# Patient Record
Sex: MALE | Race: WHITE | NOT HISPANIC OR LATINO | Employment: FULL TIME | URBAN - METROPOLITAN AREA
[De-identification: names, ages, dates, MRNs, and addresses within clinical notes are randomized per-mention and may not be internally consistent; named-entity substitution may affect disease eponyms.]

---

## 2017-01-09 ENCOUNTER — OFFICE VISIT (OUTPATIENT)
Dept: CARDIOLOGY | Facility: CLINIC | Age: 57
End: 2017-01-09
Payer: COMMERCIAL

## 2017-01-09 VITALS
OXYGEN SATURATION: 97 % | DIASTOLIC BLOOD PRESSURE: 83 MMHG | SYSTOLIC BLOOD PRESSURE: 150 MMHG | WEIGHT: 218.9 LBS | BODY MASS INDEX: 29.68 KG/M2 | HEART RATE: 60 BPM

## 2017-01-09 DIAGNOSIS — I25.10 CORONARY ARTERY DISEASE INVOLVING NATIVE CORONARY ARTERY OF NATIVE HEART WITHOUT ANGINA PECTORIS: Primary | ICD-10-CM

## 2017-01-09 PROCEDURE — 99213 OFFICE O/P EST LOW 20 MIN: CPT | Performed by: INTERNAL MEDICINE

## 2017-01-09 RX ORDER — ASPIRIN 325 MG
325 TABLET ORAL DAILY
Qty: 90 TABLET | Refills: 3
Start: 2017-01-09 | End: 2020-04-06

## 2017-01-09 ASSESSMENT — PAIN SCALES - GENERAL: PAINLEVEL: NO PAIN (0)

## 2017-01-09 NOTE — PROGRESS NOTES
"CARDIOLOGY CLINIC FOLLOW UP    HPI: Tremayne Galarza is a 56 year old male referred by [place], for ongoing evaluation of CAD.  The patient's risk factor profile is: (+) HTN, (-) diabetes, (+) hyperlipidemia, (-) tobacco use, (+) family Hx CAD [maternal Hx CAD at age 38].  He has no documented history of arrhythmia, or valvular heart disease.  The patient has a history of CAD dating back to Aug 2014 when he presented to [facility] with a \"tingling sensation in his lungs\" in the absence of chest discomfort.  He was actively working out at [employer] and noticed the abnormal breathing sensation during that strenuous activity.  He had a bicycle ECHO and his LVEF failed to augment (50% --> 45%) and he had anterior-apical hypokinesis at peak exercise.  On the ECG there was a maximum of 1.0-2.0 mm of upsloping ST depression in leads II, V4, V5, V6.   A maximum of 1.0-2.0 mm. of ST elevation was present in leads avR.  He had coronary angiography on 8/27/14 and was found to have two vessel CAD.  The report documents mild nonobstructive CAD in the LAD and LCx.  The RCA had subtotal stenosis in the proximal segment (stented with 3.5x24 mm JOSE) and diffuse narrowing in the mid-distal RCA up to 90% (stented with 2 overlapping JOSE, 3.5x12 and 2.75x20).  The patient had cardiac rehabilitation post coronary angiography and PCI.   I first evaluated him in March 2015 and reviewed his coronary angiogram from [facility].  There was a 60-70% stenosis in the LAD and I was concerned enough to bring him back to the cath lab and check an FFR.  The FFR was 0.78 and the stents in the RCA were widely patent.  He continued on medical therapy.  He has not had subsequent chest discomfort, SOB (including the tingling sensation he originally described), PND, orthopnea, pedal edema, palpitations, lightheadedness, and syncope.  He is active, working at [employer], 3 times a week.  He is not experiencing any " cardiopulmonary symptoms during exercise.  The patient has no history of cerebrovascular disease or PAD.    BPs in ambulatory setting well controlled.    The patient has not used SL NTG and has Rx for Viagra.  He knows not to use them within 24 hours of one another.    He remains on ASA and ticagrelor.  However, he has not had any bleeding symptoms.  He no longer needs to be on Brilinta.      PAST MEDICAL HISTORY:  Past Medical History   Diagnosis Date     Chronic ischemic heart disease 9/4/2014     Overview:  3 stents to RCA 8/2014      Hyperlipidemia 11/14/2005     Overview:  LW Onset:  13Tsd63      Essential hypertension 2/11/2005     Overview:  LW Onset:  83Uvs23        CURRENT MEDICATIONS:  Current Outpatient Prescriptions   Medication Sig Dispense Refill     ticagrelor (BRILINTA) 90 MG tablet Take 1 tablet (90 mg) by mouth 2 times daily 60 tablet 0     atorvastatin (LIPITOR) 80 MG tablet Take 1 tablet (80 mg) by mouth daily 30 tablet 6     atenolol (TENORMIN) 25 MG tablet Take 1 tablet (25 mg) by mouth daily 90 tablet 3     ASPIRIN PO Take 81 mg by mouth daily       Fexofenadine HCl (ALLEGRA PO) Take 30 mg by mouth daily       sildenafil (VIAGRA) 50 MG tablet as needed for ED       order for DME Equipment being ordered: Spacious - blue - size E 1 Device 0       PAST SURGICAL HISTORY:  Past Surgical History   Procedure Laterality Date     Cardiac surgery       Colonoscopy N/A 12/30/2015     Procedure: COLONOSCOPY;  Surgeon: Duane, William Charles, MD;  Location: [facility] OR     Colonoscopy with co2 insufflation N/A 12/30/2015     Procedure: COLONOSCOPY WITH CO2 INSUFFLATION;  Surgeon: Duane, William Charles, MD;  Location: [facility] OR       ALLERGIES  Review of patient's allergies indicates no known allergies.    FAMILY HX:  No family history on file.    SOCIAL HX:  Social History     Social History     Marital Status:      Spouse Name: N/A     Number of Children: N/A     Years of Education: N/A     Social History  Main Topics     Smoking status: Never Smoker      Smokeless tobacco: Never Used     Alcohol Use: Yes     Drug Use: No     Sexual Activity:     Partners: Female     Other Topics Concern     None     Social History Narrative       ROS:  Constitutional: No fever, chills, or sweats. No weight gain/loss.   HEENT: No visual disturbance, ear ache, epistaxis, sore throat.   Allergies/Immunologic: Negative.   Respiratory: No cough, hemoptysis.   Cardiovascular: As per HPI.   GI: No nausea, vomiting, hematemesis, melena, or hematochezia.   : No urinary frequency, dysuria, or hematuria.   Integument: No rash.   Psychiatric: No anxiety / depression.   Neuro: No speech disturbance, focal sensory or motor deficit.   Endocrinology: No polyuria / polyphagia.   Musculoskeletal: No myalgia.    VITAL SIGNS:  /83 mmHg  Pulse 60  Wt 99.292 kg (218 lb 14.4 oz)  SpO2 97%  Body mass index is 29.68 kg/(m^2).  Wt Readings from Last 2 Encounters:   01/09/17 99.292 kg (218 lb 14.4 oz)   12/22/15 97.523 kg (215 lb)       PHYSICAL EXAM  Tremayne Galarza is a 56 year old male.in no acute distress.  HEENT: Eyes Nonicteric.  Neck: JVP normal.  Carotids +3/3 bilaterally without bruits.  Lungs: CTA.  Cor: RRR. Normal S1 and S2.  No murmur, rub, or gallop.  PMI in Lf 5th ICS.  Abd: Soft, nontender, nondistended.  NABS.  No pulsatile mass.  Extremities: No C/C/E.  Pulses +3/3 symmetric in upper and lower extremities.  Neuro: Grossly intact.  Psych: A&O x 3.  Skin: No rash.    LABS  Recent Labs   Lab Test  03/26/15   0830  02/06/15   0735   WBC  4.9  5.2   HGB  14.4  15.4   HCT  41.8  43.0   PLT  140*  171     Recent Labs   Lab Test  03/26/15   0830  02/06/15   0735   NA  138  139   POTASSIUM  3.9  4.3   CHLORIDE  105  105   CO2  24  30   GLC  97  99   BUN  16  16   CR  0.73  0.81   ARMAND  9.1  9.0     Recent Labs   Lab Test  02/06/15   0735 09/23/14   CHOL  146  155  155  155   HDL  44  42*  42  42   LDL  80  85  85  85   TRIG  110  139   139  139   CHOLHDLRATIO  3.3  3.7  3.7  3.7      EK/5/15  SB at 49.  Normal intervals and axes.  No ST shift, TWI, or Q waves.  Isolated PVC.      STRESS ECHO: 14  CONCLUSIONS  REST:  LV chamber size, wall thickness, and segmental and global wall motion are normal. No significant valvular abnormalities are seen.  The visually estimated resting LVEF is 60 %.    STRESS:  There is lack of augmentation of LVEF during peak exercise.  Anterior-apical hypokinesis is suggested.    CONCLUSIONS:  Abnormal exercise echocardiogram with adequate heart rate and workload.  There is lack of augmentation of LVEF during peak exercise.  Anterior-apical hypokinesis is suggested.  Risk stratification by stress echocardiography is identified as intermediate to high risk.    REST ECG: Mild nonspecific ST-T segments.  Resting HR:62 bpmResting BP:122/76 mmHg  Pre-Stress Physical Exam  Current patient medications that may affect electrocardiographic  interpretation.: Atenolol, held for greater than 48 hours.    STRESS  Stress Type: Bike Ergometer  Peak HR: 146 bpm HR Response: Normal  Peak BP: 210/99 mmHg BP Response: Abnormal  Max Predicted HR: 168 bpm HR BP Product: 99187  % of Max Predicted HR: 87 Max Exercise: 9 METS  Test Duration: 14.3 min  Reason for Termination: Fatigue Exercise Effort: Good  Risk Stratification: Intermediate risk    RESULTS  Global LVEF (rest): Normal (LVEF >50%)  Global LVEF (stress): Mildly depressed (LVEF 40-50%)    ECG: A maximum of 1.0-2.0 mm of upsloping ST depression was present in leads II, V4, V5, V6.   A maximum of 1.0-2.0 mm. of ST elevation was present in leads avR.  Q waves were NOT present in leads with ST elevation.    SYMPTOMS  Chest pain was reproduced and did NOT require termination of the test. Pt complained of 3/10 chest burning with peak exercise.  Protocol completed. Predicted heart rate achieved. Hypertensive response to exercise. Shortness of breath.  Musculoskeletal.   Fatigue.    CARDIAC CATH: 8/27/14  The coronary circulation is right dominant.  Left main coronary artery   Left main: Normal.  Left anterior descending artery and branches  LAD: Normal. Moderate calcification, nonobstructive disease.  Left circumflex artery and branches  Circumflex: Moderate calcification, nonobstructive disease.  Right coronary artery and branches  RCA: This vessel is subtotally occluded in the proximal portion. There is a long segment of disease in the mid/distal portion with JOE 2 flow. Both of these lesions were successfully stented with an everolimus eluting  stents. Proximal RCA: Mid RCA:    Interventions  LESION #1 INTERVENTION: A successful drug-eluting stent was performed on the 99 % lesion in the proximal RCA. Following intervention there was an excellent angiographic appearance with a 0 % residual stenosis. There was JOE 3 flow after the  Procedure.   Balloon angioplasty was performed, using a 3.0 x 15 mm Emerge MR balloon, with 3 inflations and a maximum inflation pressure of 14 juan pablo.   A 3.5 x 24 Premier Promus Stent everolimus-eluting stent was placed across the lesion and deployed at a maximum inflation pressure of 11 juan pablo.    LESION #2 INTERVENTION A successful drug-eluting stent was performed on the 90 % lesion in the mid RCA. Following intervention there was an excellent angiographic appearance with a 0 % residual stenosis.  Balloon angioplasty was performed, using a 3.0 x 15 mm Emerge MR balloon, with 1 inflations and a maximum inflation pressure of 6 juan pablo. Balloon angioplasty was performed, using a 2.75 x 20 mm Emerge MR balloon, with 4 inflations and a maximum inflation pressure of 14 juan pablo. A 2.75 x 38 premier Promus Stent everolimus-eluting stent was placed across the lesion and deployed at a maximum inflation pressure of 18 juan pablo.  A 3.5 x 12 Premier Promus Stent everolimus-eluting stent was placed across the lesion and deployed at a maximum inflation pressure of 11  juan pablo.    Coronary Angiogram (3/26/15):  Coronary angiography was performed via the Rt RADIAL artery using a 5 Fr Shiraz catheter and a 6 Fr Ikari 3.5 GC.  The LAD and LCx had separate ostia.  Both coronary vessels had moderate calcification in the proximal segment.  The LAD had 30-40% narrowing proximally and a long 60% stenosis in the mid segment.  There was 25% narrowing at the takeoff of D3.  There were three diagonal branches, all moderate in caliber.  D1 had minimal disease.  D2 had 50% ostial narrowing.  D3 had minimal disease.  The LCx gave rise to 2 OM branches.  There was mild atherosclerosis (25%) disease in the LCx that was diffuse in nature.  The OM branches had mild disease. The RCA had a stent in the proximal segment and two overlapping stents spanning the mid-distal segment.  There was mild ISR (10%) in the stented segment.  There was 30-40% disease between the two stents.  There was minimal disease in the Rt PDA and Rt PL segments.    Physiologic assessment was performed on the LAD with the Radiwire positioned at the apex of the LAD.  The FFR of LAD was 0.78 at peak hyperemia.    Impression:    1. Single vessel CAD, angiographically borderline 60-70% long stenosis in mid LAD  2. Physiologically borderline stenosis in mid LAD    ASSESSMENT AND PLAN:    1. CAD.  Mr. Galarza has two vessel CAD and is now 29 months post PCI of the proximal, mid/distal RCA segments with JOSE.  I was concerned about the diffuse nature of the proximal / mid LAD His stress ECHO showed hypokinesis of anterior/apical segments.  He had FFR of 0.78 in the LAD.  I would like him to stay on the current Rx including ASA, Atenolol, and statin.  He may stop the Brilinta.  With normal LVEF and no prior acute coronary syndrome and HTN well controlled, I do not think the addition of an ACE-I is necessary at this time.     2. Hypercholesterolemia.    Recent Labs   Lab Test  01/10/17   0746  02/06/15   0735 09/23/14   CHOL  177  146  155   155  155   HDL  42  44  42*  42  42   LDL  94  80  85  85  85   TRIG  206*  110  139  139  139   CHOLHDLRATIO   --   3.3  3.7  3.7  3.7     LDL rising on max dose Lipitor.  Switch to Crestor 40 mg qd.    FOLLOW UP:  1 year      Tomás West MD    Divisions of Cardiology  [place], [place]    CC  Patient Care Team:  Mee Park APRN CNP as PCP - General (Family Practice)

## 2017-01-09 NOTE — PATIENT INSTRUCTIONS
The following is a summary of your office visit:    Medications started today:none    Medications stopped today: Brilinta    Medication dose change: Aspirin has been increased to 325 mg daily    Nurse contact information: Morena Perez RN  Cardiology Care Coordinator  [phone] Phone  [phone] Fax    Appointments made today: none. We will call you to schedule one year follow up    Patient instructions:none      If you have had any blood work, imaging or other testing completed we will be in touch within 1-2 weeks regarding the results. If you have any questions, concerns or need to schedule a follow up, please contact us at [phone]. If you are needing refills please contact your pharmacy. For urgent after hour care please call the [facility] at [phone] or the [facility] at [phone] and ask to speak to the cardiologist on call.    It was a pleasure meeting with you today. Please let us know if there is anything else we can do for you so that we can be sure you are leaving completely satisfied with your care experience.     Your Cardiology Team at [facility]  RN Care Coordinator: Morena  CMA: [place]

## 2017-01-09 NOTE — MR AVS SNAPSHOT
After Visit Summary   1/9/2017    Tremayne Galarza    MRN: [id number]           Patient Information     Date Of Birth          1960        Visit Information        Provider Department      1/9/2017 3:30 PM Tomás West MD [facility]        Today's Diagnoses     Coronary artery disease involving native coronary artery of native heart without angina pectoris    -  1       Care Instructions      The following is a summary of your office visit:    Medications started today:none    Medications stopped today: Brilinta    Medication dose change: Aspirin has been increased to 325 mg daily    Nurse contact information: Morena Perez RN  Cardiology Care Coordinator  [phone] Phone  [phone] Fax    Appointments made today: none. We will call you to schedule one year follow up    Patient instructions:none      If you have had any blood work, imaging or other testing completed we will be in touch within 1-2 weeks regarding the results. If you have any questions, concerns or need to schedule a follow up, please contact us at [phone]. If you are needing refills please contact your pharmacy. For urgent after hour care please call the [facility] at [phone] or the [facility] at [phone] and ask to speak to the cardiologist on call.    It was a pleasure meeting with you today. Please let us know if there is anything else we can do for you so that we can be sure you are leaving completely satisfied with your care experience.     Your Cardiology Team at [facility]  RN Care Coordinator: Morena Templeton                  Follow-ups after your visit        Your next 10 appointments already scheduled     Espinoza 10, 2017  7:30 AM   Return Visit with Radha Orona MD   [facility]    [address] [address] [place] [id number] [phone]            Espinoza 10, 2017  7:50 AM    LAB with LAB FIRST FLOOR [facility] ([facility])    [address] [address] [place] [id number] [phone]           Patient must bring picture ID.  Patient should be prepared to give a urine specimen  Please do not eat 10-12 hours before your appointment if you are coming in fasting for labs on lipids, cholesterol, or glucose (sugar).  Pregnant women should follow their Care Team instructions. Water with medications is okay. Do not drink coffee or other fluids.   If you have concerns about taking  your medications, please ask at office or if scheduling via [employer], send a message by clicking on Secure Messaging, Message Your Care Team.              Who to contact     If you have questions or need follow up information about today's clinic visit or your schedule please contact [facility] directly at [phone].  Normal or non-critical lab and imaging results will be communicated to you by [employer], letter or phone within 4 business days after the clinic has received the results. If you do not hear from us within 7 days, please contact the clinic through [facility] or phone. If you have a critical or abnormal lab result, we will notify you by phone as soon as possible.  Submit refill requests through [employer] or call your pharmacy and they will forward the refill request to us. Please allow 3 business days for your refill to be completed.          Additional Information About Your Visit        [employer] Information     [employer] gives you secure access to your electronic health record. If you see a primary care provider, you can also send messages to your care team and make appointments. If you have questions, please call your primary care clinic.  If you do not have a primary care provider, please call [phone] and they will assist you.      [employer] is an electronic gateway that provides easy, online access to your medical records. With [employer], you  can request a clinic appointment, read your test results, renew a prescription or communicate with your care team.     To access your existing account, please contact your [facility] or call [phone] for assistance.        Care EveryWhere ID     This is your Care EveryWhere ID. This could be used by other organizations to access your [facility] [place] medical records  [id number]        Your Vitals Were     Pulse Pulse Oximetry                60 97%           Blood Pressure from Last 3 Encounters:   01/09/17 150/83   08/04/16 127/81   01/15/16 130/78    Weight from Last 3 Encounters:   01/09/17 99.292 kg (218 lb 14.4 oz)   12/22/15 97.523 kg (215 lb)   10/29/15 97.705 kg (215 lb 6.4 oz)              Today, you had the following     No orders found for display         Today's Medication Changes          These changes are accurate as of: 1/9/17  4:22 PM.  If you have any questions, ask your nurse or doctor.               These medicines have changed or have updated prescriptions.        Dose/Directions    aspirin 325 MG tablet   This may have changed:    - medication strength  - how much to take   Used for:  Coronary artery disease involving native coronary artery of native heart without angina pectoris   Changed by:  Tomás West MD        Dose:  325 mg   Take 1 tablet (325 mg) by mouth daily   Quantity:  90 tablet   Refills:  3         Stop taking these medicines if you haven't already. Please contact your care team if you have questions.     ticagrelor 90 MG tablet   Commonly known as:  BRILINTA   Stopped by:  Tomás West MD                Where to get your medicines      Some of these will need a paper prescription and others can be bought over the counter.  Ask your nurse if you have questions.     You don't need a prescription for these medications    - aspirin 325 MG tablet             Primary Care Provider Office Phone # Fax #    SYLVIA Nair CNP  [phone]       [facility] [address]  [place] [address]        Thank you!     Thank you for choosing [facility]  for your care. Our goal is always to provide you with excellent care. Hearing back from our patients is one way we can continue to improve our services. Please take a few minutes to complete the written survey that you may receive in the mail after your visit with us. Thank you!             Your Updated Medication List - Protect others around you: Learn how to safely use, store and throw away your medicines at www.disposemymeds.org.          This list is accurate as of: 1/9/17  4:22 PM.  Always use your most recent med list.                   Brand Name Dispense Instructions for use    ALLEGRA PO      Take 30 mg by mouth daily       aspirin 325 MG tablet     90 tablet    Take 1 tablet (325 mg) by mouth daily       atenolol 25 MG tablet    TENORMIN    90 tablet    Take 1 tablet (25 mg) by mouth daily       atorvastatin 80 MG tablet    LIPITOR    30 tablet    Take 1 tablet (80 mg) by mouth daily       order for DME     1 Device    Equipment being ordered: superfeet - blue - size E       sildenafil 50 MG cap/tab    REVATIO/VIAGRA     as needed for ED

## 2017-01-10 ENCOUNTER — OFFICE VISIT (OUTPATIENT)
Dept: DERMATOLOGY | Facility: CLINIC | Age: 57
End: 2017-01-10
Payer: COMMERCIAL

## 2017-01-10 DIAGNOSIS — Z85.828 HISTORY OF NONMELANOMA SKIN CANCER: ICD-10-CM

## 2017-01-10 DIAGNOSIS — I25.10 CAD (CORONARY ARTERY DISEASE): ICD-10-CM

## 2017-01-10 DIAGNOSIS — L57.0 ACTINIC KERATOSIS: ICD-10-CM

## 2017-01-10 DIAGNOSIS — Z86.018 HISTORY OF DYSPLASTIC NEVUS: ICD-10-CM

## 2017-01-10 DIAGNOSIS — D22.9 MULTIPLE BENIGN NEVI: Primary | ICD-10-CM

## 2017-01-10 LAB
CHOLEST SERPL-MCNC: 177 MG/DL
HDLC SERPL-MCNC: 42 MG/DL
LDLC SERPL CALC-MCNC: 94 MG/DL
NONHDLC SERPL-MCNC: 135 MG/DL
TRIGL SERPL-MCNC: 206 MG/DL

## 2017-01-10 PROCEDURE — 99213 OFFICE O/P EST LOW 20 MIN: CPT | Mod: 25 | Performed by: DERMATOLOGY

## 2017-01-10 PROCEDURE — 80061 LIPID PANEL: CPT | Performed by: INTERNAL MEDICINE

## 2017-01-10 PROCEDURE — 36415 COLL VENOUS BLD VENIPUNCTURE: CPT | Performed by: INTERNAL MEDICINE

## 2017-01-10 PROCEDURE — 17000 DESTRUCT PREMALG LESION: CPT | Performed by: DERMATOLOGY

## 2017-01-10 NOTE — PROGRESS NOTES
[facility] Dermatology Note      Dermatology Problem List:  1. Compound nevus with severe dysplasia, left chest  -s/p excision 1/15/2016  2. Eczema, left shin  -s/p triamcinolone initiated 1/4/2016  3. Hx of NMSC  -BCC, upper chest s/p ED and C 8/2016  -BCC, base of neck s/p excision 8/4/2016    Encounter Date: Espinoza 10, 2017    CC:  Chief Complaint   Patient presents with     Derm Problem     6mo skin check         History of Present Illness:  Mr. Tremayne Galarza is a 56 year old male who presents as a follow up for dysplastic nevus and basal cell carcinoma.     Past Medical History:   Patient Active Problem List   Diagnosis     Chronic ischemic heart disease     Benign neoplasm of colon     Hyperlipidemia with target LDL less than 100     HTN, goal below 140/90     Lesion of plantar nerve     Obesity     Allergic rhinitis     Postsurgical percutaneous transluminal coronary angioplasty status     Nondependent alcohol abuse     Status post coronary angiogram     CAD (coronary artery disease)     Past Medical History   Diagnosis Date     Chronic ischemic heart disease 9/4/2014     Overview:  3 stents to RCA 8/2014      Hyperlipidemia 11/14/2005     Overview:  LW Onset:  [id number]      Essential hypertension 2/11/2005     Overview:  LW Onset:  54Qdz65      Past Surgical History   Procedure Laterality Date     Cardiac surgery       Colonoscopy N/A 12/30/2015     Procedure: COLONOSCOPY;  Surgeon: Duane, William Charles, MD;  Location: [facility] OR     Colonoscopy with co2 insufflation N/A 12/30/2015     Procedure: COLONOSCOPY WITH CO2 INSUFFLATION;  Surgeon: Duane, William Charles, MD;  Location:  OR       Social History:  The patient works as a IT Executive. The patient denies use of tanning beds. The patient has 3-6 alcoholic drinks per week, does not smoke or use tobacco.     Family History:  There is no family history of skin cancer.  There is a family history of cardiovascular disease.      Medications:  Current Outpatient Prescriptions   Medication Sig Dispense Refill     aspirin 325 MG tablet Take 1 tablet (325 mg) by mouth daily 90 tablet 3     atorvastatin (LIPITOR) 80 MG tablet Take 1 tablet (80 mg) by mouth daily 30 tablet 6     atenolol (TENORMIN) 25 MG tablet Take 1 tablet (25 mg) by mouth daily 90 tablet 3     Fexofenadine HCl (ALLEGRA PO) Take 30 mg by mouth daily       order for DME Equipment being ordered: superfeet - blue - size E 1 Device 0     sildenafil (VIAGRA) 50 MG tablet as needed for ED         No Known Allergies    Review of Systems:  -Const: Denies fevers or chills.  -Skin: As above in HPI. No additional skin concerns.    Physical exam:  Vitals: There were no vitals taken for this visit.  GEN: This is a well developed, well-nourished male in no acute distress, in a pleasant mood.    SKIN: Total skin excluding the undergarment areas was performed. The exam included the head/face, neck, both arms, chest, back, abdomen, both legs, digits and/or nails. Exam of buttocks and genitals also performed with Georgia Johnson CMA as chaperone.    -There is a well healed surgical scar without erythema, nodularity or telangiectasias on the chest and base of the neck. No re pigmentation on the left chest  - There are well circumscribed, symmetric tan to brown pigmented macules and papules on the trunk and extremities, few.   -There is 1 erythematous macules with overlying adherent scale on the left cheek  -No other lesions of concern on areas examined.       Impression/Plan:  1. History of dysplastic nevus, no clinical evidence of recurrence.   Sun precaution was advised   ABCDES  2. History of NMSC- as above, no evidence of recurrence  3. Few clinically benign nevi  4. Acinic keratosis  Cryotherapy procedure note: After verbal consent and discussion of risks and benefits including but no limited to dyspigmentation/scar and pain, 1was(were) treated with 1-2mm freeze border for 2 cycles with  liquid nitrogen. Post cryotherapy instructions were provided verbally.     Follow up in 6 months, earlier pending biopsy, earlier for new or changing lesions.     Staff Involved:  Staff    Radha Orona MD    Department of Dermatology  [facility]: Phone: [phone], Fax:[phone]  [facility]: Phone: [phone], Fax: [phone]

## 2017-01-10 NOTE — NURSING NOTE
Dermatology Rooming Note    Tremayne Galarza's goals for this visit include:   Chief Complaint   Patient presents with     Derm Problem     6mo skin check       Is a scribe okay for this visit:YES,     Are records needed for this visit(If yes, obtain release of information): No,      Vitals: There were no vitals taken for this visit.    Referring Provider:  No referring provider defined for this encounter.    Dariana Santos LPN

## 2017-01-17 ENCOUNTER — TELEPHONE (OUTPATIENT)
Dept: CARDIOLOGY | Facility: CLINIC | Age: 57
End: 2017-01-17

## 2017-01-17 DIAGNOSIS — E78.5 HYPERLIPIDEMIA WITH TARGET LDL LESS THAN 100: ICD-10-CM

## 2017-01-17 DIAGNOSIS — I25.10 CAD (CORONARY ARTERY DISEASE): Primary | ICD-10-CM

## 2017-01-17 NOTE — TELEPHONE ENCOUNTER
Date: 1/17/2017    Time of Call: 2:00 PM     Diagnosis:  hypercholesterolimia     Ordering provider: Dr West    Order:         LDL rising on max dose Lipitor.  Switch to Crestor 40 mg qd.     Stop Lipitor.     If insurance does not cover Crestor, keep on Lipitor and add Zetia 10 mg qd.     Order received by: BRIANNA Mcnally     Follow-up/additional notes: Spoke to patient. He states he has had diet indiscretions and has not been working out for several months. He would like to try harder at these things for 6 months before making any medication changes. He would like Dr West's opinion. Will route.

## 2017-01-20 NOTE — TELEPHONE ENCOUNTER
Date: 1/20/2017    Time of Call: 8:49 AM     [  ] Ordering provider: Dr West    Order: Not ideal.  Compromise 4 months before changing meds (rechecking at that time.)     Order received by: BRIANNA Mcnally     Follow-up/additional notes: Spoke with patient and informed of Dr West's response. He will plan to do lab redraw in four months.  Order in. He will schedule lab appt.

## 2017-02-20 ENCOUNTER — TELEPHONE (OUTPATIENT)
Dept: CARDIOLOGY | Facility: CLINIC | Age: 57
End: 2017-02-20

## 2017-02-20 DIAGNOSIS — M72.2 PLANTAR FASCIITIS: ICD-10-CM

## 2017-02-20 DIAGNOSIS — J30.2 SEASONAL ALLERGIC RHINITIS: Primary | ICD-10-CM

## 2017-02-21 NOTE — TELEPHONE ENCOUNTER
Hello,    Can we please get a prescription for allegra sent to our pharmacy so patient can use his HSA card to pay for it? Please send if appropriate.  Thank you,    Shyanne Mireles-Ya  [facility]   [phone]

## 2017-02-27 RX ORDER — FEXOFENADINE HCL 60 MG/1
30 TABLET, FILM COATED ORAL DAILY
Qty: 60 TABLET | Refills: 3 | Status: SHIPPED | OUTPATIENT
Start: 2017-02-27

## 2017-07-11 ENCOUNTER — OFFICE VISIT (OUTPATIENT)
Dept: DERMATOLOGY | Facility: CLINIC | Age: 57
End: 2017-07-11
Payer: COMMERCIAL

## 2017-07-11 DIAGNOSIS — Z86.018 HISTORY OF DYSPLASTIC NEVUS: Primary | ICD-10-CM

## 2017-07-11 DIAGNOSIS — Z85.828 HISTORY OF NONMELANOMA SKIN CANCER: ICD-10-CM

## 2017-07-11 PROCEDURE — 99213 OFFICE O/P EST LOW 20 MIN: CPT | Performed by: DERMATOLOGY

## 2017-07-11 NOTE — PATIENT INSTRUCTIONS
It was a pleasure to see you at your recent visit in Dermatology.    We will schedule your next follow up appointment however, your appointment may be rescheduled due to any unforseen circumstances.    If you have any questions or concerns, please feel free to contact us at [facility] at [phone].        If you have an urgent skin problem while Dr. Orona is on maternity leave you may be seen by Dr. Holder at [facility] or at the [facility].     They are able to see your current dermatology medical record.      Hours and appointment phone numbers may vary by service.      Dr. Holder  [facility]  [address] [place] [address]  [phone]    Directions   [facility] is located at [address] between [address] in [facility], four miles north of [place]. Access to the medical center is from [address] via [address].    Location and directions  From [place] [address]  Take [address] at [place] to [facility] [place] [address]. Go east on [address] [place] [address]. ([address]) one-quarter mile to the stoplight at the intersection with [address] . Turn right. Follow [address] to the stoplight at [facility]. Turn right onto [facility] [address]. The medical center is straight ahead.  From the east  Take [address] west to the stoplight at its intersection with [address] one mile west of [place]. Turn right onto [address]/ and then immediately turn left onto [address]. Follow 22 to [place]. When 22 intersects with [address], turn left onto [address]. Go south one mile to the stoplight at [facility]. Turn right onto [facility] [facility]. to go straight to the medical center.  From the west  Take [address] ([address] east until it crosses over [address] in [place]. At the stoplight, turn right onto [address]. Following [address] one mile to the  "stoplight at [facility] Turn right to get to the medical center.  From the north or south via [address]  Take [address] to [place]. The medical center is located on the south side of [place].  At the stoplight at the intersection of [address] and [facility] turn west onto [facility]. and follow it to the medical center entrance.          [facility] [address] [address]  [phone]    Directions and Parking   Directions  1. [address] to exit [address], [address], and merge into the left turn [address].  2. Turn left on [facility]. The Clinics and Surgery Center will be on your right. Turn into the driveway for  parking service.  Parking   parking:   We encourage patients and visitors to use convenient  parking service at the [facility]. Enter the main arrival plaza from [facility].  cost is a $6 flat rate fee, regardless of your length of visit. Tips are not expected.   Self-parking:   Enter the main arrival plaza at the [facility] from [facility]. From there, parking attendants will direct you to the best self-parking option. Bring your parking tickets inside with you and pay for parking before leaving the Center to get the reduced parking rate.   Parking at [place] [employer] [address]:   Self-parkers who choose to park in the [facility] should enter the ramp on [address] (one block down from the [facility] main entrance). Use the center marcos designated \"[facility]\" and park on the ground level of [facility]. Please bring your ticket inside with you to pay for parking to receive the [facility] reduced parking rate.  Notice: Monday-Friday, between 7-9 a.m., parking in metered spots on [facility] near the Center is not permitted. Your car will be at risk of being towed during this time.      "

## 2017-07-11 NOTE — NURSING NOTE
Dermatology Rooming Note    Tremayne Galarza's goals for this visit include:   Chief Complaint   Patient presents with     RECHECK     6 month skin cehck - Hx of Sever DN - no lesions of concern       Is a scribe okay for this visit: YES  Are records needed for this visit(If yes, obtain release of information): NO     Vitals: There were no vitals taken for this visit.    Referring Provider:  No referring provider defined for this encounter.    Georgia Johnson, CMA

## 2017-07-11 NOTE — PROGRESS NOTES
[facility] Dermatology Note      Dermatology Problem List:  1. NMSC  -BCC, upper chest, s/p ED&C 8/4/2016  -BCC, base of neck, s/p excision 8/4/2016  2. Compound nevus with severe dysplasia, left chest  -s/p excision 1/15/2016  3. Eczema, left shin  -Previous Tx:  triamcinolone (initiated 1/4/2016)    Encounter Date: Jul 11, 2017    CC:  Chief Complaint   Patient presents with     RECHECK     6 month skin cehck - Hx of Sever DN - no lesions of concern         History of Present Illness:  Mr. Tremayne Galarza is a 57 year old male who presents as a follow up for NMSC and dysplastic nevus. The patient was last seen 1/10/2017 when 1 AK was treated with cryotherapy. Today, the patient denies any lesions bleeding, crusting or changing. The patient reports no other lesions of concern.    Past Medical History:   Patient Active Problem List   Diagnosis     Chronic ischemic heart disease     Benign neoplasm of colon     Hyperlipidemia with target LDL less than 100     HTN, goal below 140/90     Lesion of plantar nerve     Obesity     Allergic rhinitis     Postsurgical percutaneous transluminal coronary angioplasty status     Nondependent alcohol abuse     Status post coronary angiogram     CAD (coronary artery disease)     Past Medical History:   Diagnosis Date     Chronic ischemic heart disease 9/4/2014    Overview:  3 stents to RCA 8/2014      Essential hypertension 2/11/2005    Overview:  LW Onset:  11Biq60      Hyperlipidemia 11/14/2005    Overview:  LW Onset:  14Nov05      Past Surgical History:   Procedure Laterality Date     CARDIAC SURGERY       COLONOSCOPY N/A 12/30/2015    Procedure: COLONOSCOPY;  Surgeon: Duane, William Charles, MD;  Location: [facility] OR     COLONOSCOPY WITH CO2 INSUFFLATION N/A 12/30/2015    Procedure: COLONOSCOPY WITH CO2 INSUFFLATION;  Surgeon: Duane, William Charles, MD;  Location:  OR     Social History:  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 The patient denies use of tanning  beds. The patient has 3-6 alcoholic drinks per week, does not smoke or use tobacco. The patient has 3 grandchildren.     Family History:  There is no family history of skin cancer.  There is a family history of cardiovascular disease.     Medications:  Current Outpatient Prescriptions   Medication Sig Dispense Refill     fexofenadine (ALLEGRA) 60 MG tablet Take 0.5 tablets (30 mg) by mouth daily 60 tablet 3     aspirin 325 MG tablet Take 1 tablet (325 mg) by mouth daily 90 tablet 3     atorvastatin (LIPITOR) 80 MG tablet Take 1 tablet (80 mg) by mouth daily 30 tablet 6     atenolol (TENORMIN) 25 MG tablet Take 1 tablet (25 mg) by mouth daily 90 tablet 3     sildenafil (VIAGRA) 50 MG tablet as needed for ED       No Known Allergies    Review of Systems:  -Const: Denies recent illness. Is otherwise feeling well.   -Skin: As above in HPI. No additional skin concerns.    Physical exam:  Vitals: There were no vitals taken for this visit.  GEN: This is a well developed, well-nourished male in no acute distress, in a pleasant mood.    SKIN: Total skin excluding the undergarment areas was performed. The exam included the head/face, neck, both arms, chest, back, abdomen, both legs, digits and/or nails. Declines genital exam.   -There are well healed surgical scars without erythema, nodularity or telangiectasias on the left chest, upper chest and base of neck.   -There are scattered light brown macules on sun exposed areas.   -No other lesions of concern on areas examined.     Impression/Plan:  1. History of nonmelanoma skin cancer, no clincial evidence of recurrence:    Sun precaution was advised including the use of sun screens of SPF 50 or higher, sun protective clothing, and avoidance of tanning beds.  2. History of dysplastic nevus, no clinical evidence of recurrence    ABCD's of melanoma were reviewed with patient and handout provided.     3. Lentigenes    Follow up in 1 year, earlier for new or changing lesions.       Staff Involved  Staff/Scribe    Scribe Disclosure:   I, Justa Bird, am serving as a scribe to document services personally performed by Dr. Radha Orona, based on data collection and the provider's statements to me.         Provider Disclosure:   The documentation recorded by the scribe accurately reflects the services I personally performed and the decisions made by me.    Radha Orona MD    Department of Dermatology  [facility]: Phone: [phone], Fax:[phone]  [facility]: Phone: [phone], Fax: [phone]

## 2017-07-11 NOTE — MR AVS SNAPSHOT
After Visit Summary   7/11/2017    Tremayne Galarza    MRN: [id number]           Patient Information     Date Of Birth          1960        Visit Information        Provider Department      7/11/2017 7:45 AM Radha Orona MD [facility]        Today's Diagnoses     History of dysplastic nevus    -  1    History of nonmelanoma skin cancer          Care Instructions    It was a pleasure to see you at your recent visit in Dermatology.    We will schedule your next follow up appointment however, your appointment may be rescheduled due to any unforseen circumstances.    If you have any questions or concerns, please feel free to contact us at [facility] at [phone].        If you have an urgent skin problem while Dr. Orona is on maternity leave you may be seen by Dr. Holder at [facility] or at the [facility].     They are able to see your current dermatology medical record.      Hours and appointment phone numbers may vary by service.      Dr. Holder  [id number] [place] [address]  [phone]    Directions   [facility] is located at [address] between [place] [address] and [place] [address] in [facility], four miles north of [place]. Access to the medical center is from [place] [address] via [address].    Location and directions  From [place] [address]  Take [address] at [place] to [facility]. Go east on [address]. ([address]) one-quarter mile to the stoplight at the intersection with [place] [address]. Turn right. Follow [place] [address] to the stoplight at [address]. Turn right onto [address]. The medical center is straight ahead.  From the east  Take [address] west to the stoplight at its intersection with [place] [address] one mile west of [place]. Turn right onto [address] and then immediately turn left onto [place] [address].  "Follow 22 to [place]. When 22 intersects with [address], turn left onto [address]. Go south one mile to the stoplight at [facility] [address] Turn right onto [address] to go straight to the medical center.  From the west  Take [address] ([address] east until it crosses over [address] in [place]. At the stoplight, turn right onto [address]. Following [address] one mile to the stoplight at [address] Turn right to get to the medical center.  From the north or south via [address]  Take [address] to [place]. The medical center is located on the south side of [place].  At the stoplight at the intersection of [address] and [address] turn west onto [address] and follow it to the medical center entrance.          [facility] [address] [address]  [phone]    Directions and Parking   Directions  1. [address] to exit [facility] [place] [place], and merge into the left turn marcos.  2. Turn left on [facility]. The Clinics and Surgery Center will be on your right. Turn into the driveway for  parking service.  Parking   parking:   We encourage patients and visitors to use convenient  parking service at the [facility]. Enter the main arrival plaza from [facility].  cost is a $6 flat rate fee, regardless of your length of visit. Tips are not expected.   Self-parking:   Enter the main arrival plaza at the [facility] from [facility]. From there, parking attendants will direct you to the best self-parking option. Bring your parking tickets inside with you and pay for parking before leaving the Center to get the reduced parking rate.   Parking at [facility]:   Self-parkers who choose to park in the [address] should enter the ramp on [facility] (one block down from the [facility] main entrance). Use the center marcos designated \"[facility]\" " and park on the ground level of [facility]. Please bring your ticket inside with you to pay for parking to receive the [facility] reduced parking rate.  Notice: Monday-Friday, between 7-9 a.m., parking in metered spots on [address] near the Center is not permitted. Your car will be at risk of being towed during this time.              Follow-ups after your visit        Who to contact     If you have questions or need follow up information about today's clinic visit or your schedule please contact [facility] directly at [phone].  Normal or non-critical lab and imaging results will be communicated to you by Watchful Softwarehart, letter or phone within 4 business days after the clinic has received the results. If you do not hear from us within 7 days, please contact the clinic through [employer] or phone. If you have a critical or abnormal lab result, we will notify you by phone as soon as possible.  Submit refill requests through Trust Metrics or call your pharmacy and they will forward the refill request to us. Please allow 3 business days for your refill to be completed.          Additional Information About Your Visit        MyChart Information     Trust Metrics gives you secure access to your electronic health record. If you see a primary care provider, you can also send messages to your care team and make appointments. If you have questions, please call your primary care clinic.  If you do not have a primary care provider, please call [phone] and they will assist you.      Trust Metrics is an electronic gateway that provides easy, online access to your medical records. With [employer], you can request a clinic appointment, read your test results, renew a prescription or communicate with your care team.     To access your existing account, please contact your [facility] or call [phone] for assistance.        Care EveryWhere ID     This is your Care EveryWhere ID. This could be  used by other organizations to access your [place] medical records  [id number]         Blood Pressure from Last 3 Encounters:   01/09/17 150/83   08/04/16 127/81   01/15/16 130/78    Weight from Last 3 Encounters:   01/09/17 218 lb 14.4 oz (99.3 kg)   12/22/15 215 lb (97.5 kg)   10/29/15 215 lb 6.4 oz (97.7 kg)              Today, you had the following     No orders found for display       Primary Care Provider Office Phone # Fax #    Mee Park, APRN [phone]       [facility] [address]  [place] [address]        Equal Access to Services     CAROL DAVIS : Hadii aad ku hadasho Soomaali, waaxda luqadaha, qaybta kaalmada adeegyada, waxay idiin hayaan stanislaw munoz . So [facility] [phone].    ATENCIÓN: Si habla español, tiene a nj disposición servicios gratuitos de asistencia lingüística. Llame al [phone].    We comply with applicable federal civil rights laws and [place] laws. We do not discriminate on the basis of race, color, national origin, age, disability sex, sexual orientation or gender identity.            Thank you!     Thank you for choosing [facility]  for your care. Our goal is always to provide you with excellent care. Hearing back from our patients is one way we can continue to improve our services. Please take a few minutes to complete the written survey that you may receive in the mail after your visit with us. Thank you!             Your Updated Medication List - Protect others around you: Learn how to safely use, store and throw away your medicines at www.disposemymeds.org.          This list is accurate as of: 7/11/17  8:25 AM.  Always use your most recent med list.                   Brand Name Dispense Instructions for use Diagnosis    aspirin 325 MG tablet     90 tablet    Take 1 tablet (325 mg) by mouth daily    Coronary artery disease involving native coronary artery of native heart without angina  pectoris       atenolol 25 MG tablet    TENORMIN    90 tablet    Take 1 tablet (25 mg) by mouth daily    Coronary artery disease involving native coronary artery of native heart without angina pectoris       atorvastatin 80 MG tablet    LIPITOR    30 tablet    Take 1 tablet (80 mg) by mouth daily    Chronic ischemic heart disease, Postsurgical percutaneous transluminal coronary angioplasty status, Hyperlipidemia       fexofenadine 60 MG tablet    ALLEGRA    60 tablet    Take 0.5 tablets (30 mg) by mouth daily    Seasonal allergic rhinitis       sildenafil 50 MG cap/tab    REVATIO/VIAGRA     as needed for ED

## 2017-07-17 DIAGNOSIS — E78.5 HYPERLIPIDEMIA: ICD-10-CM

## 2017-07-17 DIAGNOSIS — I25.9 CHRONIC ISCHEMIC HEART DISEASE: ICD-10-CM

## 2017-07-17 DIAGNOSIS — E78.00 HYPERCHOLESTEREMIA: Primary | ICD-10-CM

## 2017-07-17 DIAGNOSIS — Z98.61 POSTSURGICAL PERCUTANEOUS TRANSLUMINAL CORONARY ANGIOPLASTY STATUS: ICD-10-CM

## 2017-07-17 RX ORDER — ATORVASTATIN CALCIUM 80 MG/1
80 TABLET, FILM COATED ORAL DAILY
Qty: 30 TABLET | Refills: 6 | Status: SHIPPED | OUTPATIENT
Start: 2017-07-17 | End: 2018-03-13

## 2017-07-17 NOTE — TELEPHONE ENCOUNTER
Hello,      Patient last filled atorvastatin 80mg tablet on 6/23/17 for #30    Thank you,    [facility]   [phone]

## 2017-08-17 DIAGNOSIS — I25.10 CORONARY ARTERY DISEASE INVOLVING NATIVE CORONARY ARTERY OF NATIVE HEART WITHOUT ANGINA PECTORIS: ICD-10-CM

## 2017-08-17 NOTE — TELEPHONE ENCOUNTER
atenolol (TENORMIN) 25 MG tablet    [facility] [place] - [place] - [address], [address]    Last visit 1/9/117  Next visit

## 2017-08-18 RX ORDER — ATENOLOL 25 MG/1
25 TABLET ORAL DAILY
Qty: 90 TABLET | Refills: 2 | Status: SHIPPED | OUTPATIENT
Start: 2017-08-18 | End: 2018-05-07

## 2017-08-24 ENCOUNTER — ALLIED HEALTH/NURSE VISIT (OUTPATIENT)
Dept: PEDIATRICS | Facility: CLINIC | Age: 57
End: 2017-08-24
Payer: COMMERCIAL

## 2017-08-24 VITALS — DIASTOLIC BLOOD PRESSURE: 70 MMHG | SYSTOLIC BLOOD PRESSURE: 123 MMHG | HEART RATE: 53 BPM

## 2017-08-24 DIAGNOSIS — I10 HTN, GOAL BELOW 140/90: Primary | ICD-10-CM

## 2017-08-24 PROCEDURE — 99207 ZZC NO CHARGE NURSE ONLY: CPT | Performed by: NURSE PRACTITIONER

## 2017-08-24 NOTE — MR AVS SNAPSHOT
After Visit Summary   8/24/2017    Tremayne Galarza    MRN: [id number]           Patient Information     Date Of Birth          1960        Visit Information        Provider Department      8/24/2017 6:10 PM Mee Park APRN CNP [facility]        Today's Diagnoses     HTN, goal below 140/90    -  1       Follow-ups after your visit        Follow-up notes from your care team     Return in about 6 months (around 2/24/2018).      Your next 10 appointments already scheduled     Jul 10, 2018  7:45 AM CDT   Return Visit with Radha Orona MD   [facility] ([facility])    [address] [place] [address] [phone]              Who to contact     If you have questions or need follow up information about today's clinic visit or your schedule please contact [facility] directly at [phone].  Normal or non-critical lab and imaging results will be communicated to you by Brainjuicerhart, letter or phone within 4 business days after the clinic has received the results. If you do not hear from us within 7 days, please contact the clinic through [employer] or phone. If you have a critical or abnormal lab result, we will notify you by phone as soon as possible.  Submit refill requests through 360T or call your pharmacy and they will forward the refill request to us. Please allow 3 business days for your refill to be completed.          Additional Information About Your Visit        Brainjuicerhart Information     360T gives you secure access to your electronic health record. If you see a primary care provider, you can also send messages to your care team and make appointments. If you have questions, please call your primary care clinic.  If you do not have a primary care provider, please call [phone] and they will assist you.      360T is an electronic gateway that provides easy, online access to your medical records.  With [employer], you can request a clinic appointment, read your test results, renew a prescription or communicate with your care team.     To access your existing account, please contact your [facility] or call [phone] for assistance.        Care EveryWhere ID     This is your Care EveryWhere ID. This could be used by other organizations to access your [facility] medical records  [id number]        Your Vitals Were     Pulse                   53            Blood Pressure from Last 3 Encounters:   08/24/17 123/70   01/09/17 150/83   08/04/16 127/81    Weight from Last 3 Encounters:   01/09/17 218 lb 14.4 oz (99.3 kg)   12/22/15 215 lb (97.5 kg)   10/29/15 215 lb 6.4 oz (97.7 kg)              Today, you had the following     No orders found for display       Primary Care Provider Office Phone # Fax #    Mee Adan Xavier, APRN [facility] [phone]       [address]  [place] [address]        Equal Access to Services     PILAR DAVIS : Hadii aad ku hadasho Soomaali, waaxda luqadaha, qaybta kaalmada adeegyada, waxay idiin hayaan stanislaw munoz . So [facility] [phone].    ATENCIÓN: Si habla español, tiene a nj disposición servicios gratuitos de asistencia lingüística. Llame al [phone].    We comply with applicable federal civil rights laws and [place] laws. We do not discriminate on the basis of race, color, national origin, age, disability sex, sexual orientation or gender identity.            Thank you!     Thank you for choosing [facility]  for your care. Our goal is always to provide you with excellent care. Hearing back from our patients is one way we can continue to improve our services. Please take a few minutes to complete the written survey that you may receive in the mail after your visit with us. Thank you!             Your Updated Medication List - Protect others around you: Learn how to safely use, store and throw away  your medicines at www.disposemymeds.org.          This list is accurate as of: 8/24/17  6:15 PM.  Always use your most recent med list.                   Brand Name Dispense Instructions for use Diagnosis    aspirin 325 MG tablet     90 tablet    Take 1 tablet (325 mg) by mouth daily    Coronary artery disease involving native coronary artery of native heart without angina pectoris       atenolol 25 MG tablet    TENORMIN    90 tablet    Take 1 tablet (25 mg) by mouth daily    Coronary artery disease involving native coronary artery of native heart without angina pectoris       atorvastatin 80 MG tablet    LIPITOR    30 tablet    Take 1 tablet (80 mg) by mouth daily    Hypercholesteremia       fexofenadine 60 MG tablet    ALLEGRA    60 tablet    Take 0.5 tablets (30 mg) by mouth daily    Seasonal allergic rhinitis       sildenafil 50 MG cap/tab    REVATIO/VIAGRA     as needed for ED

## 2017-08-24 NOTE — PROGRESS NOTES
Tremayne Galarza is enrolled/participating in the retail pharmacy Blood Pressure Goals Achievement Program (BPGAP).  Tremayne Galarza was evaluated at [facility] on August 24, 2017 at which time his blood pressure was:    BP Readings from Last 3 Encounters:   08/24/17 123/70   01/09/17 150/83   08/04/16 127/81     Reviewed lifestyle modifications for blood pressure control and reduction: including making healthy food choices, managing weight, getting regular exercise, smoking cessation, reducing alcohol consumption, monitoring blood pressure regularly.     Tremayne Galarza is not experiencing symptoms    Follow-Up: BP is at goal of < 140/90mmHg (patient 18+ years of age with or without diabetes).  Recommended follow-up at pharmacy in 6 months.     Recommendation to Provider: none    Tremayne Galarza was evaluated for enrollment into the PGEN study today.    Patient eligible for enrollment:  Unknown  Patient interested in enrollment:  Unknown    Completed by: [facility]. D.  [facility] Pharmacy Manager  [phone]  8/24/2017

## 2018-03-13 DIAGNOSIS — E78.00 HYPERCHOLESTEREMIA: ICD-10-CM

## 2018-03-15 RX ORDER — ATORVASTATIN CALCIUM 80 MG/1
TABLET, FILM COATED ORAL
Qty: 30 TABLET | Refills: 6 | Status: SHIPPED | OUTPATIENT
Start: 2018-03-15 | End: 2018-04-30 | Stop reason: ALTCHOICE

## 2018-03-15 NOTE — TELEPHONE ENCOUNTER
Routing refill request to provider for review/approval because:  Labs not current:  Lipids  Patient needs to be seen because it has been more than 1 year since last office visit.    atorvastatin (LIPITOR) 80 MG tablet 30 tablet 6 7/17/2017  No   Sig: Take 1 tablet (80 mg) by mouth daily     Last OV with Dr. West: 1/9/2017    Future OV: none    LDL Cholesterol Calculated   Date Value Ref Range Status   01/10/2017 94 <100 mg/dL Final     Comment:     Desirable:       <100 mg/dl   ]  Creatinine   Date Value Ref Range Status   03/26/2015 0.73 0.66 - 1.25 mg/dL Final   ]  Statins Protocol Failed3/13 9:38 AM   LDL on file in past 12 months    Recent (12 mo) or future (30 days) visit within the authorizing provider's specialty    No abnormal creatine kinase in past 12 months    Patient is age 18 or older     Deanne Banda RN

## 2018-03-21 ENCOUNTER — OFFICE VISIT (OUTPATIENT)
Dept: PODIATRY | Facility: CLINIC | Age: 58
End: 2018-03-21
Payer: COMMERCIAL

## 2018-03-21 VITALS
DIASTOLIC BLOOD PRESSURE: 68 MMHG | HEIGHT: 71 IN | HEART RATE: 59 BPM | WEIGHT: 200 LBS | OXYGEN SATURATION: 94 % | BODY MASS INDEX: 28 KG/M2 | SYSTOLIC BLOOD PRESSURE: 124 MMHG

## 2018-03-21 DIAGNOSIS — B07.0 VERRUCA PEDIS: Primary | ICD-10-CM

## 2018-03-21 PROCEDURE — 17110 DESTRUCTION B9 LES UP TO 14: CPT | Performed by: PODIATRIST

## 2018-03-21 RX ORDER — UREA 10 %
1 LOTION (ML) TOPICAL 2 TIMES DAILY
Qty: 60 TABLET | Refills: 0 | Status: SHIPPED | OUTPATIENT
Start: 2018-03-21 | End: 2019-12-26

## 2018-03-21 NOTE — NURSING NOTE
Tremayne Galarza's goals for this visit include: warts on feet  He requests these members of his care team be copied on today's visit information: Yes    PCP: Mee Park    Referring Provider:  No referring provider defined for this encounter.    Chief Complaint   Patient presents with     Consult     warts on feet       Initial /68 (BP Location: Left arm)  Pulse 59  SpO2 94% Estimated body mass index is 29.69 kg/(m^2) as calculated from the following:    Height as of 12/22/15: 1.829 m (6').    Weight as of 1/9/17: 99.3 kg (218 lb 14.4 oz).  Medication Reconciliation: complete   Beatriz Vasquez, CMA

## 2018-03-21 NOTE — MR AVS SNAPSHOT
After Visit Summary   3/21/2018    Tremayne Galarza    MRN: [id number]           Patient Information     Date Of Birth          1960        Visit Information        Provider Department      3/21/2018 7:20 AM Zain Samuels DPM [facility]        Today's Diagnoses     Verruca pedis    -  1      Care Instructions    Thanks for coming today.  Ortho/Sports Medicine Clinic  [address] [place] [address]    To schedule future appointments in Ortho Clinic, you may call [phone].    To schedule ordered imaging by your provider:   Call Central Imaging [phone]    To schedule an injection ordered by your provider:  Call Central Imaging Injection scheduling line: [phone]  Huniehart available online at:  [email or web address]    Please call if any further questions or concerns ([phone]).  Clinic hours 8 am to 5 pm.    Return to clinic (call) if symptoms worsen or fail to improve.            Follow-ups after your visit        Your next 10 appointments already scheduled     2018  8:40 AM CDT   Return Visit with Zain Samuels DPM   [facility] ([facility])    [address] [place] [address] [phone]            2018  4:10 PM CDT   Return Visit with Tomás West MD   [facility])    [address] [place] [address] [phone]            Jul 10, 2018  8:00 AM CDT   Return Visit with Radha Orona MD   [facility])    [id number] [place] [address] [phone]              Who to contact     If you have questions or need follow up information about today's clinic visit or your schedule please contact [facility] directly at [phone].  Normal or non-critical lab and imaging results will be communicated to you by  "MyChart, letter or phone within 4 business days after the clinic has received the results. If you do not hear from us within 7 days, please contact the clinic through Infin[employer]hart or phone. If you have a critical or abnormal lab result, we will notify you by phone as soon as possible.  Submit refill requests through giftee or call your pharmacy and they will forward the refill request to us. Please allow 3 business days for your refill to be completed.          Additional Information About Your Visit        In[employer]ineVision Information     giftee gives you secure access to your electronic health record. If you see a primary care provider, you can also send messages to your care team and make appointments. If you have questions, please call your primary care clinic.  If you do not have a primary care provider, please call [phone] and they will assist you.      giftee is an electronic gateway that provides easy, online access to your medical records. With giftee, you can request a clinic appointment, read your test results, renew a prescription or communicate with your care team.     To access your existing account, please contact your [facility] or call [phone] for assistance.        Care EveryWhere ID     This is your Care EveryWhere ID. This could be used by other organizations to access your [facility] medical records  [id number]        Your Vitals Were     Pulse Height Pulse Oximetry BMI (Body Mass Index)          59 1.797 m (5' 10.75\") 94% 28.09 kg/m2         Blood Pressure from Last 3 Encounters:   03/21/18 124/68   08/24/17 123/70   01/09/17 150/83    Weight from Last 3 Encounters:   03/21/18 90.7 kg (200 lb)   01/09/17 99.3 kg (218 lb 14.4 oz)   12/22/15 97.5 kg (215 lb)              We Performed the Following     DESTRUCT BENIGN LESION, UP TO 14          Today's Medication Changes          These changes are accurate as of 3/21/18  8:02 AM.  If you have any questions, ask your " nurse or doctor.               Start taking these medicines.        Dose/Directions    Zinc Sulfate 220 (50 ZN) MG Tabs   Used for:  Verruca pedis        Dose:  1 tablet   Take 1 tablet by mouth 2 times daily   Quantity:  60 tablet   Refills:  0            Where to get your medicines      These medications were sent to [facility] [place] - [place], [place] - [address]  [address], [facility] [address]     Phone:  [phone]     Zinc Sulfate 220 (50 ZN) MG Tabs                Primary Care Provider Office Phone # Fax #    Mee Park, APRN [phone]       [address]  [place] [address]        Equal Access to Services     CAROL DAVIS : Corey hirscho Sorellali, waaxda luqadaha, qaybta kaalmada adeegyada, isha munoz . So [facility] [phone].    ATENCIÓN: Si habla español, tiene a nj disposición servicios gratuitos de asistencia lingüística. [facility] [phone].    We comply with applicable federal civil rights laws and [place] laws. We do not discriminate on the basis of race, color, national origin, age, disability, sex, sexual orientation, or gender identity.            Thank you!     Thank you for choosing [facility]  for your care. Our goal is always to provide you with excellent care. Hearing back from our patients is one way we can continue to improve our services. Please take a few minutes to complete the written survey that you may receive in the mail after your visit with us. Thank you!             Your Updated Medication List - Protect others around you: Learn how to safely use, store and throw away your medicines at www.disposemymeds.org.          This list is accurate as of 3/21/18  8:02 AM.  Always use your most recent med list.                   Brand Name Dispense Instructions for use Diagnosis    aspirin 325 MG tablet     90 tablet    Take 1 tablet (325  mg) by mouth daily    Coronary artery disease involving native coronary artery of native heart without angina pectoris       atenolol 25 MG tablet    TENORMIN    90 tablet    Take 1 tablet (25 mg) by mouth daily    Coronary artery disease involving native coronary artery of native heart without angina pectoris       atorvastatin 80 MG tablet    LIPITOR    30 tablet    TAKE ONE TABLET BY MOUTH EVERY DAY    Hypercholesteremia       fexofenadine 60 MG tablet    ALLEGRA    60 tablet    Take 0.5 tablets (30 mg) by mouth daily    Seasonal allergic rhinitis       sildenafil 50 MG tablet    VIAGRA     as needed for ED        Zinc Sulfate 220 (50 ZN) MG Tabs     60 tablet    Take 1 tablet by mouth 2 times daily    Verruca pedis

## 2018-03-21 NOTE — LETTER
3/21/2018         RE: Tremayne Galarza  [address] [facility] [address]        Dear Colleague,    Thank you for referring your patient, Tremayne Galarza, to the [facility]. Please see a copy of my visit note below.    Date of Service: 3/21/2018    Chief Complaint:   Chief Complaint   Patient presents with     Consult     warts on feet        HPI: Tremayne is a 58 year old male who presents today for further evaluation of warts on both of the bottom of his feet.  He relates that these have been present for the past few months.  He relates that they are more numerous on the right foot, and he thinks he only has one on the left.  He has tried over-the-counter wart remover's for these without success.  He relates that they are not exquisitely painful for him, but they are somewhat irritating when he is working out.  He relates that he thinks he got these from the gym.  He has not had any of these before  this episode.    Review of Systems: No n/v/d/f/c/ns/sob/cp    PMH:   Past Medical History:   Diagnosis Date     Chronic ischemic heart disease 9/4/2014    Overview:  3 stents to RCA 8/2014      Essential hypertension 2/11/2005    Overview:  LW Onset:  [id number]      Hyperlipidemia 11/14/2005    Overview:  LW Onset:  14Nov05        PSxH:   Past Surgical History:   Procedure Laterality Date     CARDIAC SURGERY       COLONOSCOPY N/A 12/30/2015    Procedure: COLONOSCOPY;  Surgeon: Duane, William Charles, MD;  Location: [facility] OR     COLONOSCOPY WITH CO2 INSUFFLATION N/A 12/30/2015    Procedure: COLONOSCOPY WITH CO2 INSUFFLATION;  Surgeon: Duane, William Charles, MD;  Location: [facility] OR       Allergies: Review of patient's allergies indicates no known allergies.    SH:   Social History     Social History     Marital status:      Spouse name: N/A     Number of children: N/A     Years of education: N/A     Occupational History     Not on file.     Social History Main Topics     Smoking status: Never Smoker  "    Smokeless tobacco: Never Used     Alcohol use Yes     Drug use: No     Sexual activity: Yes     Partners: Female     Other Topics Concern     Not on file     Social History Narrative       FH: No family history on file.    Objective:  Data Unavailable 59 Data Unavailable 124/68 5' 10.75\" 200 lbs 0 oz    PT and DP pulses are 2/4 bilaterally. CRT is 3 seconds. Positive pedal hair.   Gross sensation is intact bilaterally.   Equinus is not noted bilaterally. No pain with active or passive ROM of the ankle, MTJ, 1st ray, or halluces bilaterally,.   Nails normal bilaterally. No open lesions are noted.  There are hyperkeratotic lesions with 5 on the right foot and one on the left.  The lesions on the right foot are mostly concentrated around the balls of the foot.  These were sharply debrided with a 15 blade, and exhibited loss of skin tension lines, pain with lateral compression, and pinpoint bleeding, consistent with verruca plantaris.  There is also a lesion with the same characteristics on the plantar left foot in the midfoot.      Assessment: Bilateral verruca plantaris - failed over-the-counter treatments.     Plan:  - Pt seen and evaluated.  -I discussed the treatment options with him today.  I related that verruca can sometimes be difficult to care.  He would like to try to have these destroyed with liquid nitrogen today.  I discussed the risks, complications, benefits, alternatives, and answered all his questions.  After debridement to pinpoint bleeding,  I applied liquid nitrogen in 3 applications of 5 seconds each to the right lesions.  Because of the blistering effect, we will leave the left foot alone today.  I related to him to leave all of the crusty blistered skin alone.  He should keep my dressing intact until tomorrow.  Then, he can shower normally but he is to leave the blister intact.  On Saturday he can start to use over-the-counter salicylic acid on all of the areas.  I will also start him on a " course of zinc sulfate 50 elemental units twice daily.  This was sent to the pharmacy.    -See again in 3 weeks.  If no response after 3 or 4 visits with me, will send to dermatology.             Again, thank you for allowing me to participate in the care of your patient.        Sincerely,        Zain Samuels DPM

## 2018-03-21 NOTE — PROGRESS NOTES
Date of Service: 3/21/2018    Chief Complaint:   Chief Complaint   Patient presents with     Consult     warts on feet        HPI: Tremayne is a 58 year old male who presents today for further evaluation of warts on both of the bottom of his feet.  He relates that these have been present for the past few months.  He relates that they are more numerous on the right foot, and he thinks he only has one on the left.  He has tried over-the-counter wart remover's for these without success.  He relates that they are not exquisitely painful for him, but they are somewhat irritating when he is working out.  He relates that he thinks he got these from the gym.  He has not had any of these before  this episode.    Review of Systems: No n/v/d/f/c/ns/sob/cp    PMH:   Past Medical History:   Diagnosis Date     Chronic ischemic heart disease 9/4/2014    Overview:  3 stents to RCA 8/2014      Essential hypertension 2/11/2005    Overview:  LW Onset:  [id number]      Hyperlipidemia 11/14/2005    Overview:  LW Onset:  14Nov05        PSxH:   Past Surgical History:   Procedure Laterality Date     CARDIAC SURGERY       COLONOSCOPY N/A 12/30/2015    Procedure: COLONOSCOPY;  Surgeon: Duane, William Charles, MD;  Location: [facility]     COLONOSCOPY WITH CO2 INSUFFLATION N/A 12/30/2015    Procedure: COLONOSCOPY WITH CO2 INSUFFLATION;  Surgeon: Duane, William Charles, MD;  Location: [facility]       Allergies: Review of patient's allergies indicates no known allergies.    SH:   Social History     Social History     Marital status:      Spouse name: N/A     Number of children: N/A     Years of education: N/A     Occupational History     Not on file.     Social History Main Topics     Smoking status: Never Smoker     Smokeless tobacco: Never Used     Alcohol use Yes     Drug use: No     Sexual activity: Yes     Partners: Female     Other Topics Concern     Not on file     Social History Narrative       FH: No family history on file.    Objective:  Data  "Unavailable 59 Data Unavailable 124/68 5' 10.75\" 200 lbs 0 oz    PT and DP pulses are 2/4 bilaterally. CRT is 3 seconds. Positive pedal hair.   Gross sensation is intact bilaterally.   Equinus is not noted bilaterally. No pain with active or passive ROM of the ankle, MTJ, 1st ray, or halluces bilaterally,.   Nails normal bilaterally. No open lesions are noted.  There are hyperkeratotic lesions with 5 on the right foot and one on the left.  The lesions on the right foot are mostly concentrated around the balls of the foot.  These were sharply debrided with a 15 blade, and exhibited loss of skin tension lines, pain with lateral compression, and pinpoint bleeding, consistent with verruca plantaris.  There is also a lesion with the same characteristics on the plantar left foot in the midfoot.      Assessment: Bilateral verruca plantaris - failed over-the-counter treatments.     Plan:  - Pt seen and evaluated.  -I discussed the treatment options with him today.  I related that verruca can sometimes be difficult to care.  He would like to try to have these destroyed with liquid nitrogen today.  I discussed the risks, complications, benefits, alternatives, and answered all his questions.  After debridement to pinpoint bleeding,  I applied liquid nitrogen in 3 applications of 5 seconds each to the right lesions.  Because of the blistering effect, we will leave the left foot alone today.  I related to him to leave all of the crusty blistered skin alone.  He should keep my dressing intact until tomorrow.  Then, he can shower normally but he is to leave the blister intact.  On Saturday he can start to use over-the-counter salicylic acid on all of the areas.  I will also start him on a course of zinc sulfate 50 elemental units twice daily.  This was sent to the pharmacy.    -See again in 3 weeks.  If no response after 3 or 4 visits with me, will send to dermatology.           "

## 2018-03-21 NOTE — PATIENT INSTRUCTIONS
Thanks for coming today.  [facility]  [address] [place] [address]    To schedule future appointments in Ortho Clinic, you may call [phone].    To schedule ordered imaging by your provider:   Call [facility] [phone]    To schedule an injection ordered by your provider:  Call [facility] Injection scheduling line: [phone]  [email or web address] available online at:  [email or web address]    Please call if any further questions or concerns ([phone]).  Clinic hours 8 am to 5 pm.    Return to clinic (call) if symptoms worsen or fail to improve.

## 2018-03-27 ENCOUNTER — OFFICE VISIT (OUTPATIENT)
Dept: FAMILY MEDICINE | Facility: CLINIC | Age: 58
End: 2018-03-27
Payer: COMMERCIAL

## 2018-03-27 VITALS
TEMPERATURE: 98.4 F | OXYGEN SATURATION: 97 % | HEART RATE: 40 BPM | BODY MASS INDEX: 27.81 KG/M2 | SYSTOLIC BLOOD PRESSURE: 140 MMHG | WEIGHT: 198 LBS | DIASTOLIC BLOOD PRESSURE: 77 MMHG

## 2018-03-27 DIAGNOSIS — Z11.59 NEED FOR HEPATITIS C SCREENING TEST: ICD-10-CM

## 2018-03-27 DIAGNOSIS — R10.12 LUQ ABDOMINAL PAIN: Primary | ICD-10-CM

## 2018-03-27 LAB
ALBUMIN SERPL-MCNC: 4.2 G/DL (ref 3.4–5)
ALBUMIN UR-MCNC: NEGATIVE MG/DL
ALP SERPL-CCNC: 52 U/L (ref 40–150)
ALT SERPL W P-5'-P-CCNC: 29 U/L (ref 0–70)
ANION GAP SERPL CALCULATED.3IONS-SCNC: 7 MMOL/L (ref 3–14)
APPEARANCE UR: CLEAR
AST SERPL W P-5'-P-CCNC: 19 U/L (ref 0–45)
BACTERIA #/AREA URNS HPF: ABNORMAL /HPF
BASOPHILS # BLD AUTO: 0 10E9/L (ref 0–0.2)
BASOPHILS NFR BLD AUTO: 0.3 %
BILIRUB SERPL-MCNC: 0.8 MG/DL (ref 0.2–1.3)
BILIRUB UR QL STRIP: NEGATIVE
BUN SERPL-MCNC: 16 MG/DL (ref 7–30)
CALCIUM SERPL-MCNC: 9.4 MG/DL (ref 8.5–10.1)
CHLORIDE SERPL-SCNC: 104 MMOL/L (ref 94–109)
CO2 SERPL-SCNC: 27 MMOL/L (ref 20–32)
COLOR UR AUTO: YELLOW
CREAT SERPL-MCNC: 0.83 MG/DL (ref 0.66–1.25)
DIFFERENTIAL METHOD BLD: ABNORMAL
EOSINOPHIL # BLD AUTO: 0.2 10E9/L (ref 0–0.7)
EOSINOPHIL NFR BLD AUTO: 2.8 %
ERYTHROCYTE [DISTWIDTH] IN BLOOD BY AUTOMATED COUNT: 12.8 % (ref 10–15)
GFR SERPL CREATININE-BSD FRML MDRD: >90 ML/MIN/1.7M2
GLUCOSE SERPL-MCNC: 91 MG/DL (ref 70–99)
GLUCOSE UR STRIP-MCNC: NEGATIVE MG/DL
HCT VFR BLD AUTO: 41.5 % (ref 40–53)
HGB BLD-MCNC: 14 G/DL (ref 13.3–17.7)
HGB UR QL STRIP: ABNORMAL
KETONES UR STRIP-MCNC: NEGATIVE MG/DL
LEUKOCYTE ESTERASE UR QL STRIP: NEGATIVE
LIPASE SERPL-CCNC: 236 U/L (ref 73–393)
LYMPHOCYTES # BLD AUTO: 2.3 10E9/L (ref 0.8–5.3)
LYMPHOCYTES NFR BLD AUTO: 35.1 %
MCH RBC QN AUTO: 32.4 PG (ref 26.5–33)
MCHC RBC AUTO-ENTMCNC: 33.7 G/DL (ref 31.5–36.5)
MCV RBC AUTO: 96 FL (ref 78–100)
MONOCYTES # BLD AUTO: 0.5 10E9/L (ref 0–1.3)
MONOCYTES NFR BLD AUTO: 8.3 %
NEUTROPHILS # BLD AUTO: 3.4 10E9/L (ref 1.6–8.3)
NEUTROPHILS NFR BLD AUTO: 53.5 %
NITRATE UR QL: NEGATIVE
NON-SQ EPI CELLS #/AREA URNS LPF: ABNORMAL /LPF
PH UR STRIP: 5.5 PH (ref 5–7)
PLATELET # BLD AUTO: 138 10E9/L (ref 150–450)
POTASSIUM SERPL-SCNC: 4.1 MMOL/L (ref 3.4–5.3)
PROT SERPL-MCNC: 7.4 G/DL (ref 6.8–8.8)
RBC # BLD AUTO: 4.32 10E12/L (ref 4.4–5.9)
RBC #/AREA URNS AUTO: ABNORMAL /HPF
SODIUM SERPL-SCNC: 138 MMOL/L (ref 133–144)
SOURCE: ABNORMAL
SP GR UR STRIP: 1.02 (ref 1–1.03)
UROBILINOGEN UR STRIP-ACNC: 0.2 EU/DL (ref 0.2–1)
WBC # BLD AUTO: 6.4 10E9/L (ref 4–11)
WBC #/AREA URNS AUTO: ABNORMAL /HPF

## 2018-03-27 PROCEDURE — 36415 COLL VENOUS BLD VENIPUNCTURE: CPT | Performed by: NURSE PRACTITIONER

## 2018-03-27 PROCEDURE — 81001 URINALYSIS AUTO W/SCOPE: CPT | Performed by: NURSE PRACTITIONER

## 2018-03-27 PROCEDURE — 86803 HEPATITIS C AB TEST: CPT | Performed by: NURSE PRACTITIONER

## 2018-03-27 PROCEDURE — 99214 OFFICE O/P EST MOD 30 MIN: CPT | Performed by: NURSE PRACTITIONER

## 2018-03-27 PROCEDURE — 83690 ASSAY OF LIPASE: CPT | Performed by: NURSE PRACTITIONER

## 2018-03-27 PROCEDURE — 80053 COMPREHEN METABOLIC PANEL: CPT | Performed by: NURSE PRACTITIONER

## 2018-03-27 PROCEDURE — 85025 COMPLETE CBC W/AUTO DIFF WBC: CPT | Performed by: NURSE PRACTITIONER

## 2018-03-27 ASSESSMENT — PAIN SCALES - GENERAL: PAINLEVEL: MODERATE PAIN (4)

## 2018-03-27 NOTE — PROGRESS NOTES
SUBJECTIVE:   Tremayne Galarza is a 58 year old male who presents to clinic today for the following health issues:      ABDOMINAL PAIN     Onset: 4 days ago    Description:   Character: Dull ache  Location: left upper quadrant  Radiation: None    Intensity: mild, moderate    Progression of Symptoms:  worsening    Accompanying Signs & Symptoms:  Fever/Chills?: no   Gas/Bloating: YES- bloating  Nausea: no   Vomitting: no   Diarrhea?: no   Constipation:no   Dysuria or Hematuria: no    History:   Trauma: no   Previous similar pain: no    Previous tests done: none    Precipitating factors:   Does the pain change with:     Food: no      BM: no     Urination: no     Alleviating factors:  none    Therapies Tried and outcome: none    LMP:  not applicable       -------------------------------------    38-year-old male presents with left upper quadrant abdominal pain ×4 days as above.  No history of similar issue.  No nausea vomiting.  Bowel movements normal.  Last bowel movement this morning.  Eating does not phase it.  Feels like there is a pressure in the area.  No gas.  No fever.  Drinks alcohol a few beers per week.  No smoking in his lifetime.  Pain is becoming more constant. Leaving for vacation tomorrow to [place].    Problem list and histories reviewed & adjusted, as indicated.  Additional history: as documented    Patient Active Problem List   Diagnosis     Chronic ischemic heart disease     Benign neoplasm of colon     Hyperlipidemia with target LDL less than 100     HTN, goal below 140/90     Lesion of plantar nerve     Obesity     Allergic rhinitis     Postsurgical percutaneous transluminal coronary angioplasty status     Nondependent alcohol abuse     Status post coronary angiogram     CAD (coronary artery disease)     LUQ abdominal pain     Past Surgical History:   Procedure Laterality Date     CARDIAC SURGERY       COLONOSCOPY N/A 12/30/2015    Procedure: COLONOSCOPY;  Surgeon: Duane, William Charles, MD;  Location:  [facility]     COLONOSCOPY WITH CO2 INSUFFLATION N/A 12/30/2015    Procedure: COLONOSCOPY WITH CO2 INSUFFLATION;  Surgeon: Duane, William Charles, MD;  Location: [facility] OR       Social History   Substance Use Topics     Smoking status: Never Smoker     Smokeless tobacco: Never Used     Alcohol use Yes     History reviewed. No pertinent family history.      Current Outpatient Prescriptions   Medication Sig Dispense Refill     Zinc Sulfate 220 (50 ZN) MG TABS Take 1 tablet by mouth 2 times daily 60 tablet 0     atorvastatin (LIPITOR) 80 MG tablet TAKE ONE TABLET BY MOUTH EVERY DAY 30 tablet 6     atenolol (TENORMIN) 25 MG tablet Take 1 tablet (25 mg) by mouth daily 90 tablet 2     fexofenadine (ALLEGRA) 60 MG tablet Take 0.5 tablets (30 mg) by mouth daily 60 tablet 3     aspirin 325 MG tablet Take 1 tablet (325 mg) by mouth daily 90 tablet 3     sildenafil (VIAGRA) 50 MG tablet as needed for ED       No Known Allergies  BP Readings from Last 3 Encounters:   03/27/18 140/77   03/21/18 124/68   08/24/17 123/70    Wt Readings from Last 3 Encounters:   03/27/18 198 lb (89.8 kg)   03/21/18 200 lb (90.7 kg)   01/09/17 218 lb 14.4 oz (99.3 kg)                    Reviewed and updated as needed this visit by clinical staff  Tobacco  Allergies  Meds  Problems  Med Hx  Surg Hx  Fam Hx  Soc Hx        Reviewed and updated as needed this visit by Provider  Allergies  Meds  Problems         ROS:  Constitutional, HEENT, cardiovascular, pulmonary, gi and gu systems are negative, except as otherwise noted.    OBJECTIVE:     /77 (BP Location: Left arm, Patient Position: Chair, Cuff Size: Adult Large)  Pulse (!) 40  Temp 98.4  F (36.9  C) (Oral)  Wt 198 lb (89.8 kg)  SpO2 97%  BMI 27.81 kg/m2  Body mass index is 27.81 kg/(m^2).  GENERAL: healthy, alert and no distress  NECK: no adenopathy, no asymmetry, masses, or scars and thyroid normal to palpation  RESP: lungs clear to auscultation - no rales, rhonchi or wheezes  CV:  regular rate and rhythm, normal S1 S2, no S3 or S4, no murmur, click or rub, no peripheral edema and peripheral pulses strong  ABDOMEN: soft, nontender, no hepatosplenomegaly, no masses and bowel sounds normal  MS: no gross musculoskeletal defects noted, no edema  SKIN: no suspicious lesions or rashes  PSYCH: mentation appears normal, affect normal/bright    Diagnostic Test Results:  Results for orders placed or performed in visit on 03/27/18 (from the past 24 hour(s))   CBC with platelets differential   Result Value Ref Range    WBC 6.4 4.0 - 11.0 10e9/L    RBC Count 4.32 (L) 4.4 - 5.9 10e12/L    Hemoglobin 14.0 13.3 - 17.7 g/dL    Hematocrit 41.5 40.0 - 53.0 %    MCV 96 78 - 100 fl    MCH 32.4 26.5 - 33.0 pg    MCHC 33.7 31.5 - 36.5 g/dL    RDW 12.8 10.0 - 15.0 %    Platelet Count 138 (L) 150 - 450 10e9/L    Diff Method Automated Method     % Neutrophils 53.5 %    % Lymphocytes 35.1 %    % Monocytes 8.3 %    % Eosinophils 2.8 %    % Basophils 0.3 %    Absolute Neutrophil 3.4 1.6 - 8.3 10e9/L    Absolute Lymphocytes 2.3 0.8 - 5.3 10e9/L    Absolute Monocytes 0.5 0.0 - 1.3 10e9/L    Absolute Eosinophils 0.2 0.0 - 0.7 10e9/L    Absolute Basophils 0.0 0.0 - 0.2 10e9/L   UA reflex to Microscopic and Culture   Result Value Ref Range    Color Urine Yellow     Appearance Urine Clear     Glucose Urine Negative NEG^Negative mg/dL    Bilirubin Urine Negative NEG^Negative    Ketones Urine Negative NEG^Negative mg/dL    Specific Gravity Urine 1.020 1.003 - 1.035    Blood Urine Small (A) NEG^Negative    pH Urine 5.5 5.0 - 7.0 pH    Protein Albumin Urine Negative NEG^Negative mg/dL    Urobilinogen Urine 0.2 0.2 - 1.0 EU/dL    Nitrite Urine Negative NEG^Negative    Leukocyte Esterase Urine Negative NEG^Negative    Source Midstream Urine    Urine Microscopic   Result Value Ref Range    WBC Urine 0 - 5 OTO5^0 - 5 /HPF    RBC Urine 2-5 (A) OTO2^O - 2 /HPF    Squamous Epithelial /LPF Urine Few FEW^Few /LPF    Bacteria Urine Few (A)  NEG^Negative /HPF       ASSESSMENT/PLAN:     1. LUQ abdominal pain  Unclear etiology. Labs and US pending.  - CBC with platelets differential  - Comprehensive metabolic panel  - Lipase  - UA reflex to Microscopic and Culture  - US Abdomen Complete; Future  - Urine Microscopic  Labs normal so far. Other labs pending  US at 7am Wed, 3/28 at [facility] Entrance Letter B to check in  Nothing to eat or drink after 11 pm. Ok to take morning medications - only drink enough water to take the pills  Further plan pending results of testing  If worsening over night, please go to emergency department    2. Need for hepatitis C screening test  Done today as we are already drawing bloow  - Hepatitis C Screen Reflex to HCV RNA Quant and Genotype    See Patient Instructions    The benefits, risks and potential side effects were discussed in detail. Black box warnings discussed as relevant. All patient questions were answered. The patient was instructed to follow up immediately if any adverse reactions develop.    Patient verbalizes understanding and agrees with plan of care. Patient stable for discharge.      SYLVIA Rizzo [facility]

## 2018-03-27 NOTE — MR AVS SNAPSHOT
After Visit Summary   3/27/2018    Tremayne Galarza    MRN: [id number]           Patient Information     Date Of Birth          1960        Visit Information        Provider Department      3/27/2018 1:20 PM Abby Tran APRN CNP [facility]        Today's Diagnoses     LUQ abdominal pain    -  1    Need for hepatitis C screening test          Care Instructions    Labs normal so far. Other labs pending  US at 7am Wed, 3/28 at [facility] Entrance Letter B to check in  Nothing to eat or drink after 11 pm. Ok to take morning medications - only drink enough water to take the pills  Further plan pending results of testing  If worsening over night, please go to emergency department    At [facility], we strive to deliver an exceptional experience to you, every time we see you.  If you receive a survey in the mail, please send us back your thoughts. We really do value your feedback.    Based on your medical history, these are the current health maintenance/preventive care services that you are due for (some may have been done at this visit.)  Health Maintenance Due   Topic Date Due     HEPATITIS C SCREENING  02/21/1980     ADVANCE DIRECTIVE PLANNING Q5 YRS  02/21/2017     TETANUS IMMUNIZATION (SYSTEM ASSIGNED)  11/07/2017         Suggested websites for health information:  Www.Starfish Retention Solutions.Instabug : Up to date and easily searchable information on multiple topics.  Www.medlineplus.gov : medication info, interactive tutorials, watch real surgeries online  Www.familydoctor.org : good info from the Academy of Family Physicians  Www.cdc.gov : public health info, travel advisories, epidemics (H1N1)  Www.aap.org : children's health info, normal development, vaccinations  Www.health.[facility].[place].us : [place] dept of health, public health issues in [place], N1N1    Your care team:                            Family Medicine Internal Medicine   MD Deep Christensen MD Shantel  MD Susanna Tapia PA-C, MD Pediatrics   MARY Villarreal, RAJESH Ocampo APRN CNP   MD Shabnam Damico MD Deborah Mielke, MD Kim Thein, APRN [facility]      Clinic hours: Monday - Thursday 7 am-7 pm; Fridays 7 am-5 pm.   Urgent care: Monday - Friday 11 am-9 pm; Saturday and Sunday 9 am-5 pm.  Pharmacy : Monday -Thursday 8 am-8 pm; Friday 8 am-6 pm; Saturday and Sunday 9 am-5 pm.     Clinic: [phone]   Pharmacy: [phone]            Follow-ups after your visit        Your next 10 appointments already scheduled     Mar 28, 2018  7:15 AM CDT   (Arrive by 7:00 AM)   US ABDOMEN COMPLETE with MGUS1, MG US TECH   [facility])    [address] [place] [address] [phone]           Please bring a list of your medicines (including vitamins, minerals and over-the-counter drugs). Also, tell your doctor about any allergies you may have. Wear comfortable clothes and leave your valuables at home.  Adults: No eating or drinking for 8 hours before the exam. You may take medicine with a small sip of water.  Children: - Children 6+ years: No food or drink for 6 hours before exam. - Children 1-5 years: No food or drink for 4 hours before exam. - Infants, breast-fed: may have breast milk up to 2 hours before exam. - Infants, formula: may have bottle until 4 hours before exam.  Please call the Imaging Department at your exam site with any questions.            Apr 11, 2018  8:40 AM CDT   Return Visit with Zain Samuels DPM   [facility])    [address] [place] [address] [phone]            Apr 30, 2018  4:10 PM CDT   Return Visit with Tomás West MD   [facility])    [address] [place] [address]   [phone]            Jul 10, 2018  8:00 AM CDT    [facility] Visit with Radha [facility]a MD   [facility]    [address] [place] [address] [phone]              Future tests that were ordered for you today     Open Future Orders        Priority Expected Expires Ordered    US Abdomen Complete STAT  3/27/2019 3/27/2018            Who to contact     If you have questions or need follow up information about today's clinic visit or your schedule please contact [facility] directly at [phone].  Normal or non-critical lab and imaging results will be communicated to you by Game Crafthart, letter or phone within 4 business days after the clinic has received the results. If you do not hear from us within 7 days, please contact the clinic through [email or web address].met or phone. If you have a critical or abnormal lab result, we will notify you by phone as soon as possible.  Submit refill requests through [employer] or call your pharmacy and they will forward the refill request to us. Please allow 3 business days for your refill to be completed.          Additional Information About Your Visit        Game Crafthart Information     [employer] gives you secure access to your electronic health record. If you see a primary care provider, you can also send messages to your care team and make appointments. If you have questions, please call your primary care clinic.  If you do not have a primary care provider, please call [phone] and they will assist you.        Care EveryWhere ID     This is your Care EveryWhere ID. This could be used by other organizations to access your [place] medical records  [id number]        Your Vitals Were     Pulse Temperature Pulse Oximetry BMI (Body Mass Index)          40 98.4  F (36.9  C) (Oral) 97% 27.81 kg/m2         Blood Pressure from Last 3 Encounters:   03/27/18 140/77   03/21/18 124/68   08/24/17 123/70    Weight from Last 3 Encounters:   03/27/18 198 lb (89.8 kg)   03/21/18 200 lb  (90.7 kg)   01/09/17 218 lb 14.4 oz (99.3 kg)              We Performed the Following     CBC with platelets differential     Comprehensive metabolic panel     Hepatitis C Screen Reflex to HCV RNA Quant and Genotype     Lipase     UA reflex to Microscopic and Culture     Urine Microscopic        Primary Care Provider Office Phone # Fax #    [place], SYLVIA MENA [phone]       [address]  [place] [address]        Equal Access to Services     CAROL DAVIS : Hadii aad ku hadasho Soomaali, waaxda luqadaha, qaybta kaalmada adeegyada, waxay idiin hayaan adeeg kharash la'aan . So [facility] [phone].    ATENCIÓN: Si habla español, tiene a nj disposición servicios gratuitos de asistencia lingüística. Ll[facility] [phone].    We comply with applicable federal civil rights laws and [place] laws. We do not discriminate on the basis of race, color, national origin, age, disability, sex, sexual orientation, or gender identity.            Thank you!     Thank you for choosing [facility]  for your care. Our goal is always to provide you with excellent care. Hearing back from our patients is one way we can continue to improve our services. Please take a few minutes to complete the written survey that you may receive in the mail after your visit with us. Thank you!             Your Updated Medication List - Protect others around you: Learn how to safely use, store and throw away your medicines at www.disposemymeds.org.          This list is accurate as of 3/27/18  2:36 PM.  Always use your most recent med list.                   Brand Name Dispense Instructions for use Diagnosis    aspirin 325 MG tablet     90 tablet    Take 1 tablet (325 mg) by mouth daily    Coronary artery disease involving native coronary artery of native heart without angina pectoris       atenolol 25 MG tablet    TENORMIN    90 tablet    Take 1 tablet (25 mg) by mouth daily    Coronary artery  disease involving native coronary artery of native heart without angina pectoris       atorvastatin 80 MG tablet    LIPITOR    30 tablet    TAKE ONE TABLET BY MOUTH EVERY DAY    Hypercholesteremia       fexofenadine 60 MG tablet    ALLEGRA    60 tablet    Take 0.5 tablets (30 mg) by mouth daily    Seasonal allergic rhinitis       sildenafil 50 MG tablet    VIAGRA     as needed for ED        Zinc Sulfate 220 (50 ZN) MG Tabs     60 tablet    Take 1 tablet by mouth 2 times daily    Verruca pedis

## 2018-03-27 NOTE — PATIENT INSTRUCTIONS
Labs normal so far. Other labs pending  US at 7am Wed, 3/28 at [facility] Entrance Letter B to check in  Nothing to eat or drink after 11 pm. Ok to take morning medications - only drink enough water to take the pills  Further plan pending results of testing  If worsening over night, please go to emergency department    At [facility], we strive to deliver an exceptional experience to you, every time we see you.  If you receive a survey in the mail, please send us back your thoughts. We really do value your feedback.    Based on your medical history, these are the current health maintenance/preventive care services that you are due for (some may have been done at this visit.)  Health Maintenance Due   Topic Date Due     HEPATITIS C SCREENING  02/21/1980     ADVANCE DIRECTIVE PLANNING Q5 YRS  02/21/2017     TETANUS IMMUNIZATION (SYSTEM ASSIGNED)  11/07/2017         Suggested websites for health information:  Www.MashWorx.KienVe : Up to date and easily searchable information on multiple topics.  Www.medlineplus.gov : medication info, interactive tutorials, watch real surgeries online  Www.familydoctor.org : good info from the Academy of Family Physicians  Www.cdc.gov : public health info, travel advisories, epidemics (H1N1)  Www.aap.org : children's health info, normal development, vaccinations  Www.health.state.[place].us : [place] dept of health, public health issues in [place], N1N1    Your care team:                            Family Medicine Internal Medicine   MD Deep Christensen MD Shantel Branch-Fleming, MD Katya Georgiev PA-C Nam Ho, MD Pediatrics   MARY Villarreal, MD Shabnam Ma CNP, MD Deborah Mielke, MD Kim Thein, APRN CNP      Clinic hours: Monday - Thursday 7 am-7 pm; Fridays 7 am-5 pm.   Urgent care: Monday - Friday 11 am-9 pm; Saturday and Sunday 9 am-5 pm.  Pharmacy : Monday -Thursday 8 am-8 pm; Friday  8 am-6 pm; Saturday and Sunday 9 am-5 pm.     Clinic: [phone]   Pharmacy: [phone]

## 2018-03-28 ENCOUNTER — RADIANT APPOINTMENT (OUTPATIENT)
Dept: ULTRASOUND IMAGING | Facility: CLINIC | Age: 58
End: 2018-03-28
Attending: NURSE PRACTITIONER
Payer: COMMERCIAL

## 2018-03-28 DIAGNOSIS — R10.12 LUQ ABDOMINAL PAIN: ICD-10-CM

## 2018-03-28 LAB — HCV AB SERPL QL IA: NONREACTIVE

## 2018-03-28 PROCEDURE — 76700 US EXAM ABDOM COMPLETE: CPT

## 2018-04-11 ENCOUNTER — OFFICE VISIT (OUTPATIENT)
Dept: PODIATRY | Facility: CLINIC | Age: 58
End: 2018-04-11
Payer: COMMERCIAL

## 2018-04-11 VITALS — SYSTOLIC BLOOD PRESSURE: 130 MMHG | DIASTOLIC BLOOD PRESSURE: 76 MMHG | OXYGEN SATURATION: 97 % | HEART RATE: 54 BPM

## 2018-04-11 DIAGNOSIS — B07.0 VERRUCA PLANTARIS: Primary | ICD-10-CM

## 2018-04-11 PROCEDURE — 17110 DESTRUCTION B9 LES UP TO 14: CPT | Performed by: PODIATRIST

## 2018-04-11 NOTE — LETTER
4/11/2018         RE: Tremayne Galarza  [address] [address]        Dear Colleague,    Thank you for referring your patient, Tremayne Galarza, to the [facility]. Please see a copy of my visit note below.    Chief Complaint:   Chief Complaint   Patient presents with     RECHECK     Follow up for warts on feet         No Known Allergies      Subjective: Tremayne is a 58 year old male who presents to the clinic today for a follow up of BL warts. He relates that he did start the PO zinc.  He relates that for the first couple of weeks he did use the acid pads.  He relates that he has not done this over the last week, as it is getting expensive.  He relates that the areas did blister.  He thinks that they are less painful although now when he is walking.    Objective  Data Unavailable 54 Data Unavailable 130/76 Data Unavailable 0 lbs 0 oz  4 hyperkeratotic lesions are noted on the plantar foot. There is one noted under the third metatarsal head.  This was debrided, and there was a return of skin tension lines with no pain with lateral compression.  There are 3 remaining verruca noted.  One is plantar first metatarsal head, midfoot of the plantar foot on the right, and at the around fifth metatarsal head.  These are all sharply debrided to pinpoint bleeding.  The lesions exhibited loss of skin tension lines and pain with lateral compression.  There is also a verruca noted on the left foot at the plantar fifth metatarsal head.    Assessment: Bilateral verruca.  Currently treating the right side.  The lesions have decreased from 5-3 today.  There are 3 lesions remaining on less thick and less painful than the last visit.    Plan:   - Pt seen and evaluated  -I discussed with him the treatment options for this.  He would like to again try some liquid nitrogen.  Liquid nitrogen was applied to all 3 lesions for about 5 seconds and 3 applications.  I instructed him as last time to not pick at the  blisters, and to leave these intact.  He can continue with OTC acid pads and with the oral zinc.  - Pt to return to clinic in 3 weeks.      Again, thank you for allowing me to participate in the care of your patient.        Sincerely,        Zain Sameuls DPM

## 2018-04-11 NOTE — MR AVS SNAPSHOT
After Visit Summary   2018    Tremayne Galarza    MRN: [id number]           Patient Information     Date Of Birth          1960        Visit Information        Provider Department      2018 8:40 AM Zain Samuels DPM [facility]        Today's Diagnoses     Verruca plantaris    -  1      Care Instructions    Thanks for coming today.  Ortho/Sports Medicine Clinic  [address] [place] [address]    To schedule future appointments in Ortho Clinic, you may call [phone].    To schedule ordered imaging by your provider:   Call Central Imaging [phone]    To schedule an injection ordered by your provider:  Call Central Imaging Injection scheduling line: [phone]  Crunchfishhart available online at:  [email or web address]    Please call if any further questions or concerns ([phone]).  Clinic hours 8 am to 5 pm.    Return to clinic (call) if symptoms worsen or fail to improve.            Follow-ups after your visit        Follow-up notes from your care team     Return in about 3 weeks (around 2018).      Your next 10 appointments already scheduled     2018  4:10 PM CDT   [facility]   [facility]    [address] [place] [id number] [phone]            May 02, 2018  7:00 AM CDT   Return Visit with Zain Samuels DPM   [facility]    [address] [place] [address] [phone]            Jul 10, 2018  8:00 AM CDT   Return Visit with Radha Orona MD   [facility]    [address] [facility] [id number] [phone]              Who to contact     If you have questions or need follow up information about today's clinic visit or your schedule please contact [facility] directly at  [phone].  Normal or non-critical lab and imaging results will be communicated to you by GenZum Life Scienceshart, letter or phone within 4 business days after the clinic has received the results. If you do not hear from us within 7 days, please contact the clinic through GenZum Life Scienceshart or phone. If you have a critical or abnormal lab result, we will notify you by phone as soon as possible.  Submit refill requests through Stereomood or call your pharmacy and they will forward the refill request to us. Please allow 3 business days for your refill to be completed.          Additional Information About Your Visit        GenZum Life SciencesharSalesforce Information     Ste[employer]ood gives you secure access to your electronic health record. If you see a primary care provider, you can also send messages to your care team and make appointments. If you have questions, please call your primary care clinic.  If you do not have a primary care provider, please call [phone] and they will assist you.      Stereomood is an electronic gateway that provides easy, online access to your medical records. With Stereomood, you can request a clinic appointment, read your test results, renew a prescription or communicate with your care team.     To access your existing account, please contact your [facility] or call [phone] for assistance.        Care EveryWhere ID     This is your Care EveryWhere ID. This could be used by other organizations to access your [place] medical records  [id number]        Your Vitals Were     Pulse Pulse Oximetry                54 97%           Blood Pressure from Last 3 Encounters:   04/11/18 130/76   03/27/18 140/77   03/21/18 124/68    Weight from Last 3 Encounters:   03/27/18 89.8 kg (198 lb)   03/21/18 90.7 kg (200 lb)   01/09/17 99.3 kg (218 lb 14.4 oz)              We Performed the Following     DESTRUCT BENIGN LESION, UP TO 14        Primary Care Provider Office Phone # Fax #    SYLVIA Nair CNP  [phone]       [address]  [place] [address]        Equal Access to Services     CAROL DAVIS : Hadii aad ku hadjennifer Sojenniffer, wanehada luqgage, qasilta kaalmada tonya, isha izquierdoeugenia luther. So [facility] [phone].    ATENCIÓN: Si habla español, tiene a nj disposición servicios gratuitos de asistencia lingüística. Llame al [phone].    We comply with applicable federal civil rights laws and [place] laws. We do not discriminate on the basis of race, color, national origin, age, disability, sex, sexual orientation, or gender identity.            Thank you!     Thank you for choosing [facility]  for your care. Our goal is always to provide you with excellent care. Hearing back from our patients is one way we can continue to improve our services. Please take a few minutes to complete the written survey that you may receive in the mail after your visit with us. Thank you!             Your Updated Medication List - Protect others around you: Learn how to safely use, store and throw away your medicines at www.disposemymeds.org.          This list is accurate as of 4/11/18  9:21 AM.  Always use your most recent med list.                   Brand Name Dispense Instructions for use Diagnosis    aspirin 325 MG tablet     90 tablet    Take 1 tablet (325 mg) by mouth daily    Coronary artery disease involving native coronary artery of native heart without angina pectoris       atenolol 25 MG tablet    TENORMIN    90 tablet    Take 1 tablet (25 mg) by mouth daily    Coronary artery disease involving native coronary artery of native heart without angina pectoris       atorvastatin 80 MG tablet    LIPITOR    30 tablet    TAKE ONE TABLET BY MOUTH EVERY DAY    Hypercholesteremia       fexofenadine 60 MG tablet    ALLEGRA    60 tablet    Take 0.5 tablets (30 mg) by mouth daily    Seasonal allergic rhinitis       sildenafil 50 MG tablet    VIAGRA     as  needed for ED        Zinc Sulfate 220 (50 Zn) MG Tabs     60 tablet    Take 1 tablet by mouth 2 times daily    Manuelruca pedis

## 2018-04-11 NOTE — PATIENT INSTRUCTIONS
Thanks for coming today.  Ortho/Sports Medicine Clinic  [address] [place] [address]    To schedule future appointments in Ortho Clinic, you may call [phone].    To schedule ordered imaging by your provider:   Call Central Imaging [phone]    To schedule an injection ordered by your provider:  Call Central Imaging Injection scheduling line: [phone]  Mister Mariohart available online at:  [email or web address]    Please call if any further questions or concerns ([phone]).  Clinic hours 8 am to 5 pm.    Return to clinic (call) if symptoms worsen or fail to improve.

## 2018-04-11 NOTE — NURSING NOTE
"Tremayne Galarza's goals for this visit include: Follow up for warts on feet     He requests these members of his care team be copied on today's visit information: Yes    PCP: [place]    Referring Provider:  No referring provider defined for this encounter.    Chief Complaint   Patient presents with     RECHECK     Follow up for warts on feet        Initial There were no vitals taken for this visit. Estimated body mass index is 27.81 kg/(m^2) as calculated from the following:    Height as of 3/21/18: 1.797 m (5' 10.75\").    Weight as of 3/27/18: 89.8 kg (198 lb).  Medication Reconciliation: complete   Beatriz Vasquez CMA      "

## 2018-04-11 NOTE — PROGRESS NOTES
Chief Complaint:   Chief Complaint   Patient presents with     RECHECK     Follow up for warts on feet         No Known Allergies      Subjective: Tremayne is a 58 year old male who presents to the clinic today for a follow up of BL warts. He relates that he did start the PO zinc.  He relates that for the first couple of weeks he did use the acid pads.  He relates that he has not done this over the last week, as it is getting expensive.  He relates that the areas did blister.  He thinks that they are less painful although now when he is walking.    Objective  Data Unavailable 54 Data Unavailable 130/76 Data Unavailable 0 lbs 0 oz  4 hyperkeratotic lesions are noted on the plantar foot. There is one noted under the third metatarsal head.  This was debrided, and there was a return of skin tension lines with no pain with lateral compression.  There are 3 remaining verruca noted.  One is plantar first metatarsal head, midfoot of the plantar foot on the right, and at the around fifth metatarsal head.  These are all sharply debrided to pinpoint bleeding.  The lesions exhibited loss of skin tension lines and pain with lateral compression.  There is also a verruca noted on the left foot at the plantar fifth metatarsal head.    Assessment: Bilateral verruca.  Currently treating the right side.  The lesions have decreased from 5-3 today.  There are 3 lesions remaining on less thick and less painful than the last visit.    Plan:   - Pt seen and evaluated  -I discussed with him the treatment options for this.  He would like to again try some liquid nitrogen.  Liquid nitrogen was applied to all 3 lesions for about 5 seconds and 3 applications.  I instructed him as last time to not pick at the blisters, and to leave these intact.  He can continue with OTC acid pads and with the oral zinc.  - Pt to return to clinic in 3 weeks.

## 2018-04-27 ENCOUNTER — TELEPHONE (OUTPATIENT)
Dept: CARDIOLOGY | Facility: CLINIC | Age: 58
End: 2018-04-27

## 2018-04-27 DIAGNOSIS — E78.5 HYPERLIPIDEMIA WITH TARGET LDL LESS THAN 100: Primary | ICD-10-CM

## 2018-04-27 NOTE — TELEPHONE ENCOUNTER
Patient is unsure if follow up labs are needed for his appt scheduled with Dr. West on 4/30. Please advise.

## 2018-04-27 NOTE — TELEPHONE ENCOUNTER
Called pt and schedule him for fasting labs per Dr. West on Monday. Pt had no questions or concerns at this time.  Yokasta Cochran CMA

## 2018-04-30 ENCOUNTER — OFFICE VISIT (OUTPATIENT)
Dept: CARDIOLOGY | Facility: CLINIC | Age: 58
End: 2018-04-30
Payer: COMMERCIAL

## 2018-04-30 VITALS
WEIGHT: 201.4 LBS | BODY MASS INDEX: 28.29 KG/M2 | OXYGEN SATURATION: 96 % | HEART RATE: 52 BPM | SYSTOLIC BLOOD PRESSURE: 133 MMHG | DIASTOLIC BLOOD PRESSURE: 74 MMHG

## 2018-04-30 DIAGNOSIS — E78.00 HYPERCHOLESTEREMIA: Primary | ICD-10-CM

## 2018-04-30 DIAGNOSIS — E78.5 HYPERLIPIDEMIA WITH TARGET LDL LESS THAN 100: ICD-10-CM

## 2018-04-30 LAB
CHOLEST SERPL-MCNC: 169 MG/DL
HDLC SERPL-MCNC: 54 MG/DL
LDLC SERPL CALC-MCNC: 80 MG/DL
NONHDLC SERPL-MCNC: 115 MG/DL
TRIGL SERPL-MCNC: 175 MG/DL

## 2018-04-30 PROCEDURE — 99214 OFFICE O/P EST MOD 30 MIN: CPT | Performed by: INTERNAL MEDICINE

## 2018-04-30 PROCEDURE — 80061 LIPID PANEL: CPT | Performed by: INTERNAL MEDICINE

## 2018-04-30 PROCEDURE — 36415 COLL VENOUS BLD VENIPUNCTURE: CPT | Performed by: INTERNAL MEDICINE

## 2018-04-30 RX ORDER — ROSUVASTATIN CALCIUM 40 MG/1
40 TABLET, COATED ORAL DAILY
Qty: 90 TABLET | Refills: 3 | Status: SHIPPED | OUTPATIENT
Start: 2018-04-30 | End: 2019-05-06

## 2018-04-30 ASSESSMENT — PAIN SCALES - GENERAL: PAINLEVEL: NO PAIN (0)

## 2018-04-30 NOTE — MR AVS SNAPSHOT
After Visit Summary   4/30/2018    Tremayne Galarza    MRN: [id number]           Patient Information     Date Of Birth          1960        Visit Information        Provider Department      4/30/2018 4:10 PM Tomás West MD [facility]        Today's Diagnoses     Hypercholesteremia    -  1      Care Instructions      It was a pleasure to see you in the cardiology clinic today.    If you have any questions, you can reach my nurse, Morena Perez, at [phone].     Note the new medications: CRESTOR (Rosuvastatin) 40 mg, once a day    Stop the following medications: LIPITOR (Atorovastatin)    The results from today include:         Recent Labs   Lab Test  04/30/18   0713  01/10/17   0746  02/06/15   0735 09/23/14   CHOL  169  177  146  155  155  155   HDL  54  42  44  42*  42  42   LDL  80  94  80  85  85  85   TRIG  175*  206*  110  139  139  139   CHOLHDLRATIO   --    --   3.3  3.7  3.7  3.7         Tests ordered today:   Lipids in 6 weeks    I would like you to follow up with Dr. West in 1 year.    Sincerely,      Tomás West MD     [facility]            Follow-ups after your visit        Follow-up notes from your care team     Return in about 1 year (around 4/30/2019).      Your next 10 appointments already scheduled     May 02, 2018  7:00 AM CDT   Return Visit with Zain Samuels DPM   [facility] [place])    [address] [facility] [address]   [phone]            Jul 10, 2018  8:00 AM CDT   Return Visit with Radha Orona MD   [facility] ([facility])    [address] [facility] [address]   [phone]              Future tests that were ordered for you today     Open Future Orders        Priority Expected Expires Ordered    Lipid panel reflex to direct LDL Fasting Routine 6/30/2018 10/1/2018 4/30/2018             Who to contact     If you have questions or need follow up information about today's clinic visit or your schedule please contact [facility] directly at [phone].  Normal or non-critical lab and imaging results will be communicated to you by [email or web address], letter or phone within 4 business days after the clinic has received the results. If you do not hear from us within 7 days, please contact the clinic through [email or web address] or phone. If you have a critical or abnormal lab result, we will notify you by phone as soon as possible.  Submit refill requests through [email or web address] or call your pharmacy and they will forward the refill request to us. Please allow 3 business days for your refill to be completed.          Additional Information About Your Visit        [email or web address] Information     [email or web address] gives you secure access to your electronic health record. If you see a primary care provider, you can also send messages to your care team and make appointments. If you have questions, please call your primary care clinic.  If you do not have a primary care provider, please call [phone] and they will assist you.      [email or web address] is an electronic gateway that provides easy, online access to your medical records. With [email or web address], you can request a clinic appointment, read your test results, renew a prescription or communicate with your care team.     To access your existing account, please contact your [facility] or call [phone] for assistance.        Care EveryWhere ID     This is your Care EveryWhere ID. This could be used by other organizations to access your [facility] medical records  [id number]        Your Vitals Were     Pulse Pulse Oximetry BMI (Body Mass Index)             52 96% 28.29 kg/m2          Blood Pressure from Last 3 Encounters:   04/30/18 133/74   04/11/18 130/76   03/27/18 140/77    Weight from Last 3 Encounters:   04/30/18 91.4 kg (201 lb 6.4 oz)   03/27/18 89.8 kg  (198 lb)   03/21/18 90.7 kg (200 lb)                 Today's Medication Changes          These changes are accurate as of 4/30/18  4:35 PM.  If you have any questions, ask your nurse or doctor.               Start taking these medicines.        Dose/Directions    rosuvastatin 40 MG tablet   Commonly known as:  CRESTOR   Used for:  Hypercholesteremia   Started by:  Tomás West MD        Dose:  40 mg   Take 1 tablet (40 mg) by mouth daily   Quantity:  90 tablet   Refills:  3         Stop taking these medicines if you haven't already. Please contact your care team if you have questions.     atorvastatin 80 MG tablet   Commonly known as:  LIPITOR   Stopped by:  Tomás West MD                Where to get your medicines      These medications were sent to [facility] - [place], [place] - [address], [address]  [address], [address], [facility] [address]     Phone:  [phone]     rosuvastatin 40 MG tablet                Primary Care Provider Office Phone # Fax #    Mee Antionette Park, APRN [facility] [phone]       [address]  [place] [address]        Equal Access to Services     CAROL DAVIS AH: Hadii aad ku hadasho Soomaali, waaxda luqadaha, qaybta kaalmada adeegyada, waxay miley haycuongn stanislaw luther. So [facility] [phone].    ATENCIÓN: Si habla español, tiene a nj disposición servicios gratuitos de asistencia lingüística. Ll[facility] [phone].    We comply with applicable federal civil rights laws and [place] laws. We do not discriminate on the basis of race, color, national origin, age, disability, sex, sexual orientation, or gender identity.            Thank you!     Thank you for choosing [facility]  for your care. Our goal is always to provide you with excellent care. Hearing back from our patients is one way we can continue to improve our services. Please take a few minutes to complete the written survey that  you may receive in the mail after your visit with us. Thank you!             Your Updated Medication List - Protect others around you: Learn how to safely use, store and throw away your medicines at www.disposemymeds.org.          This list is accurate as of 4/30/18  4:35 PM.  Always use your most recent med list.                   Brand Name Dispense Instructions for use Diagnosis    aspirin 325 MG tablet     90 tablet    Take 1 tablet (325 mg) by mouth daily    Coronary artery disease involving native coronary artery of native heart without angina pectoris       atenolol 25 MG tablet    TENORMIN    90 tablet    Take 1 tablet (25 mg) by mouth daily    Coronary artery disease involving native coronary artery of native heart without angina pectoris       fexofenadine 60 MG tablet    ALLEGRA    60 tablet    Take 0.5 tablets (30 mg) by mouth daily    Seasonal allergic rhinitis       rosuvastatin 40 MG tablet    CRESTOR    90 tablet    Take 1 tablet (40 mg) by mouth daily    Hypercholesteremia       sildenafil 50 MG tablet    VIAGRA     as needed for ED        Zinc Sulfate 220 (50 Zn) MG Tabs     60 tablet    Take 1 tablet by mouth 2 times daily    Verruca pedis

## 2018-04-30 NOTE — PROGRESS NOTES
"CARDIOLOGY CLINIC FOLLOW UP    HPI: Tremayne Galarza is a 58 year old male referred by Mee Park, for ongoing evaluation of CAD.  The patient's risk factor profile is: (+) HTN, (-) diabetes, (+) hyperlipidemia, (-) tobacco use, (+) family Hx CAD [maternal Hx CAD at age 38].  He has no documented history of arrhythmia, or valvular heart disease.  The patient has a history of CAD dating back to Aug 2014 when he presented to [facility] with a \"tingling sensation in his lungs\" in the absence of chest discomfort.  He was actively working out at [employer] and noticed the abnormal breathing sensation during that strenuous activity.  He had a bicycle ECHO and his LVEF failed to augment (50% --> 45%) and he had anterior-apical hypokinesis at peak exercise.  On the ECG there was a maximum of 1.0-2.0 mm of upsloping ST depression in leads II, V4, V5, V6.   A maximum of 1.0-2.0 mm. of ST elevation was present in leads avR.  He had coronary angiography on 8/27/14.  The report documents mild nonobstructive CAD in the LAD and LCx.  The RCA had subtotal stenosis in the proximal segment (stented with 3.5x24 mm JOSE) and diffuse narrowing in the mid-distal RCA up to 90% (stented with 2 overlapping JOSE, 3.5x12 and 2.75x20).  The patient had cardiac rehabilitation post coronary angiography and PCI.   I first evaluated him in March 2015 and reviewed his coronary angiogram from [facility].  There was a 60-70% stenosis in the LAD and I was concerned enough to bring him back to the cath lab and check an FFR.  The FFR was 0.78 and the stents in the RCA were widely patent.  He continued on medical therapy.  He has not had subsequent chest discomfort, SOB (including the tingling sensation he originally described), PND, orthopnea, pedal edema, palpitations, lightheadedness, and syncope.  He is active, working at [employer] 6 times a week.  He is not experiencing any cardiopulmonary symptoms during exercise.  The " patient has no history of cerebrovascular disease or PAD.    BPs in ambulatory setting well controlled.    The patient has not used SL NTG and has Rx for Viagra.  He knows not to use them within 24 hours of one another.    He remains on ASA.  However, he has not had any bleeding symptoms.       PAST MEDICAL HISTORY:  Past Medical History:   Diagnosis Date     Chronic ischemic heart disease 9/4/2014    Overview:  3 stents to RCA 8/2014      Essential hypertension 2/11/2005    Overview:  LW Onset:  23Fpi07      Hyperlipidemia 11/14/2005    Overview:  LW Onset:  14Nov05        CURRENT MEDICATIONS:  Current Outpatient Prescriptions   Medication Sig Dispense Refill     aspirin 325 MG tablet Take 1 tablet (325 mg) by mouth daily 90 tablet 3     atenolol (TENORMIN) 25 MG tablet Take 1 tablet (25 mg) by mouth daily 90 tablet 2     atorvastatin (LIPITOR) 80 MG tablet TAKE ONE TABLET BY MOUTH EVERY DAY 30 tablet 6     fexofenadine (ALLEGRA) 60 MG tablet Take 0.5 tablets (30 mg) by mouth daily 60 tablet 3     Zinc Sulfate 220 (50 ZN) MG TABS Take 1 tablet by mouth 2 times daily 60 tablet 0     sildenafil (VIAGRA) 50 MG tablet as needed for ED         PAST SURGICAL HISTORY:  Past Surgical History:   Procedure Laterality Date     CARDIAC SURGERY       COLONOSCOPY N/A 12/30/2015    Procedure: COLONOSCOPY;  Surgeon: Duane, William Charles, MD;  Location: [facility] OR     COLONOSCOPY WITH CO2 INSUFFLATION N/A 12/30/2015    Procedure: COLONOSCOPY WITH CO2 INSUFFLATION;  Surgeon: Duane, William Charles, MD;  Location: [facility] OR       ALLERGIES  Review of patient's allergies indicates no known allergies.    FAMILY HX:  No family history on file.    SOCIAL HX:  Social History     Social History     Marital Status:      Spouse Name: N/A     Number of Children: N/A     Years of Education: N/A     Social History Main Topics     Smoking status: Never Smoker      Smokeless tobacco: Never Used     Alcohol Use: Yes     Drug Use: No     Sexual  Activity:     Partners: Female     Other Topics Concern     None     Social History Narrative       ROS:  Constitutional: No fever, chills, or sweats. No weight gain/loss.   HEENT: No visual disturbance, ear ache, epistaxis, sore throat.   Allergies/Immunologic: Negative.   Respiratory: No cough, hemoptysis.   Cardiovascular: As per HPI.   GI: No nausea, vomiting, hematemesis, melena, or hematochezia.   : No urinary frequency, dysuria, or hematuria.   Integument: No rash.   Psychiatric: No anxiety / depression.   Neuro: No speech disturbance, focal sensory or motor deficit.   Endocrinology: No polyuria / polyphagia.   Musculoskeletal: No myalgia.    VITAL SIGNS:  /74 (BP Location: Left arm, Patient Position: Chair, Cuff Size: Adult Large)  Pulse 52  Wt 91.4 kg (201 lb 6.4 oz)  SpO2 96%  BMI 28.29 kg/m2  Body mass index is 28.29 kg/(m^2).  Wt Readings from Last 2 Encounters:   18 89.8 kg (198 lb)   18 90.7 kg (200 lb)       PHYSICAL EXAM  Tremayne Galarza is a 56 year old male.in no acute distress.  HEENT: Eyes Nonicteric.  Neck: JVP normal.  Carotids +3/3 bilaterally without bruits.  Lungs: CTA.  Cor: RRR. Normal S1 and S2.  No murmur, rub, or gallop.  PMI in Lf 5th ICS.  Abd: Soft, nontender, nondistended.  NABS.  No pulsatile mass.  Extremities: No C/C/E.  Pulses +3/3 symmetric in upper and lower extremities.  Neuro: Grossly intact.  Psych: A&O x 3.  Skin: No rash.    LABS  Recent Labs   Lab Test  18   0713  01/10/17   0746  02/06/15   0735 14   CHOL  169  177  146  155  155  155   HDL  54  42  44  42*  42  42   LDL  80  94  80  85  85  85   TRIG  175*  206*  110  139  139  139   CHOLHDLRATIO   --    --   3.3  3.7  3.7  3.7       EK/5/15  SB at 49.  Normal intervals and axes.  No ST shift, TWI, or Q waves.  Isolated PVC.      STRESS ECHO: 14  CONCLUSIONS  REST:  LV chamber size, wall thickness, and segmental and global wall motion are normal. No significant  valvular abnormalities are seen.  The visually estimated resting LVEF is 60 %.    STRESS:  There is lack of augmentation of LVEF during peak exercise.  Anterior-apical hypokinesis is suggested.    CONCLUSIONS:  Abnormal exercise echocardiogram with adequate heart rate and workload.  There is lack of augmentation of LVEF during peak exercise.  Anterior-apical hypokinesis is suggested.  Risk stratification by stress echocardiography is identified as intermediate to high risk.    REST ECG: Mild nonspecific ST-T segments.  Resting HR:62 bpmResting BP:122/76 mmHg  Pre-Stress Physical Exam  Current patient medications that may affect electrocardiographic  interpretation.: Atenolol, held for greater than 48 hours.    STRESS  Stress Type: Bike Ergometer  Peak HR: 146 bpm HR Response: Normal  Peak BP: 210/99 mmHg BP Response: Abnormal  Max Predicted HR: 168 bpm HR BP Product: 29039  % of Max Predicted HR: 87 Max Exercise: 9 METS  Test Duration: 14.3 min  Reason for Termination: Fatigue Exercise Effort: Good  Risk Stratification: Intermediate risk    RESULTS  Global LVEF (rest): Normal (LVEF >50%)  Global LVEF (stress): Mildly depressed (LVEF 40-50%)    ECG: A maximum of 1.0-2.0 mm of upsloping ST depression was present in leads II, V4, V5, V6.   A maximum of 1.0-2.0 mm. of ST elevation was present in leads avR.  Q waves were NOT present in leads with ST elevation.    SYMPTOMS  Chest pain was reproduced and did NOT require termination of the test. Pt complained of 3/10 chest burning with peak exercise.  Protocol completed. Predicted heart rate achieved. Hypertensive response to exercise. Shortness of breath.  Musculoskeletal.  Fatigue.    CARDIAC CATH: 8/27/14  The coronary circulation is right dominant.  Left main coronary artery   Left main: Normal.  Left anterior descending artery and branches  LAD: Normal. Moderate calcification, nonobstructive disease.  Left circumflex artery and branches  Circumflex: Moderate  calcification, nonobstructive disease.  Right coronary artery and branches  RCA: This vessel is subtotally occluded in the proximal portion. There is a long segment of disease in the mid/distal portion with JOE 2 flow. Both of these lesions were successfully stented with an everolimus eluting  stents. Proximal RCA: Mid RCA:    Interventions  LESION #1 INTERVENTION: A successful drug-eluting stent was performed on the 99 % lesion in the proximal RCA. Following intervention there was an excellent angiographic appearance with a 0 % residual stenosis. There was JOE 3 flow after the  Procedure.   Balloon angioplasty was performed, using a 3.0 x 15 mm Emerge MR balloon, with 3 inflations and a maximum inflation pressure of 14 juan pablo.   A 3.5 x 24 Premier Promus Stent everolimus-eluting stent was placed across the lesion and deployed at a maximum inflation pressure of 11 juan pablo.    LESION #2 INTERVENTION A successful drug-eluting stent was performed on the 90 % lesion in the mid RCA. Following intervention there was an excellent angiographic appearance with a 0 % residual stenosis.  Balloon angioplasty was performed, using a 3.0 x 15 mm Emerge MR balloon, with 1 inflations and a maximum inflation pressure of 6 juan pablo. Balloon angioplasty was performed, using a 2.75 x 20 mm Emerge MR balloon, with 4 inflations and a maximum inflation pressure of 14 juan pablo. A 2.75 x 38 premier Promus Stent everolimus-eluting stent was placed across the lesion and deployed at a maximum inflation pressure of 18 juan pablo.  A 3.5 x 12 Premier Promus Stent everolimus-eluting stent was placed across the lesion and deployed at a maximum inflation pressure of 11 juan pablo.    Coronary Angiogram (3/26/15):  Coronary angiography was performed via the Rt RADIAL artery using a 5 Fr Shiraz catheter and a 6 Fr Ikari 3.5 GC.  The LAD and LCx had separate ostia.  Both coronary vessels had moderate calcification in the proximal segment.  The LAD had 30-40% narrowing proximally  and a long 60% stenosis in the mid segment.  There was 25% narrowing at the takeoff of D3.  There were three diagonal branches, all moderate in caliber.  D1 had minimal disease.  D2 had 50% ostial narrowing.  D3 had minimal disease.  The LCx gave rise to 2 OM branches.  There was mild atherosclerosis (25%) disease in the LCx that was diffuse in nature.  The OM branches had mild disease. The RCA had a stent in the proximal segment and two overlapping stents spanning the mid-distal segment.  There was mild ISR (10%) in the stented segment.  There was 30-40% disease between the two stents.  There was minimal disease in the Rt PDA and Rt PL segments.    Physiologic assessment was performed on the LAD with the Radiwire positioned at the apex of the LAD.  The FFR of LAD was 0.78 at peak hyperemia.    Impression:    1. Single vessel CAD, angiographically borderline 60-70% long stenosis in mid LAD  2. Physiologically borderline stenosis in mid LAD    ASSESSMENT AND PLAN:    1. CAD.  Mr. Galarza has two vessel CAD and is now 29 months post PCI of the proximal, mid/distal RCA segments with JOSE.  I was concerned about the diffuse nature of the proximal / mid LAD His stress ECHO showed hypokinesis of anterior/apical segments.  He had FFR of 0.78 in the LAD.  I would like him to stay on the current Rx including ASA, Atenolol, and statin. With normal LVEF and no prior acute coronary syndrome and HTN well controlled, I do not think the addition of an ACE-I is necessary at this time.  In regard to physical exercise, I encourage him to remain active but to be vigilant regarding symptoms given the level of activity he is pursuing (kickboxing).    2. Hypercholesterolemia.   LDL rising on max dose Lipitor.  I have that we switch to Crestor 40 mg qd but the system still shows Lipitor to be active.    FOLLOW UP:  1 year      Tomás West MD    Divisions of Cardiology  [facility],  [place]    CC  Patient Care Team:  Mee Park APRN CNP as PCP - General (Family Practice)

## 2018-04-30 NOTE — PATIENT INSTRUCTIONS
It was a pleasure to see you in the cardiology clinic today.    If you have any questions, you can reach my nurse, Morena Perez, at [phone].     Note the new medications: CRESTOR (Rosuvastatin) 40 mg, once a day    Stop the following medications: LIPITOR (Atorovastatin)    The results from today include:         Recent Labs   Lab Test  04/30/18   0713  01/10/17   0746  02/06/15   0735 09/23/14   CHOL  169  177  146  155  155  155   HDL  54  42  44  42*  42  42   LDL  80  94  80  85  85  85   TRIG  175*  206*  110  139  139  139   CHOLHDLRATIO   --    --   3.3  3.7  3.7  3.7         Tests ordered today:   Lipids in 6 weeks    I would like you to follow up with Dr. West in 1 year.    Sincerely,      Tomás West MD     [facility]

## 2018-04-30 NOTE — NURSING NOTE
"Tremayne Galarza's goals for this visit include:   Chief Complaint   Patient presents with     RECHECK     Follow up       He requests these members of his care team be copied on today's visit information: PCP    PCP: Mee Park    Referring Provider:  No referring provider defined for this encounter.    Chief Complaint   Patient presents with     RECHECK     Follow up       Initial /74 (BP Location: Left arm, Patient Position: Chair, Cuff Size: Adult Large)  Pulse 52  Wt 91.4 kg (201 lb 6.4 oz)  SpO2 96%  BMI 28.29 kg/m2 Estimated body mass index is 28.29 kg/(m^2) as calculated from the following:    Height as of 3/21/18: 1.797 m (5' 10.75\").    Weight as of this encounter: 91.4 kg (201 lb 6.4 oz).  Medication Reconciliation: complete         Medication Refills: none        Yokasta Cochran CMA        "

## 2018-05-02 ENCOUNTER — OFFICE VISIT (OUTPATIENT)
Dept: PODIATRY | Facility: CLINIC | Age: 58
End: 2018-05-02
Payer: COMMERCIAL

## 2018-05-02 DIAGNOSIS — B07.0 VERRUCA PLANTARIS: Primary | ICD-10-CM

## 2018-05-02 PROCEDURE — 17110 DESTRUCTION B9 LES UP TO 14: CPT | Performed by: PODIATRIST

## 2018-05-02 NOTE — MR AVS SNAPSHOT
After Visit Summary   2018    Tremayne Galarza    MRN: [id number]           Patient Information     Date Of Birth          1960        Visit Information        Provider Department      2018 7:00 AM Zain Samuels DPM [facility]        Today's Diagnoses     Verruca plantaris    -  1      Care Instructions    Thanks for coming today.  Ortho/Sports Medicine Clinic  [address] [place] [address]    To schedule future appointments in Ortho Clinic, you may call [phone].    To schedule ordered imaging by your provider:   Call Central Imaging [phone]    To schedule an injection ordered by your provider:  Call Central Imaging Injection scheduling line: [phone]  Team Kral[employer] artshart available online at:  [email or web address]    Please call if any further questions or concerns ([phone]).  Clinic hours 8 am to 5 pm.    Return to clinic (call) if symptoms worsen or fail to improve.            Follow-ups after your visit        Your next 10 appointments already scheduled     Jul 10, 2018  8:00 AM CDT   Return Visit with Radha Orona MD   [facility]    [address] [place] [address] [phone]              Who to contact     If you have questions or need follow up information about today's clinic visit or your schedule please contact [facility] directly at [phone].  Normal or non-critical lab and imaging results will be communicated to you by MyChart, letter or phone within 4 business days after the clinic has received the results. If you do not hear from us within 7 days, please contact the clinic through MyChart or phone. If you have a critical or abnormal lab result, we will notify you by phone as soon as possible.  Submit refill requests through Vayyar or call your pharmacy and they will forward the refill request to us. Please allow 3 business days  for your refill to be completed.          Additional Information About Your Visit        pSiFlow Technologyhart Information     Pro V&V gives you secure access to your electronic health record. If you see a primary care provider, you can also send messages to your care team and make appointments. If you have questions, please call your primary care clinic.  If you do not have a primary care provider, please call [phone] and they will assist you.      Pro V&V is an electronic gateway that provides easy, online access to your medical records. With Pro V[employer]V, you can request a clinic appointment, read your test results, renew a prescription or communicate with your care team.     To access your existing account, please contact your [facility] or call [phone] for assistance.        Care EveryWhere ID     This is your Care EveryWhere ID. This could be used by other organizations to access your [facility] medical records  [id number]         Blood Pressure from Last 3 Encounters:   05/02/18 (P) 120/61   04/30/18 133/74   04/11/18 130/76    Weight from Last 3 Encounters:   04/30/18 91.4 kg (201 lb 6.4 oz)   03/27/18 89.8 kg (198 lb)   03/21/18 90.7 kg (200 lb)              We Performed the Following     DESTRUCT BENIGN LESION, UP TO 14        Primary Care Provider Office Phone # Fax #    Mee [place], SYLVIA [facility] [phone]       [address]  [place] [address]        Equal Access to Services     CAROL DAVIS : Hadii aad ku hadasho Sorellali, waaxda luqadaha, qaybta kaalmada adeegyada, isha munoz . So [facility] [phone].    ATENCIÓN: Si habla español, tiene a nj disposición servicios gratuitos de asistencia lingüística. Llame al [phone].    We comply with applicable federal civil rights laws and [place] laws. We do not discriminate on the basis of race, color, national origin, age, disability, sex, sexual orientation, or gender  identity.            Thank you!     Thank you for choosing [facility]  for your care. Our goal is always to provide you with excellent care. Hearing back from our patients is one way we can continue to improve our services. Please take a few minutes to complete the written survey that you may receive in the mail after your visit with us. Thank you!             Your Updated Medication List - Protect others around you: Learn how to safely use, store and throw away your medicines at www.disposemymeds.org.          This list is accurate as of 5/2/18  7:20 AM.  Always use your most recent med list.                   Brand Name Dispense Instructions for use Diagnosis    aspirin 325 MG tablet     90 tablet    Take 1 tablet (325 mg) by mouth daily    Coronary artery disease involving native coronary artery of native heart without angina pectoris       atenolol 25 MG tablet    TENORMIN    90 tablet    Take 1 tablet (25 mg) by mouth daily    Coronary artery disease involving native coronary artery of native heart without angina pectoris       fexofenadine 60 MG tablet    ALLEGRA    60 tablet    Take 0.5 tablets (30 mg) by mouth daily    Seasonal allergic rhinitis       rosuvastatin 40 MG tablet    CRESTOR    90 tablet    Take 1 tablet (40 mg) by mouth daily    Hypercholesteremia       sildenafil 50 MG tablet    VIAGRA     as needed for ED        Zinc Sulfate 220 (50 Zn) MG Tabs     60 tablet    Take 1 tablet by mouth 2 times daily    Verruca pedis

## 2018-05-02 NOTE — PROGRESS NOTES
Chief Complaint:   Chief Complaint   Patient presents with     RECHECK     Follow up for warts         No Known Allergies      Subjective: Tremayne is a 58 year old male who presents to the clinic today for a follow up of BL verruca. No pain to these areas. He has continued with the zinc and using the OTC padding.     Objective  Data Unavailable Data Unavailable Data Unavailable Data Unavailable Data Unavailable 0 lbs 0 oz  There were areas of blistering maceration noted to the right foot. I debrided these down and it appears that the skin tension lines have returned to all of the lesions except the right 5th met head. No pinpoint bleeding noted to the areas except the 5th met head.     Assessment: 1 plantar verruca. All others destroyed.     Plan:   - Pt seen and evaluated  - The right plantar 5th met head verruca was treated with LN after obtaining pt consent. Liquid nitrogen was applied for 10 seconds to the skin lesion and the expected blistering or scabbing reaction explained. Do not pick at the area. Patient reminded to expect hypopigmented scars from the procedure. Return if lesion fails to fully resolve.

## 2018-05-02 NOTE — LETTER
5/2/2018         RE: Tremayne Galarza  [address] [facility] [address]        Dear Colleague,    Thank you for referring your patient, Tremayne Galarza, to the [facility]. Please see a copy of my visit note below.    Chief Complaint:   Chief Complaint   Patient presents with     RECHECK     Follow up for warts         No Known Allergies      Subjective: Tremayne is a 58 year old male who presents to the clinic today for a follow up of BL verruca. No pain to these areas. He has continued with the zinc and using the OTC padding.     Objective  Data Unavailable Data Unavailable Data Unavailable Data Unavailable Data Unavailable 0 lbs 0 oz  There were areas of blistering maceration noted to the right foot. I debrided these down and it appears that the skin tension lines have returned to all of the lesions except the right 5th met head. No pinpoint bleeding noted to the areas except the 5th met head.     Assessment: 1 plantar verruca. All others destroyed.     Plan:   - Pt seen and evaluated  - The right plantar 5th met head verruca was treated with LN after obtaining pt consent. Liquid nitrogen was applied for 10 seconds to the skin lesion and the expected blistering or scabbing reaction explained. Do not pick at the area. Patient reminded to expect hypopigmented scars from the procedure. Return if lesion fails to fully resolve.        Again, thank you for allowing me to participate in the care of your patient.        Sincerely,        Zain Samuels DPM

## 2018-05-02 NOTE — PATIENT INSTRUCTIONS
Thanks for coming today.  Ortho/Sports Medicine Clinic  [address] [place] [address]    To schedule future appointments in Ortho Clinic, you may call [phone].    To schedule ordered imaging by your provider:   Call Central Imaging [phone]    To schedule an injection ordered by your provider:  Call Central Imaging Injection scheduling line: [phone]  TALON THERAPEUTICShart available online at:  [email or web address]    Please call if any further questions or concerns ([phone]).  Clinic hours 8 am to 5 pm.    Return to clinic (call) if symptoms worsen or fail to improve.

## 2018-05-02 NOTE — NURSING NOTE
"Tremayne Galarza's goals for this visit include: F/U warts on feet     He requests these members of his care team be copied on today's visit information: Yes    PCP: Mee Park    Referring Provider:  No referring provider defined for this encounter.    Chief Complaint   Patient presents with     RECHECK     Follow up for warts        Initial There were no vitals taken for this visit. Estimated body mass index is 28.29 kg/(m^2) as calculated from the following:    Height as of 3/21/18: 1.797 m (5' 10.75\").    Weight as of 4/30/18: 91.4 kg (201 lb 6.4 oz).  Medication Reconciliation: complete   Beatriz Vasquez CMA      "

## 2018-05-07 DIAGNOSIS — I25.10 CORONARY ARTERY DISEASE INVOLVING NATIVE CORONARY ARTERY OF NATIVE HEART WITHOUT ANGINA PECTORIS: ICD-10-CM

## 2018-05-08 RX ORDER — ATENOLOL 25 MG/1
TABLET ORAL
Qty: 30 TABLET | Refills: 4 | Status: SHIPPED | OUTPATIENT
Start: 2018-05-08 | End: 2018-09-26

## 2018-05-08 NOTE — TELEPHONE ENCOUNTER
atenolol (TENORMIN) 25 MG tablet 90 tablet 2 8/18/2017  No   Sig: Take 1 tablet (25 mg) by mouth daily     Last OV with cardiology: 4/30/2018    Next 5 appointments (look out 90 days)     Jul 10, 2018  8:00 AM CDT   Return Visit with Radha [facility]rona MD   [facility]    [address] [place] [address] [phone]                BP Readings from Last 3 Encounters:   05/02/18 (P) 120/61   04/30/18 133/74   04/11/18 130/76     Beta-Blockers Protocol Passed5/7 11:02 AM   Blood pressure under 140/90 in past 12 months    Patient is age 6 or older    Recent (12 mo) or future (30 days) visit within the authorizing provider's specialty     Deanne Banda RN

## 2018-07-02 DIAGNOSIS — E78.00 HYPERCHOLESTEREMIA: ICD-10-CM

## 2018-07-03 RX ORDER — ATORVASTATIN CALCIUM 80 MG/1
TABLET, FILM COATED ORAL
Qty: 30 TABLET | Refills: 6 | OUTPATIENT
Start: 2018-07-03

## 2018-07-03 NOTE — TELEPHONE ENCOUNTER
atorvastatin (LIPITOR) 80 MG tablet (Discontinued) 30 tablet 6 3/15/2018 4/30/2018 No   Sig: TAKE ONE TABLET BY MOUTH EVERY DAY   Class: E-Prescribe   Reason for Discontinue: Alternate therapy       Statins Protocol Passed7/2 7:00 PM   LDL on file in past 12 months    No abnormal creatine kinase in past 12 months    Recent (12 mo) or future (30 days) visit within the authorizing provider's specialty    Patient is age 18 or older     Deanne Banda RN

## 2018-07-10 ENCOUNTER — OFFICE VISIT (OUTPATIENT)
Dept: DERMATOLOGY | Facility: CLINIC | Age: 58
End: 2018-07-10
Payer: COMMERCIAL

## 2018-07-10 DIAGNOSIS — L57.0 AK (ACTINIC KERATOSIS): ICD-10-CM

## 2018-07-10 DIAGNOSIS — Z85.828 HISTORY OF NONMELANOMA SKIN CANCER: Primary | ICD-10-CM

## 2018-07-10 PROCEDURE — 99213 OFFICE O/P EST LOW 20 MIN: CPT | Mod: 25 | Performed by: DERMATOLOGY

## 2018-07-10 PROCEDURE — 17003 DESTRUCT PREMALG LES 2-14: CPT | Performed by: DERMATOLOGY

## 2018-07-10 PROCEDURE — 17000 DESTRUCT PREMALG LESION: CPT | Performed by: DERMATOLOGY

## 2018-07-10 ASSESSMENT — PAIN SCALES - GENERAL: PAINLEVEL: NO PAIN (0)

## 2018-07-10 NOTE — PATIENT INSTRUCTIONS
CRYOTHERAPY    What is it?    Use of a very cold liquid, such as liquid nitrogen, to freeze and destroy abnormal skin cells that need to be removed    What should I expect?    Tenderness and redness    A small blister that might grow and fill with dark purple blood. There may be crusting.    More than one treatment may be needed if the lesions do not go away.    How do I care for the treated area?    Gently wash the area with your hands when bathing.    Use a thin layer of Vaselin to help with healing. You may use a Band-Aid.     The area should heal within 7-10 days.    Do not use an antibiotic or Neosporin ointment.     You may take acetaminophen (Tylenol) for pain.     Call your Doctor if you have:    Severe pain    Signs of infection (warmth, redness, cloudy yellow drainage, and or a bad smell)    Questions or concerns    Contact infromation:  [facility] [phone]  [facility] [phone]

## 2018-07-10 NOTE — MR AVS SNAPSHOT
After Visit Summary   7/10/2018    Tremayne Galarza    MRN: [id number]           Patient Information     Date Of Birth          1960        Visit Information        Provider Department      7/10/2018 8:00 AM Radha Orona MD [facility]        Today's Diagnoses     History of nonmelanoma skin cancer    -  1    AK (actinic keratosis)          Care Instructions    CRYOTHERAPY    What is it?    Use of a very cold liquid, such as liquid nitrogen, to freeze and destroy abnormal skin cells that need to be removed    What should I expect?    Tenderness and redness    A small blister that might grow and fill with dark purple blood. There may be crusting.    More than one treatment may be needed if the lesions do not go away.    How do I care for the treated area?    Gently wash the area with your hands when bathing.    Use a thin layer of Vaselin to help with healing. You may use a Band-Aid.     The area should heal within 7-10 days.    Do not use an antibiotic or Neosporin ointment.     You may take acetaminophen (Tylenol) for pain.     Call your Doctor if you have:    Severe pain    Signs of infection (warmth, redness, cloudy yellow drainage, and or a bad smell)    Questions or concerns    Contact infromation:  [facility] [phone]  [facility] [phone]          Follow-ups after your visit        Follow-up notes from your care team     Return in about 1 year (around 7/10/2019) for Skin Check - Hx of NMSC.      Who to contact     If you have questions or need follow up information about today's clinic visit or your schedule please contact [facility] directly at [phone].  Normal or non-critical lab and imaging results will be communicated to you by MyChart, letter or phone within 4 business days after the clinic has received the results. If you do not hear from us within 7 days, please contact the  clinic through Citizinvestor or phone. If you have a critical or abnormal lab result, we will notify you by phone as soon as possible.  Submit refill requests through Citizinvestor or call your pharmacy and they will forward the refill request to us. Please allow 3 business days for your refill to be completed.          Additional Information About Your Visit        [employer] Information     Citiz[employer]or gives you secure access to your electronic health record. If you see a primary care provider, you can also send messages to your care team and make appointments. If you have questions, please call your primary care clinic.  If you do not have a primary care provider, please call [phone] and they will assist you.      Citiz[employer]or is an electronic gateway that provides easy, online access to your medical records. With [employer]iz[employer]or, you can request a clinic appointment, read your test results, renew a prescription or communicate with your care team.     To access your existing account, please contact your [facility] or call [phone] for assistance.        Care EveryWhere ID     This is your Care EveryWhere ID. This could be used by other organizations to access your [facility] medical records  [id number]         Blood Pressure from Last 3 Encounters:   05/02/18 (P) 120/61   04/30/18 133/74   04/11/18 130/76    Weight from Last 3 Encounters:   04/30/18 91.4 kg (201 lb 6.4 oz)   03/27/18 89.8 kg (198 lb)   03/21/18 90.7 kg (200 lb)              We Performed the Following     DESTRUCT PREMALIGNANT LESION, 2-14     DESTRUCT PREMALIGNANT LESION, FIRST        Primary Care Provider Office Phone # Fax #    Mee SYLVIA [facility] [phone]       [address]  [place] [address]        Equal Access to Services     CAROL DAVIS : Corey Larkin, mamie petersen, alejandro castaneda, isha luther. So [facility] [phone].    ATENCIÓN:  Si habla barry, tiene a nj disposición servicios gratuitos de asistencia lingüística. Doyle collazo [phone].    We comply with applicable federal civil rights laws and [place] laws. We do not discriminate on the basis of race, color, national origin, age, disability, sex, sexual orientation, or gender identity.            Thank you!     Thank you for choosing [facility]  for your care. Our goal is always to provide you with excellent care. Hearing back from our patients is one way we can continue to improve our services. Please take a few minutes to complete the written survey that you may receive in the mail after your visit with us. Thank you!             Your Updated Medication List - Protect others around you: Learn how to safely use, store and throw away your medicines at www.disposemymeds.org.          This list is accurate as of 7/10/18  8:23 AM.  Always use your most recent med list.                   Brand Name Dispense Instructions for use Diagnosis    aspirin 325 MG tablet     90 tablet    Take 1 tablet (325 mg) by mouth daily    Coronary artery disease involving native coronary artery of native heart without angina pectoris       atenolol 25 MG tablet    TENORMIN    30 tablet    TAKE ONE TABLET BY MOUTH EVERY DAY    Coronary artery disease involving native coronary artery of native heart without angina pectoris       fexofenadine 60 MG tablet    ALLEGRA    60 tablet    Take 0.5 tablets (30 mg) by mouth daily    Seasonal allergic rhinitis       rosuvastatin 40 MG tablet    CRESTOR    90 tablet    Take 1 tablet (40 mg) by mouth daily    Hypercholesteremia       sildenafil 50 MG tablet    VIAGRA     as needed for ED        Zinc Sulfate 220 (50 Zn) MG Tabs     60 tablet    Take 1 tablet by mouth 2 times daily    Verruca pedis

## 2018-07-10 NOTE — NURSING NOTE
Dermatology Rooming Note    Tremayne Galarza's goals for this visit include:   Chief Complaint   Patient presents with     Skin Check     no areas of concern hx NMSC        Is a scribe okay for this visit:Yes    Are records needed for this visit(If yes, obtain release of information): No,      Vitals: There were no vitals taken for this visit.    Referring Provider:  No referring provider defined for this encounter.    Kandace Arevalo LPN

## 2018-07-10 NOTE — PROGRESS NOTES
[facility] Dermatology Note      Dermatology Problem List:  1. NMSC  -BCC, upper chest, s/p ED&C 8/4/2016  -BCC, base of neck, s/p excision 8/4/2016  2. Compound nevus with severe dysplasia, left chest  -s/p excision 1/15/2016  3. Eczema, left shin  -Previous Tx:  triamcinolone (initiated 1/4/2016)    Encounter Date: Jul 10, 2018    CC:  Chief Complaint   Patient presents with     Skin Check     no areas of concern hx NMSC          History of Present Illness:  Mr. Tremayne Galarza is a 58 year old male who presents as a follow-up for history of NMSC. The patient was last seen 7/11/2017 when the patient had no areas of concern.  Nothing bleeding, crusting, or changing. The patient has no areas of concern today.      Past Medical History:   Patient Active Problem List   Diagnosis     Chronic ischemic heart disease     Benign neoplasm of colon     Hyperlipidemia with target LDL less than 100     HTN, goal below 140/90     Lesion of plantar nerve     Obesity     Allergic rhinitis     Postsurgical percutaneous transluminal coronary angioplasty status     Nondependent alcohol abuse     Status post coronary angiogram     CAD (coronary artery disease)     LUQ abdominal pain     Verruca plantaris     Past Medical History:   Diagnosis Date     Chronic ischemic heart disease 9/4/2014    Overview:  3 stents to RCA 8/2014      Essential hypertension 2/11/2005    Overview:  LW Onset:  59Ynh32      Hyperlipidemia 11/14/2005    Overview:  LW Onset:  14Nov05      Past Surgical History:   Procedure Laterality Date     CARDIAC SURGERY       COLONOSCOPY N/A 12/30/2015    Procedure: COLONOSCOPY;  Surgeon: Duane, William Charles, MD;  Location: [facility] OR     COLONOSCOPY WITH CO2 INSUFFLATION N/A 12/30/2015    Procedure: COLONOSCOPY WITH CO2 INSUFFLATION;  Surgeon: Duane, William Charles, MD;  Location:  OR       Social History:  The patient works as a IT Executive. The patient denies use of tanning beds. The patient has  3-6 alcoholic drinks per week, does not smoke or use tobacco. The patient has 3 grandchildren.      Family History:  There is no family history of skin cancer.  There is a family history of cardiovascular disease.       Medications:  Current Outpatient Prescriptions   Medication Sig Dispense Refill     aspirin 325 MG tablet Take 1 tablet (325 mg) by mouth daily 90 tablet 3     atenolol (TENORMIN) 25 MG tablet TAKE ONE TABLET BY MOUTH EVERY DAY 30 tablet 4     fexofenadine (ALLEGRA) 60 MG tablet Take 0.5 tablets (30 mg) by mouth daily 60 tablet 3     rosuvastatin (CRESTOR) 40 MG tablet Take 1 tablet (40 mg) by mouth daily 90 tablet 3     sildenafil (VIAGRA) 50 MG tablet as needed for ED       Zinc Sulfate 220 (50 ZN) MG TABS Take 1 tablet by mouth 2 times daily 60 tablet 0       No Known Allergies    Review of Systems:  -Constitutional: The patient is feeling generally well. No fevers, chills, night sweats. No recent new medications or hospitalizations. He fractured his pelvis and arm this summer.   -Skin: As above in HPI. No additional skin concerns.    Physical exam:  Vitals: There were no vitals taken for this visit.  GEN: This is a well developed, well-nourished male in no acute distress, in a pleasant mood.    SKIN: Total skin excluding the undergarment areas was performed. The exam included the head/face, neck, both arms, chest, back, abdomen, both legs, digits and/or nails. Including buttocks. Declines genital skin exam.   -There is no erythema, telangectasias, nodularity, or pigmentation on the sites of prior skin cancer.  -There are erythematous macules with overyling adherent scale on the right cheek, right temple, left temple.   -No other lesions of concern on areas examined.       Impression/Plan:  1. History of NMSC: no evidence of recurrence     Sun precaution was advised including the use of sun screens of SPF 30 or higher, sun protective clothing, and avoidance of tanning beds.    2. History of  dysplastic nevus: no evidence of recurrence   3. Actinic keratosis: right cheek, right temple, left temple     Cryotherapy procedure note: After verbal consent and discussion of risks and benefits including but no limited to dyspigmentation/scar, blister, and pain, 6 was(were) treated with 1-2mm freeze border for 2 cycles with liquid nitrogen. Post cryotherapy instructions were provided.       Follow-up in 1 year, earlier for new or changing lesions.       Staff Involved:  Scribe/Staff    Scribe Disclosure:   I, Carla Whitten, am serving as a scribe to document services personally performed by Dr. Radha Orona, based on data collection and the provider's statements to me.         Provider Disclosure:   The documentation recorded by the scribe accurately reflects the services I personally performed and the decisions made by me.    Radha Orona MD    Department of Dermatology  [facility]: Phone: [phone], Fax:[phone]  [facility]: Phone: [phone], Fax: [phone]

## 2018-08-17 ENCOUNTER — ALLIED HEALTH/NURSE VISIT (OUTPATIENT)
Dept: PEDIATRICS | Facility: CLINIC | Age: 58
End: 2018-08-17
Payer: COMMERCIAL

## 2018-08-17 VITALS — SYSTOLIC BLOOD PRESSURE: 139 MMHG | DIASTOLIC BLOOD PRESSURE: 81 MMHG | HEART RATE: 50 BPM

## 2018-08-17 DIAGNOSIS — I10 HTN, GOAL BELOW 140/90: Primary | ICD-10-CM

## 2018-08-17 PROCEDURE — 99207 ZZC NO CHARGE NURSE ONLY: CPT | Performed by: NURSE PRACTITIONER

## 2018-08-17 NOTE — PROGRESS NOTES
Tremayne Galarza is enrolled/participating in the retail pharmacy Blood Pressure Goals Achievement Program (BPGAP).  Tremayne Galarza was evaluated at [facility] on August 17, 2018 at which time his blood pressure was:    BP Readings from Last 3 Encounters:   08/17/18 139/81   05/02/18 (P) 120/61   04/30/18 133/74     Reviewed lifestyle modifications for blood pressure control and reduction: including making healthy food choices, managing weight, getting regular exercise, smoking cessation, reducing alcohol consumption, monitoring blood pressure regularly.     Tremayne Galarza is not experiencing symptoms.    Follow-Up: BP is at goal of < 140/90mmHg (patient 18+ years of age with or without diabetes).  Recommended follow-up at pharmacy in 6 months.     Recommendation to Provider: none    Tremayne Galarza was evaluated for enrollment into the PGEN study today.    Patient eligible for enrollment:  No  Patient interested in enrollment:  No    Completed by: Mo[facility] Pharm. D.  [facility] Pharmacy Manager  [phone]  8/17/2018

## 2018-08-17 NOTE — MR AVS SNAPSHOT
After Visit Summary   8/17/2018    Tremayne Galarza    MRN: [id number]           Patient Information     Date Of Birth          1960        Visit Information        Provider Department      8/17/2018 4:21 PM Mee Park APRN CNP [facility]        Today's Diagnoses     HTN, goal below 140/90    -  1       Follow-ups after your visit        Follow-up notes from your care team     Return in about 6 months (around 2/17/2019).      Who to contact     If you have questions or need follow up information about today's clinic visit or your schedule please contact [facility] directly at [phone].  Normal or non-critical lab and imaging results will be communicated to you by TxtFeedbackhart, letter or phone within 4 business days after the clinic has received the results. If you do not hear from us within 7 days, please contact the clinic through TxtFeedbackhart or phone. If you have a critical or abnormal lab result, we will notify you by phone as soon as possible.  Submit refill requests through Friendly Wager App or call your pharmacy and they will forward the refill request to us. Please allow 3 business days for your refill to be completed.          Additional Information About Your Visit        MyChart Information     Friendly Wager App gives you secure access to your electronic health record. If you see a primary care provider, you can also send messages to your care team and make appointments. If you have questions, please call your primary care clinic.  If you do not have a primary care provider, please call [phone] and they will assist you.      Friendly Wager App is an electronic gateway that provides easy, online access to your medical records. With Friendly Wager App, you can request a clinic appointment, read your test results, renew a prescription or communicate with your care team.     To access your existing account, please contact your [facility] or call [phone]  for assistance.        Care EveryWhere ID     This is your Care EveryWhere ID. This could be used by other organizations to access your [facility] medical records  [id number]        Your Vitals Were     Pulse                   50            Blood Pressure from Last 3 Encounters:   08/17/18 139/81   05/02/18 (P) 120/61   04/30/18 133/74    Weight from Last 3 Encounters:   04/30/18 201 lb 6.4 oz (91.4 kg)   03/27/18 198 lb (89.8 kg)   03/21/18 200 lb (90.7 kg)              Today, you had the following     No orders found for display       Primary Care Provider Office Phone # Fax #    Mee Park, APRN [facility] [phone]       [address]  [place] [address]        Equal Access to Services     [facility] : Hadii aad ku hadasho Soomaali, waaxda luqadaha, qaybta kaalmada adeegyada, isha trent hayjim munoz . So [facility] [phone].    ATENCIÓN: Si habla español, tiene a nj disposición servicios gratuitos de asistencia lingüística. Doyle al [phone].    We comply with applicable federal civil rights laws and [place] laws. We do not discriminate on the basis of race, color, national origin, age, disability, sex, sexual orientation, or gender identity.            Thank you!     Thank you for choosing [facility]  for your care. Our goal is always to provide you with excellent care. Hearing back from our patients is one way we can continue to improve our services. Please take a few minutes to complete the written survey that you may receive in the mail after your visit with us. Thank you!             Your Updated Medication List - Protect others around you: Learn how to safely use, store and throw away your medicines at www.disposemymeds.org.          This list is accurate as of 8/17/18  4:23 PM.  Always use your most recent med list.                   Brand Name Dispense Instructions for use Diagnosis    aspirin 325 MG tablet     90 tablet    Take 1  tablet (325 mg) by mouth daily    Coronary artery disease involving native coronary artery of native heart without angina pectoris       atenolol 25 MG tablet    TENORMIN    30 tablet    TAKE ONE TABLET BY MOUTH EVERY DAY    Coronary artery disease involving native coronary artery of native heart without angina pectoris       fexofenadine 60 MG tablet    ALLEGRA    60 tablet    Take 0.5 tablets (30 mg) by mouth daily    Seasonal allergic rhinitis       rosuvastatin 40 MG tablet    CRESTOR    90 tablet    Take 1 tablet (40 mg) by mouth daily    Hypercholesteremia       sildenafil 50 MG tablet    VIAGRA     as needed for ED        Zinc Sulfate 220 (50 Zn) MG Tabs     60 tablet    Take 1 tablet by mouth 2 times daily    Verruca pedis          no

## 2018-09-26 DIAGNOSIS — I25.10 CORONARY ARTERY DISEASE INVOLVING NATIVE CORONARY ARTERY OF NATIVE HEART WITHOUT ANGINA PECTORIS: ICD-10-CM

## 2018-09-26 RX ORDER — ATENOLOL 25 MG/1
TABLET ORAL
Qty: 30 TABLET | Refills: 4 | Status: SHIPPED | OUTPATIENT
Start: 2018-09-26 | End: 2019-03-06

## 2019-03-06 DIAGNOSIS — I25.10 CORONARY ARTERY DISEASE INVOLVING NATIVE CORONARY ARTERY OF NATIVE HEART WITHOUT ANGINA PECTORIS: ICD-10-CM

## 2019-03-07 RX ORDER — ATENOLOL 25 MG/1
TABLET ORAL
Qty: 30 TABLET | Refills: 4 | Status: SHIPPED | OUTPATIENT
Start: 2019-03-07 | End: 2019-08-06

## 2019-04-01 ENCOUNTER — OFFICE VISIT (OUTPATIENT)
Dept: CARDIOLOGY | Facility: CLINIC | Age: 59
End: 2019-04-01
Payer: COMMERCIAL

## 2019-04-01 VITALS
OXYGEN SATURATION: 97 % | BODY MASS INDEX: 30.2 KG/M2 | WEIGHT: 215 LBS | SYSTOLIC BLOOD PRESSURE: 123 MMHG | DIASTOLIC BLOOD PRESSURE: 76 MMHG | HEART RATE: 58 BPM

## 2019-04-01 DIAGNOSIS — E78.00 HYPERCHOLESTEREMIA: Primary | ICD-10-CM

## 2019-04-01 DIAGNOSIS — E78.00 HYPERCHOLESTEREMIA: ICD-10-CM

## 2019-04-01 LAB
CHOLEST SERPL-MCNC: 174 MG/DL
HDLC SERPL-MCNC: 54 MG/DL
LDLC SERPL CALC-MCNC: 100 MG/DL
NONHDLC SERPL-MCNC: 120 MG/DL
TRIGL SERPL-MCNC: 101 MG/DL

## 2019-04-01 PROCEDURE — 99214 OFFICE O/P EST MOD 30 MIN: CPT | Performed by: INTERNAL MEDICINE

## 2019-04-01 PROCEDURE — 80061 LIPID PANEL: CPT | Performed by: INTERNAL MEDICINE

## 2019-04-01 PROCEDURE — 36415 COLL VENOUS BLD VENIPUNCTURE: CPT | Performed by: INTERNAL MEDICINE

## 2019-04-01 RX ORDER — EZETIMIBE 10 MG/1
10 TABLET ORAL DAILY
Qty: 90 TABLET | Refills: 3 | Status: SHIPPED | OUTPATIENT
Start: 2019-04-01 | End: 2020-03-16

## 2019-04-01 ASSESSMENT — PAIN SCALES - GENERAL: PAINLEVEL: NO PAIN (0)

## 2019-04-01 NOTE — PATIENT INSTRUCTIONS
It was a pleasure to see you in the cardiology clinic today.    If you have any questions, you can reach my nurse, Morena Perez, at [phone].     Note the new medications: Zetia 10 mg, once a day    Stop the following medications: None    The results from today include: None    Tests ordered today:     Recent Labs   Lab Test 04/01/19  0735 04/30/18  0713  02/06/15  0735 09/23/14   CHOL 174 169   < > 146 155  155  155   HDL 54 54   < > 44 42*  42  42   * 80   < > 80 85  85  85   TRIG 101 175*   < > 110 139  139  139   CHOLHDLRATIO  --   --   --  3.3 3.7  3.7  3.7    < > = values in this interval not displayed.     Check BP for next 2 weeks.  If it is increased over 140/90 mm Hg while taking the Atenolol, I will add a second medication.  Meanwhile, reduce your salt intake including added salt at table and processed foods (i.e. canned foods in particular).    I would like you to follow up with Dr. West in 1 year.    Sincerely,      Tomás West MD     [facility]

## 2019-04-01 NOTE — PROGRESS NOTES
"CARDIOLOGY CLINIC FOLLOW UP    HPI: Tremayne Galarza is a 59 year old male referred by Mee Park, for ongoing evaluation of CAD.  The patient's risk factor profile is: (+) HTN, (-) diabetes, (+) hyperlipidemia, (-) tobacco use, (+) family Hx CAD [maternal Hx CAD at age 38].  He has no documented history of arrhythmia, or valvular heart disease.  The patient has a history of CAD dating back to Aug 2014 when he presented to [facility] with a \"tingling sensation in his lungs\" in the absence of chest discomfort.  He was actively working out at [employer] and noticed the abnormal breathing sensation during that strenuous activity.  He had a bicycle ECHO and his LVEF failed to augment (50% --> 45%) and he had anterior-apical hypokinesis at peak exercise.  On the ECG there was a maximum of 1.0-2.0 mm of upsloping ST depression in leads II, V4, V5, V6.   A maximum of 1.0-2.0 mm. of ST elevation was present in leads avR.  He had coronary angiography on 8/27/14.  The report documents mild nonobstructive CAD in the LAD and LCx.  The RCA had subtotal stenosis in the proximal segment (stented with 3.5x24 mm JOSE) and diffuse narrowing in the mid-distal RCA up to 90% (stented with 2 overlapping JOSE, 3.5x12 and 2.75x20).  The patient had cardiac rehabilitation post coronary angiography and PCI.   I first evaluated him in March 2015 and reviewed his coronary angiogram from [facility].  There was a 60-70% stenosis in the LAD and I was concerned enough to bring him back to the cath lab and check an FFR.  The FFR was 0.78 and the stents in the RCA were widely patent.  He continued on medical therapy.  It has been a year since his last visit.  He has not had subsequent chest discomfort, SOB (including the tingling sensation he originally described), PND, orthopnea, pedal edema, palpitations, lightheadedness, and syncope.  He is active, working at [employer] 6 times a week.  He is not experiencing any " cardiopulmonary symptoms during exercise.  The patient has no history of cerebrovascular disease or PAD.    BPs in ambulatory setting well controlled.    The patient has not used SL NTG and has Rx for Viagra.  He knows not to use them within 24 hours of one another.    He remains on ASA.  However, he has not had any bleeding symptoms.       PAST MEDICAL HISTORY:  Past Medical History:   Diagnosis Date     Chronic ischemic heart disease 9/4/2014    Overview:  3 stents to RCA 8/2014      Essential hypertension 2/11/2005    Overview:  LW Onset:  55Whi98      Hyperlipidemia 11/14/2005    Overview:  LW Onset:  97Wzv70        CURRENT MEDICATIONS:  Current Outpatient Medications   Medication Sig Dispense Refill     aspirin 325 MG tablet Take 1 tablet (325 mg) by mouth daily 90 tablet 3     atenolol (TENORMIN) 25 MG tablet TAKE ONE TABLET BY MOUTH EVERY DAY 30 tablet 4     fexofenadine (ALLEGRA) 60 MG tablet Take 0.5 tablets (30 mg) by mouth daily 60 tablet 3     rosuvastatin (CRESTOR) 40 MG tablet Take 1 tablet (40 mg) by mouth daily 90 tablet 3     sildenafil (VIAGRA) 50 MG tablet as needed for ED       Zinc Sulfate 220 (50 ZN) MG TABS Take 1 tablet by mouth 2 times daily 60 tablet 0       PAST SURGICAL HISTORY:  Past Surgical History:   Procedure Laterality Date     CARDIAC SURGERY       COLONOSCOPY N/A 12/30/2015    Procedure: COLONOSCOPY;  Surgeon: Duane, William Charles, MD;  Location: [facility] OR     COLONOSCOPY WITH CO2 INSUFFLATION N/A 12/30/2015    Procedure: COLONOSCOPY WITH CO2 INSUFFLATION;  Surgeon: Duane, William Charles, MD;  Location: [facility] OR       ALLERGIES  Patient has no known allergies.    FAMILY HX:  No family history on file.    SOCIAL HX:  Social History     Social History     Marital Status:      Spouse Name: N/A     Number of Children: N/A     Years of Education: N/A     Social History Main Topics     Smoking status: Never Smoker      Smokeless tobacco: Never Used     Alcohol Use: Yes     Drug Use:  No     Sexual Activity:     Partners: Female     Other Topics Concern     None     Social History Narrative       ROS:  Constitutional: No fever, chills, or sweats. No weight gain/loss.   HEENT: No visual disturbance, ear ache, epistaxis, sore throat.   Allergies/Immunologic: Negative.   Respiratory: No cough, hemoptysis.   Cardiovascular: As per HPI.   GI: No nausea, vomiting, hematemesis, melena, or hematochezia.   : No urinary frequency, dysuria, or hematuria.   Integument: No rash.   Psychiatric: No anxiety / depression.   Neuro: No speech disturbance, focal sensory or motor deficit.   Endocrinology: No polyuria / polyphagia.   Musculoskeletal: No myalgia.    VITAL SIGNS:  /89 (BP Location: Left arm, Patient Position: Sitting, Cuff Size: Adult Large)   Pulse 58   Wt 97.5 kg (215 lb)   SpO2 96%   BMI 30.20 kg/m    Body mass index is 30.2 kg/m .  Wt Readings from Last 2 Encounters:   18 91.4 kg (201 lb 6.4 oz)   18 89.8 kg (198 lb)       PHYSICAL EXAM  Tremayne Galarza is a 59 year old male.in no acute distress.  HEENT: Eyes Nonicteric.  Neck: JVP normal.  Carotids +3/3 bilaterally without bruits.  Lungs: CTA.  Cor: RRR. Normal S1 and S2.  No murmur, rub, or gallop.  PMI in Lf 5th ICS.  Abd: Soft, nontender, nondistended.  NABS.  No pulsatile mass.  Extremities: No C/C/E.  Pulses +3/3 symmetric in upper and lower extremities.  Neuro: Grossly intact.  Psych: A&O x 3.  Skin: No rash.    LABS  Recent Labs   Lab Test 19  0735 18  0713  02/06/15  0735 14   CHOL 174 169   < > 146 155  155  155   HDL 54 54   < > 44 42*  42  42   * 80   < > 80 85  85  85   TRIG 101 175*   < > 110 139  139  139   CHOLHDLRATIO  --   --   --  3.3 3.7  3.7  3.7    < > = values in this interval not displayed.         EK/5/15  SB at 49.  Normal intervals and axes.  No ST shift, TWI, or Q waves.  Isolated PVC.      STRESS ECHO: 14  CONCLUSIONS  REST:  LV chamber size, wall  thickness, and segmental and global wall motion are normal. No significant valvular abnormalities are seen.  The visually estimated resting LVEF is 60 %.    STRESS:  There is lack of augmentation of LVEF during peak exercise.  Anterior-apical hypokinesis is suggested.    CONCLUSIONS:  Abnormal exercise echocardiogram with adequate heart rate and workload.  There is lack of augmentation of LVEF during peak exercise.  Anterior-apical hypokinesis is suggested.  Risk stratification by stress echocardiography is identified as intermediate to high risk.    REST ECG: Mild nonspecific ST-T segments.  Resting HR:62 bpmResting BP:122/76 mmHg  Pre-Stress Physical Exam  Current patient medications that may affect electrocardiographic  interpretation.: Atenolol, held for greater than 48 hours.    STRESS  Stress Type: Bike Ergometer  Peak HR: 146 bpm HR Response: Normal  Peak BP: 210/99 mmHg BP Response: Abnormal  Max Predicted HR: 168 bpm HR BP Product: 40701  % of Max Predicted HR: 87 Max Exercise: 9 METS  Test Duration: 14.3 min  Reason for Termination: Fatigue Exercise Effort: Good  Risk Stratification: Intermediate risk    RESULTS  Global LVEF (rest): Normal (LVEF >50%)  Global LVEF (stress): Mildly depressed (LVEF 40-50%)    ECG: A maximum of 1.0-2.0 mm of upsloping ST depression was present in leads II, V4, V5, V6.   A maximum of 1.0-2.0 mm. of ST elevation was present in leads avR.  Q waves were NOT present in leads with ST elevation.    SYMPTOMS  Chest pain was reproduced and did NOT require termination of the test. Pt complained of 3/10 chest burning with peak exercise.  Protocol completed. Predicted heart rate achieved. Hypertensive response to exercise. Shortness of breath.  Musculoskeletal.  Fatigue.    CARDIAC CATH: 8/27/14  The coronary circulation is right dominant.  Left main coronary artery   Left main: Normal.  Left anterior descending artery and branches  LAD: Normal. Moderate calcification, nonobstructive  disease.  Left circumflex artery and branches  Circumflex: Moderate calcification, nonobstructive disease.  Right coronary artery and branches  RCA: This vessel is subtotally occluded in the proximal portion. There is a long segment of disease in the mid/distal portion with JOE 2 flow. Both of these lesions were successfully stented with an everolimus eluting  stents. Proximal RCA: Mid RCA:    Interventions  LESION #1 INTERVENTION: A successful drug-eluting stent was performed on the 99 % lesion in the proximal RCA. Following intervention there was an excellent angiographic appearance with a 0 % residual stenosis. There was JOE 3 flow after the  Procedure.   Balloon angioplasty was performed, using a 3.0 x 15 mm Emerge MR balloon, with 3 inflations and a maximum inflation pressure of 14 juan pablo.   A 3.5 x 24 Premier Promus Stent everolimus-eluting stent was placed across the lesion and deployed at a maximum inflation pressure of 11 juan pablo.    LESION #2 INTERVENTION A successful drug-eluting stent was performed on the 90 % lesion in the mid RCA. Following intervention there was an excellent angiographic appearance with a 0 % residual stenosis.  Balloon angioplasty was performed, using a 3.0 x 15 mm Emerge MR balloon, with 1 inflations and a maximum inflation pressure of 6 juan pablo. Balloon angioplasty was performed, using a 2.75 x 20 mm Emerge MR balloon, with 4 inflations and a maximum inflation pressure of 14 juan pablo. A 2.75 x 38 premier Promus Stent everolimus-eluting stent was placed across the lesion and deployed at a maximum inflation pressure of 18 juan pablo.  A 3.5 x 12 Premier Promus Stent everolimus-eluting stent was placed across the lesion and deployed at a maximum inflation pressure of 11 juan pablo.    Coronary Angiogram (3/26/15):  Coronary angiography was performed via the Rt RADIAL artery using a 5 Fr Shiraz catheter and a 6 Fr Ikari 3.5 GC.  The LAD and LCx had separate ostia.  Both coronary vessels had moderate calcification  in the proximal segment.  The LAD had 30-40% narrowing proximally and a long 60% stenosis in the mid segment.  There was 25% narrowing at the takeoff of D3.  There were three diagonal branches, all moderate in caliber.  D1 had minimal disease.  D2 had 50% ostial narrowing.  D3 had minimal disease.  The LCx gave rise to 2 OM branches.  There was mild atherosclerosis (25%) disease in the LCx that was diffuse in nature.  The OM branches had mild disease. The RCA had a stent in the proximal segment and two overlapping stents spanning the mid-distal segment.  There was mild ISR (10%) in the stented segment.  There was 30-40% disease between the two stents.  There was minimal disease in the Rt PDA and Rt PL segments.    Physiologic assessment was performed on the LAD with the Radiwire positioned at the apex of the LAD.  The FFR of LAD was 0.78 at peak hyperemia.    Impression:    1. Single vessel CAD, angiographically borderline 60-70% long stenosis in mid LAD  2. Physiologically borderline stenosis in mid LAD    ASSESSMENT AND PLAN:    1. CAD.  Mr. Galarza has two vessel CAD and is now 29 months post PCI of the proximal, mid/distal RCA segments with JOSE.  I was concerned about the diffuse nature of the proximal / mid LAD His stress ECHO showed hypokinesis of anterior/apical segments.  He had FFR of 0.78 in the LAD.  I would like him to stay on the current Rx including ASA, Atenolol, and statin. With normal LVEF and no prior acute coronary syndrome and HTN well controlled, I do not think the addition of an ACE-I is necessary at this time.  In regard to physical exercise, I encourage him to remain active but to be vigilant regarding symptoms given the level of activity he is pursuing (kickboxing).  We will adjust lipid lowering medication.    2. Hypercholesterolemia.   LDL rising on max dose Lipitor.  It has also been risking on Crestor 40 mg qd.  Start Zetia 10 mg every day.    3. HTN.  Previously controlled on BB alone.   He notes it is controlled in ambulatory setting.  If future BP not well controlled, start Losartan 25 mg every day.    FOLLOW UP:  1 year      Tomás West MD    Divisions of Cardiology  [place], [place]    CC  Patient Care Team:  Mee Park APRN CNP as PCP - General (Family Practice)  Abby Tran APRN CNP as Assigned PCP

## 2019-05-06 DIAGNOSIS — E78.00 HYPERCHOLESTEREMIA: ICD-10-CM

## 2019-05-06 RX ORDER — ROSUVASTATIN CALCIUM 40 MG/1
TABLET, COATED ORAL
Qty: 90 TABLET | Refills: 3 | Status: SHIPPED | OUTPATIENT
Start: 2019-05-06 | End: 2020-04-06

## 2019-08-06 DIAGNOSIS — I25.10 CORONARY ARTERY DISEASE INVOLVING NATIVE CORONARY ARTERY OF NATIVE HEART WITHOUT ANGINA PECTORIS: ICD-10-CM

## 2019-08-07 RX ORDER — ATENOLOL 25 MG/1
TABLET ORAL
Qty: 30 TABLET | Refills: 4 | Status: SHIPPED | OUTPATIENT
Start: 2019-08-07 | End: 2020-01-14

## 2019-08-08 ENCOUNTER — ALLIED HEALTH/NURSE VISIT (OUTPATIENT)
Dept: PEDIATRICS | Facility: CLINIC | Age: 59
End: 2019-08-08
Payer: COMMERCIAL

## 2019-08-08 VITALS — SYSTOLIC BLOOD PRESSURE: 110 MMHG | DIASTOLIC BLOOD PRESSURE: 64 MMHG | HEART RATE: 66 BPM

## 2019-08-08 DIAGNOSIS — I10 HTN, GOAL BELOW 140/90: Primary | ICD-10-CM

## 2019-08-08 PROCEDURE — 99207 ZZC NO CHARGE NURSE ONLY: CPT | Performed by: NURSE PRACTITIONER

## 2019-08-08 NOTE — PROGRESS NOTES
Tremayne Galarza was evaluated at [facility] on August 8, 2019 at which time his blood pressure was:    BP Readings from Last 3 Encounters:   08/07/19 110/64   04/01/19 123/76   08/17/18 139/81     Pulse Readings from Last 3 Encounters:   08/07/19 66   04/01/19 58   08/17/18 50       Reviewed lifestyle modifications for blood pressure control and reduction: including making healthy food choices, managing weight, getting regular exercise, smoking cessation, reducing alcohol consumption, monitoring blood pressure regularly.     Symptoms: None    BP Goal:< 140/90 mmHg    BP Assessment:  BP at goal    Potential Reasons for BP too high: NA - Not applicable    BP Follow-Up Plan: Recheck BP in 6 months at pharmacy    Recommendation to Provider: none    Note completed by: Mo[facility] Pharm. D.  [facility] Pharmacy Manager  [phone]  8/8/2019

## 2019-12-23 ENCOUNTER — ANCILLARY PROCEDURE (OUTPATIENT)
Dept: CT IMAGING | Facility: CLINIC | Age: 59
End: 2019-12-23
Attending: NURSE PRACTITIONER
Payer: COMMERCIAL

## 2019-12-23 ENCOUNTER — OFFICE VISIT (OUTPATIENT)
Dept: PEDIATRICS | Facility: CLINIC | Age: 59
End: 2019-12-23
Payer: COMMERCIAL

## 2019-12-23 VITALS
WEIGHT: 216.7 LBS | TEMPERATURE: 98.4 F | DIASTOLIC BLOOD PRESSURE: 70 MMHG | SYSTOLIC BLOOD PRESSURE: 132 MMHG | HEART RATE: 73 BPM | OXYGEN SATURATION: 97 % | BODY MASS INDEX: 30.44 KG/M2

## 2019-12-23 DIAGNOSIS — K62.89 RECTAL PAIN: Primary | ICD-10-CM

## 2019-12-23 DIAGNOSIS — K61.1 PERIRECTAL ABSCESS: ICD-10-CM

## 2019-12-23 DIAGNOSIS — K62.89 RECTAL PAIN: ICD-10-CM

## 2019-12-23 LAB
ALBUMIN SERPL-MCNC: 4 G/DL (ref 3.4–5)
ALP SERPL-CCNC: 64 U/L (ref 40–150)
ALT SERPL W P-5'-P-CCNC: 36 U/L (ref 0–70)
ANION GAP SERPL CALCULATED.3IONS-SCNC: 4 MMOL/L (ref 3–14)
AST SERPL W P-5'-P-CCNC: 25 U/L (ref 0–45)
BASOPHILS # BLD AUTO: 0 10E9/L (ref 0–0.2)
BASOPHILS NFR BLD AUTO: 0.2 %
BILIRUB SERPL-MCNC: 0.6 MG/DL (ref 0.2–1.3)
BUN SERPL-MCNC: 20 MG/DL (ref 7–30)
CALCIUM SERPL-MCNC: 9.4 MG/DL (ref 8.5–10.1)
CHLORIDE SERPL-SCNC: 106 MMOL/L (ref 94–109)
CO2 SERPL-SCNC: 30 MMOL/L (ref 20–32)
CREAT SERPL-MCNC: 0.93 MG/DL (ref 0.66–1.25)
DIFFERENTIAL METHOD BLD: NORMAL
EOSINOPHIL # BLD AUTO: 0.2 10E9/L (ref 0–0.7)
EOSINOPHIL NFR BLD AUTO: 1.9 %
ERYTHROCYTE [DISTWIDTH] IN BLOOD BY AUTOMATED COUNT: 11.9 % (ref 10–15)
GFR SERPL CREATININE-BSD FRML MDRD: >90 ML/MIN/{1.73_M2}
GLUCOSE SERPL-MCNC: 108 MG/DL (ref 70–99)
HCT VFR BLD AUTO: 44 % (ref 40–53)
HGB BLD-MCNC: 14.7 G/DL (ref 13.3–17.7)
IMM GRANULOCYTES # BLD: 0 10E9/L (ref 0–0.4)
IMM GRANULOCYTES NFR BLD: 0.5 %
LYMPHOCYTES # BLD AUTO: 1.7 10E9/L (ref 0.8–5.3)
LYMPHOCYTES NFR BLD AUTO: 19.6 %
MCH RBC QN AUTO: 32.2 PG (ref 26.5–33)
MCHC RBC AUTO-ENTMCNC: 33.4 G/DL (ref 31.5–36.5)
MCV RBC AUTO: 97 FL (ref 78–100)
MONOCYTES # BLD AUTO: 0.7 10E9/L (ref 0–1.3)
MONOCYTES NFR BLD AUTO: 8.4 %
NEUTROPHILS # BLD AUTO: 6 10E9/L (ref 1.6–8.3)
NEUTROPHILS NFR BLD AUTO: 69.4 %
PLATELET # BLD AUTO: 153 10E9/L (ref 150–450)
POTASSIUM SERPL-SCNC: 3.9 MMOL/L (ref 3.4–5.3)
PROT SERPL-MCNC: 7.8 G/DL (ref 6.8–8.8)
RBC # BLD AUTO: 4.56 10E12/L (ref 4.4–5.9)
SODIUM SERPL-SCNC: 140 MMOL/L (ref 133–144)
WBC # BLD AUTO: 8.6 10E9/L (ref 4–11)

## 2019-12-23 PROCEDURE — 99214 OFFICE O/P EST MOD 30 MIN: CPT | Performed by: NURSE PRACTITIONER

## 2019-12-23 PROCEDURE — 85025 COMPLETE CBC W/AUTO DIFF WBC: CPT | Performed by: NURSE PRACTITIONER

## 2019-12-23 PROCEDURE — 74177 CT ABD & PELVIS W/CONTRAST: CPT | Mod: TC

## 2019-12-23 PROCEDURE — 36415 COLL VENOUS BLD VENIPUNCTURE: CPT | Performed by: NURSE PRACTITIONER

## 2019-12-23 PROCEDURE — 80053 COMPREHEN METABOLIC PANEL: CPT | Performed by: NURSE PRACTITIONER

## 2019-12-23 RX ORDER — IOPAMIDOL 755 MG/ML
500 INJECTION, SOLUTION INTRAVASCULAR ONCE
Status: COMPLETED | OUTPATIENT
Start: 2019-12-23 | End: 2019-12-23

## 2019-12-23 RX ORDER — CIPROFLOXACIN 500 MG/1
500 TABLET, FILM COATED ORAL 2 TIMES DAILY
Qty: 14 TABLET | Refills: 0 | Status: SHIPPED | OUTPATIENT
Start: 2019-12-23 | End: 2020-01-08

## 2019-12-23 RX ORDER — METRONIDAZOLE 500 MG/1
500 TABLET ORAL 2 TIMES DAILY
Qty: 14 TABLET | Refills: 0 | Status: SHIPPED | OUTPATIENT
Start: 2019-12-23 | End: 2020-01-08

## 2019-12-23 RX ADMIN — IOPAMIDOL 100 ML: 755 INJECTION, SOLUTION INTRAVASCULAR at 17:03

## 2019-12-23 RX ADMIN — Medication 81 ML: at 17:04

## 2019-12-23 NOTE — NURSING NOTE
"Chief Complaint   Patient presents with     Rectal Problem     having some rectal pain and pressure x 3-4 days       Initial /70 (BP Location: Right arm, Patient Position: Sitting, Cuff Size: Adult Regular)   Pulse 73   Temp 98.4  F (36.9  C) (Temporal)   Wt 98.3 kg (216 lb 11.2 oz)   SpO2 97%   BMI 30.44 kg/m   Estimated body mass index is 30.44 kg/m  as calculated from the following:    Height as of 3/21/18: 1.797 m (5' 10.75\").    Weight as of this encounter: 98.3 kg (216 lb 11.2 oz).  Medication Reconciliation: complete      INDIA Sousa      "

## 2019-12-23 NOTE — PATIENT INSTRUCTIONS
PLAN:   1.   Symptomatic therapy suggested: increase fluids, OTC acetaminophen and call prn if symptoms persist or worsen.  Start on antibiotics  2.  Orders Placed This Encounter   Medications     ciprofloxacin (CIPRO) 500 MG tablet     Sig: Take 1 tablet (500 mg) by mouth 2 times daily for 7 days     Dispense:  14 tablet     Refill:  0     metroNIDAZOLE (FLAGYL) 500 MG tablet     Sig: Take 1 tablet (500 mg) by mouth 2 times daily for 7 days     Dispense:  14 tablet     Refill:  0     Orders Placed This Encounter   Procedures     CT Abdomen Pelvis w Contrast     CBC with platelets differential     Comprehensive metabolic panel       3. Patient needs to follow up in if no improvement,or sooner if worsening of symptoms or other symptoms develop.  FURTHER TESTING:       - CT pelvis  I will place order. Please call [phone] to schedule.  Will follow up and/or notify patient of  results via My Chart to determine further need for followup

## 2019-12-23 NOTE — PROGRESS NOTES
Subjective     Tremayne Galarza is a 59 year old male who presents to clinic today for the following health issues:    HPI   Concern - rectal pain and pressure  Onset: 3-4 days    Description:   Patient notes having some rectal pain for the last 3 days. Has not noticed an blood in the stools or when he wipes. Patient does not see anything externally. He notes pain is tolerable in the morning but will get worst throughout the day when he is walking and moving. Has been using colace and his stools have been soft.     Intensity: moderate, severe    Progression of Symptoms:  same    Accompanying Signs & Symptoms:  Rectal pain    Previous history of similar problem:   none    Precipitating factors:   Worsened by: sitting, walking    Alleviating factors:  Improved by: preparation H, colace     Therapies Tried and outcome: stool softeners, soak in the tub, preparation H  Rectal pain and pressure   Started about 3 days ago   Feels like a baseball by his rectum with pressure   Thinks had some chills  No injury, No fall   No rectal bleeding   Has had hemorrhoids in the past but this is different  Tender all the way around his rectum   Last few days using preperation H   Has been on stool softener and constipation medications but not really done anything     Patient Active Problem List   Diagnosis     Chronic ischemic heart disease     Benign neoplasm of colon     Hyperlipidemia with target LDL less than 100     HTN, goal below 140/90     Lesion of plantar nerve     Obesity     Allergic rhinitis     Postsurgical percutaneous transluminal coronary angioplasty status     Nondependent alcohol abuse     Status post coronary angiogram     CAD (coronary artery disease)     LUQ abdominal pain     Verruca plantaris     Past Surgical History:   Procedure Laterality Date     BIOPSY       CARDIAC SURGERY       COLONOSCOPY N/A 12/30/2015    Procedure: COLONOSCOPY;  Surgeon: Duane, William Charles, MD;  Location: [facility] OR     COLONOSCOPY WITH  CO2 INSUFFLATION N/A 12/30/2015    Procedure: COLONOSCOPY WITH CO2 INSUFFLATION;  Surgeon: Duane, William Charles, MD;  Location:  OR       Social History     Tobacco Use     Smoking status: Never Smoker     Smokeless tobacco: Never Used   Substance Use Topics     Alcohol use: Yes     Family History   Problem Relation Age of Onset     Coronary Artery Disease Mother      Hypertension Mother      Cerebrovascular Disease Mother          Current Outpatient Medications   Medication Sig Dispense Refill     aspirin 325 MG tablet Take 1 tablet (325 mg) by mouth daily 90 tablet 3     atenolol (TENORMIN) 25 MG tablet TAKE ONE TABLET BY MOUTH EVERY DAY 30 tablet 4     ezetimibe (ZETIA) 10 MG tablet Take 1 tablet (10 mg) by mouth daily 90 tablet 3     fexofenadine (ALLEGRA) 60 MG tablet Take 0.5 tablets (30 mg) by mouth daily 60 tablet 3     rosuvastatin (CRESTOR) 40 MG tablet TAKE ONE TABLET BY MOUTH EVERY DAY 90 tablet 3     sildenafil (VIAGRA) 50 MG tablet as needed for ED       Zinc Sulfate 220 (50 ZN) MG TABS Take 1 tablet by mouth 2 times daily (Patient not taking: Reported on 4/1/2019) 60 tablet 0     No Known Allergies  BP Readings from Last 3 Encounters:   12/23/19 132/70   08/07/19 110/64   04/01/19 123/76    Wt Readings from Last 3 Encounters:   12/23/19 98.3 kg (216 lb 11.2 oz)   04/01/19 97.5 kg (215 lb)   04/30/18 91.4 kg (201 lb 6.4 oz)           Reviewed and updated as needed this visit by Provider         Review of Systems   ROS COMP: Constitutional, HEENT, cardiovascular, pulmonary, gi and gu systems are negative, except as otherwise noted.      Objective    /70 (BP Location: Right arm, Patient Position: Sitting, Cuff Size: Adult Regular)   Pulse 73   Temp 98.4  F (36.9  C) (Temporal)   Wt 98.3 kg (216 lb 11.2 oz)   SpO2 97%   BMI 30.44 kg/m    Body mass index is 30.44 kg/m .  Physical Exam   GENERAL APPEARANCE: alert, active and no distress  RESP: lungs clear to auscultation - no rales, rhonchi  or wheezes  CV: regular rates and rhythm and no murmur, click or rub  ABDOMEN: soft, nontender, without hepatosplenomegaly or masses and bowel sounds normal  Rectal exam: perirectal area is tender to palpation. Mild erythema. No induration  MS: extremities normal- no gross deformities noted  SKIN: Skin color, texture, turgor normal.   PSYCH: mentation appears normal and affect normal/bright    Diagnostic Test Results:  Results for orders placed or performed in visit on 12/23/19   CBC with platelets differential     Status: None   Result Value Ref Range    WBC 8.6 4.0 - 11.0 10e9/L    RBC Count 4.56 4.4 - 5.9 10e12/L    Hemoglobin 14.7 13.3 - 17.7 g/dL    Hematocrit 44.0 40.0 - 53.0 %    MCV 97 78 - 100 fl    MCH 32.2 26.5 - 33.0 pg    MCHC 33.4 31.5 - 36.5 g/dL    RDW 11.9 10.0 - 15.0 %    Platelet Count 153 150 - 450 10e9/L    Diff Method Automated Method     % Neutrophils 69.4 %    % Lymphocytes 19.6 %    % Monocytes 8.4 %    % Eosinophils 1.9 %    % Basophils 0.2 %    % Immature Granulocytes 0.5 %    Absolute Neutrophil 6.0 1.6 - 8.3 10e9/L    Absolute Lymphocytes 1.7 0.8 - 5.3 10e9/L    Absolute Monocytes 0.7 0.0 - 1.3 10e9/L    Absolute Eosinophils 0.2 0.0 - 0.7 10e9/L    Absolute Basophils 0.0 0.0 - 0.2 10e9/L    Abs Immature Granulocytes 0.0 0 - 0.4 10e9/L   Comprehensive metabolic panel     Status: Abnormal   Result Value Ref Range    Sodium 140 133 - 144 mmol/L    Potassium 3.9 3.4 - 5.3 mmol/L    Chloride 106 94 - 109 mmol/L    Carbon Dioxide 30 20 - 32 mmol/L    Anion Gap 4 3 - 14 mmol/L    Glucose 108 (H) 70 - 99 mg/dL    Urea Nitrogen 20 7 - 30 mg/dL    Creatinine 0.93 0.66 - 1.25 mg/dL    GFR Estimate >90 >60 mL/min/[1.73_m2]    GFR Estimate If Black >90 >60 mL/min/[1.73_m2]    Calcium 9.4 8.5 - 10.1 mg/dL    Bilirubin Total 0.6 0.2 - 1.3 mg/dL    Albumin 4.0 3.4 - 5.0 g/dL    Protein Total 7.8 6.8 - 8.8 g/dL    Alkaline Phosphatase 64 40 - 150 U/L    ALT 36 0 - 70 U/L    AST 25 0 - 45 U/L     Recent  Results (from the past 744 hour(s))   CT Abdomen Pelvis w Contrast    Narrative    CT ABDOMEN AND PELVIS WITH CONTRAST   12/23/2019 5:06 PM     HISTORY:  Concern for perirectal abscess. Rectal pain.    TECHNIQUE:  CT abdomen and pelvis with 100 mL Isovue-370 IV. Radiation  dose for this scan was reduced using automated exposure control,  adjustment of the mA and/or kV according to patient size, or iterative  reconstruction technique.    COMPARISON: None.    FINDINGS:  Visualized lung bases are unremarkable.    No focal hepatic lesion seen. No intrahepatic or extrahepatic biliary  duct dilatation is present. Gallbladder is unremarkable.    The spleen, adrenal glands and pancreas are unremarkable. Kidneys  enhance symmetrically. No hydronephrosis is seen. Subcentimeter  hypoattenuating lesions in the upper and midpole of the right kidney  are too small to characterize.    Stomach is moderately distended with ingested contents and air. Small  and large bowel loops are nondilated. Scattered colonic diverticulosis  without convincing evidence of diverticulitis. The appendix is normal.    Scattered atherosclerotic vascular calcifications seen in the  abdominal aorta and iliac branches. IVC is of normal course and  caliber. No retroperitoneal or mesenteric lymphadenopathy is seen.  Calcified nodule measuring 1.1 x 1.2 cm noted within the right lower  quadrant (series 2, image 62).    Urinary bladder is mildly distended. Prostate gland is unremarkable. A  2.7 x 2.0 x 3.8 cm perianal fluid collection is seen at the 6:00  position (series 4, image 39). Mild surrounding wall enhancement is  also noted.    No suspicious osseous lesions are seen. Multilevel degenerative  changes are seen in the spine. Bilateral L5 pars defects.      Impression    IMPRESSION:  1.  3.8 cm perianal fluid collection suspicious for an abscess  collection.  2.  1.2 cm calcified nodule in the right lower quadrant which may  represent a calcified  diverticulum from the adjacent bowel loop.    AGUSTIN WILEY MD             Assessment & Plan     Tremayne was seen today for rectal problem.    Diagnoses and all orders for this visit:    Rectal pain  -     CBC with platelets differential  -     Comprehensive metabolic panel  -     CT Abdomen Pelvis w Contrast; Future  -    ciprofloxacin (CIPRO) 500 MG tablet; Take 1 tablet (500 mg) by mouth 2 times daily for 7 days  -    : metroNIDAZOLE (FLAGYL) 500 MG tablet; Take 1 tablet (500 mg) by mouth 2 times daily for 7 days  -     GENERAL SURG ADULT REFERRAL; Future    Perirectal abscess possible   -    ciprofloxacin (CIPRO) 500 MG tablet; Take 1 tablet (500 mg) by mouth 2 times daily for 7 days  -      metroNIDAZOLE (FLAGYL) 500 MG tablet; Take 1 tablet (500 mg) by mouth 2 times daily for 7 days  -     GENERAL SURG ADULT REFERRAL; Future    See Patient Instructions  Patient Instructions     PLAN:   1.   Symptomatic therapy suggested: increase fluids, OTC acetaminophen and call prn if symptoms persist or worsen.  Start on antibiotics  2.  Orders Placed This Encounter   Medications     ciprofloxacin (CIPRO) 500 MG tablet     Sig: Take 1 tablet (500 mg) by mouth 2 times daily for 7 days     Dispense:  14 tablet     Refill:  0     metroNIDAZOLE (FLAGYL) 500 MG tablet     Sig: Take 1 tablet (500 mg) by mouth 2 times daily for 7 days     Dispense:  14 tablet     Refill:  0     Orders Placed This Encounter   Procedures     CT Abdomen Pelvis w Contrast     CBC with platelets differential     Comprehensive metabolic panel       3. Patient needs to follow up in if no improvement,or sooner if worsening of symptoms or other symptoms develop.  FURTHER TESTING:       - CT pelvis  I will place order. Please call [phone] to schedule.  Will follow up and/or notify patient of  results via My Chart to determine further need for followup      No follow-ups on file.    Mee Park, SYLVIA CNP  [facility]

## 2019-12-24 NOTE — RESULT ENCOUNTER NOTE
Robina Galarza,    Attached are your test results.  -Normal red blood cell (hgb) levels, normal white blood cell count and normal platelet levels.  -Liver and gallbladder tests (ALT,AST, Alk phos,bilirubin) are normal.  -Kidney function (GFR) is normal.  -Sodium is normal.  -Potassium is normal.  -Calcium is normal.  -Glucose is mildly elevated but you were nonfasting and this is okay..   Please contact us if you have any questions.    Mee Park, CNP

## 2019-12-26 ENCOUNTER — OFFICE VISIT (OUTPATIENT)
Dept: SURGERY | Facility: CLINIC | Age: 59
End: 2019-12-26
Payer: COMMERCIAL

## 2019-12-26 ENCOUNTER — TELEPHONE (OUTPATIENT)
Dept: SURGERY | Facility: CLINIC | Age: 59
End: 2019-12-26

## 2019-12-26 VITALS
OXYGEN SATURATION: 94 % | TEMPERATURE: 98.2 F | WEIGHT: 220.2 LBS | HEART RATE: 82 BPM | DIASTOLIC BLOOD PRESSURE: 88 MMHG | BODY MASS INDEX: 29.82 KG/M2 | HEIGHT: 72 IN | SYSTOLIC BLOOD PRESSURE: 148 MMHG

## 2019-12-26 DIAGNOSIS — K61.1 PERIRECTAL ABSCESS: Primary | ICD-10-CM

## 2019-12-26 ASSESSMENT — PAIN SCALES - GENERAL: PAINLEVEL: EXTREME PAIN (9)

## 2019-12-26 ASSESSMENT — MIFFLIN-ST. JEOR: SCORE: 1861.82

## 2019-12-26 NOTE — TELEPHONE ENCOUNTER
[facility] Call Center    Phone Message    May a detailed message be left on voicemail: yes    Reason for Call: Other: .   Rectal, Perirectal abscess, NARA FUNEZ, per pt    For patients with a new abscess or who are symptomatic (new or return), send a high priority encounter to the clinic pool to have the patient assessed.  These patients typically need to be seen within 24-72 hours. Return patients not having issues should be scheduled with Boubacar Mckeon only.      Action Taken: Message routed to:  [facility] ([facility] colon/rectal

## 2019-12-26 NOTE — LETTER
2019       RE: Tremayne Galarza  [address] [place] [address]     Dear Colleague,    Thank you for referring your patient, Tremayne Galarza, to the [facility] COLON AND RECTAL SURGERY at [facility]. Please see a copy of my visit note below.    Colon and Rectal Surgery Consult Clinic Note    Referring provider:  Mee Park, APRN CNP  [address]  [place] [address]       RE: Tremayne Galarza  : 1960  STEFANIA: 2019      Tremayne Galarza is a very pleasant 59 year old male with a recent diagnosis of a perirectal abscess.  Given these findings they were subsequently sent to the Colon and Rectal Surgery Clinic for an opinion on this and a new patient consultation.     HISTORY OF PRESENT ILNESS: The patient has had symptoms for about 7-10 days with things getting worse over the weekend. On Monday () he was seen in primary care clinic and a CT scan was obtained. This demonstrated a perirectal abscess and he was placed on antibiotics and referred to colorectal. We are seeing him on an expedited basis. Sitting and walking hurts. No history of trauma. He thought at first he was constipated but was not and is having normal bowel movements. He has been taking a stool softener. No overt fevers but some chills. No nausea or vomiting. Colonoscopy is up to date. Most recent one was without polyps but he did have polyps in the past. No history of hemorrhoids or bright red blood per rectum. Eating okay. Denies a history of colitis or Crohn's disease. He is here today with his wife.    CT scan was reviewed with the patient and his wife. The abscess is posterior, slightly to the right, about 2 cm deep to the skin, largest in diameter about 3.5 cm (3 days prior).    PLEASE SEE NOTE BELOW FOR PHYSICAL EXAMINATION, REVIEW OF SYSTEMS, AND OTHER HISTORY.      Assessment:  Perirectal abscess, drained today in clinic.    Plan:     He will complete the full week of  antibiotics (Cipro/Flagyl).    Return to clinic in 1-2 weeks. Call in the interim if issues.    Soaking bathes encouraged. Discussed warning signs of infection or recurrence.    He understands that there is a possibility of thi being a perirectal fistula or that this abcess could heal fully without other sequelae. We will monitor his healing and this will help us determine if anything more is needed.     Total time was 30 minutes, over 50% was spent counseling the patient.     PLEASE SEE NOTE BELOW FOR PHYSICAL EXAMINATION, REVIEW OF SYSTEMS, AND OTHER HISTORY.    Camila Calderon MD, MD  Colon and Rectal Surgery Attending  Department of Surgery  [facility]      -------------------------------------------------------------------------------------------------------------------          Medical history:  Past Medical History:   Diagnosis Date     Chronic ischemic heart disease 9/4/2014    Overview:  3 stents to RCA 8/2014      Essential hypertension 2/11/2005    Overview:  LW Onset:  [id number]      Hyperlipidemia 11/14/2005    Overview:  LW Onset:  14Nov05        Surgical history:  Past Surgical History:   Procedure Laterality Date     BIOPSY       CARDIAC SURGERY       COLONOSCOPY N/A 12/30/2015    Procedure: COLONOSCOPY;  Surgeon: Duane, William Charles, MD;  Location: [facility] OR     COLONOSCOPY WITH CO2 INSUFFLATION N/A 12/30/2015    Procedure: COLONOSCOPY WITH CO2 INSUFFLATION;  Surgeon: Duane, William Charles, MD;  Location: [facility] OR       Problem list:  Patient Active Problem List    Diagnosis Date Noted     Verruca plantaris 04/11/2018     Priority: Medium     LUQ abdominal pain 03/27/2018     Priority: Medium     CAD (coronary artery disease) 08/05/2015     Priority: Medium     Status post coronary angiogram 03/26/2015     Priority: Medium     Nondependent alcohol abuse 02/03/2015     Priority: Medium     Chronic ischemic heart disease 09/04/2014     Priority: Medium     Overview:    3 stents to RCA 8/2014       Postsurgical percutaneous transluminal coronary angioplasty status 09/04/2014     Priority: Medium     Benign neoplasm of colon 06/05/2013     Priority: Medium     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 Provider to review and confirm.       HTN, goal below 140/90 02/11/2005     Priority: Medium     Overview:   LW Onset:  [id number]       Obesity 02/11/2005     Priority: Medium     Overview:   LW Onset:  11Feb05       Allergic rhinitis 02/11/2005     Priority: Medium     Overview:   LW Onset:  11Feb05         Medications:  Current Outpatient Medications   Medication Sig Dispense Refill     aspirin 325 MG tablet Take 1 tablet (325 mg) by mouth daily 90 tablet 3     atenolol (TENORMIN) 25 MG tablet TAKE ONE TABLET BY MOUTH EVERY DAY 30 tablet 4     ciprofloxacin (CIPRO) 500 MG tablet Take 1 tablet (500 mg) by mouth 2 times daily for 7 days 14 tablet 0     ezetimibe (ZETIA) 10 MG tablet Take 1 tablet (10 mg) by mouth daily 90 tablet 3     fexofenadine (ALLEGRA) 60 MG tablet Take 0.5 tablets (30 mg) by mouth daily 60 tablet 3     metroNIDAZOLE (FLAGYL) 500 MG tablet Take 1 tablet (500 mg) by mouth 2 times daily for 7 days 14 tablet 0     rosuvastatin (CRESTOR) 40 MG tablet TAKE ONE TABLET BY MOUTH EVERY DAY 90 tablet 3     sildenafil (VIAGRA) 50 MG tablet as needed for ED         Allergies:  No Known Allergies    Family history:  Family History   Problem Relation Age of Onset     Coronary Artery Disease Mother      Hypertension Mother      Cerebrovascular Disease Mother        Social history:  Social History     Socioeconomic History     Marital status:      Spouse name: Not on file     Number of children: Not on file     Years of education: Not on file     Highest education level: Not on file   Occupational History      Not on file   Social Needs     Financial resource strain: Not on file     Food insecurity:     Worry: Not on file     Inability: Not on file     Transportation needs:     Medical: Not on file     Non-medical: Not on file   Tobacco Use     Smoking status: Never Smoker     Smokeless tobacco: Never Used   Substance and Sexual Activity     Alcohol use: Yes     Drug use: No     Sexual activity: Yes     Partners: Female   Lifestyle     Physical activity:     Days per week: Not on file     Minutes per session: Not on file     Stress: Not on file   Relationships     Social connections:     Talks on phone: Not on file     Gets together: Not on file     Attends Buddhism service: Not on file     Active member of club or organization: Not on file     Attends meetings of clubs or organizations: Not on file     Relationship status: Not on file     Intimate partner violence:     Fear of current or ex partner: Not on file     Emotionally abused: Not on file     Physically abused: Not on file     Forced sexual activity: Not on file   Other Topics Concern     Parent/sibling w/ CABG, MI or angioplasty before 65F 55M? Not Asked   Social History Narrative     Not on file         Nursing Notes:   Valeria Nascimento EMT  12/26/2019  9:44 AM  Signed  Chief Complaint   Patient presents with     RECHECK     Procedure follow up.       Vitals:    12/26/19 0941   BP: (!) 148/88   BP Location: Left arm   Patient Position: Sitting   Cuff Size: Adult Large   Pulse: 82   Temp: 98.2  F (36.8  C)   TempSrc: Oral   SpO2: 94%   Weight: 99.9 kg (220 lb 3.2 oz)   Height: 1.829 m (6')       Body mass index is 29.86 kg/m .      RAMAN COLINDRES Noelle, RN  12/26/2019 10:44 AM  Signed  Patient into clinic for an I & D of perianal abscess.      Procedure discussed with patient and Dr. Calderon and all questions were answered.  The patient sign informed consent for in clinic procedure of Incision drainage of perirectal abscess using two  patient identifiers. The consent has been scanned into the patient's chart.     Time out obtained prior to procedure.  (Please see Dr. Calderon procedure notes for details).  Patient tolerated the procedure well      Lidocaine 1% given in clinic to patient per Dr. Calderon.       Drug Amount given = 8 cc  Drug Amount wasted = 12 ml  Lidocaine bottle placed in hazardous waste container.   Kylee Feliciano RN  December 26, 2019    Patient was scheduled into clinic for a follow up appointment on 1/13/2020 with Juliette Giles NP        Physical Examination:  BP (!) 148/88 (BP Location: Left arm, Patient Position: Sitting, Cuff Size: Adult Large)   Pulse 82   Temp 98.2  F (36.8  C) (Oral)   Ht 6'   Wt 220 lb 3.2 oz   SpO2 94%   BMI 29.86 kg/m     General: Pleasant, mostly no acute distress  But pain with walking.    Perianal external examination:  Perianal skin: Intact with no excoriation or lichenification.  Lesions: No evidence of an external lesion, nodularity, or induration in the perianal region with an area of faint induration in the left posterior perianal skin.  Eversion of buttocks: not performed because of discomfort but nothing obvious  Skin tags or external hemorrhoids: None.  Digital rectal examination: Was deferred.    Anoscopy: Was deferred.    Procedures:  After informed consent was obtained, an incision and drainage was performed (Chaparoned and assisted by Kylee Feliciano RN and Marianela Coffey RN). The area was prepped with betadine and 1% lidocaine applied in the subcutaneous area near the indurated left posterior perianal skin. A finder needle (1 gauge) was used to find the abscess and it was 2 cm deep. A cruciate incision was made the and area drainage of about 10 ml. 2 loculated areas were opened and the area was irrigated. The patient tolerated this well and there was good hemostasis.     Again, thank you for allowing me to participate in the care of your patient.       Sincerely,    Camila Calderon MD

## 2019-12-26 NOTE — NURSING NOTE
Patient into clinic for an I & D of perianal abscess.      Procedure discussed with patient and Dr. Calderon and all questions were answered.  The patient sign informed consent for in clinic procedure of Incision drainage of perirectal abscess using two patient identifiers. The consent has been scanned into the patient's chart.     Time out obtained prior to procedure.  (Please see Dr. Calderon procedure notes for details).  Patient tolerated the procedure well      Lidocaine 1% given in clinic to patient per Dr. Calderon.       Drug Amount given = 8 cc  Drug Amount wasted = 12 ml  Lidocaine bottle placed in hazardous waste container.   Kylee Feliciano RN  December 26, 2019    Patient was scheduled into clinic for a follow up appointment on 1/13/2020 with Juliette Giles NP

## 2019-12-26 NOTE — NURSING NOTE
Chief Complaint   Patient presents with     RECHECK     Procedure follow up.       Vitals:    12/26/19 0941   BP: (!) 148/88   BP Location: Left arm   Patient Position: Sitting   Cuff Size: Adult Large   Pulse: 82   Temp: 98.2  F (36.8  C)   TempSrc: Oral   SpO2: 94%   Weight: 99.9 kg (220 lb 3.2 oz)   Height: 1.829 m (6')       Body mass index is 29.86 kg/m .                            DARIN RAMAN EMT

## 2019-12-27 NOTE — TELEPHONE ENCOUNTER
Patient was seen in clinic by Dr. Calderon for I & D. Has f/u appt with Juliette Giles NP on 1/13/20 @ 1:30pm.    Kaiden Stoddard LPN

## 2019-12-27 NOTE — PROGRESS NOTES
Colon and Rectal Surgery Consult Clinic Note    Referring provider:  Mee Park, APRN CNP  [address]  [place] [address]       RE: Tremayne Galarza  : 1960  STEFANIA: 2019      Tremayne Galarza is a very pleasant 59 year old male with a recent diagnosis of a perirectal abscess.  Given these findings they were subsequently sent to the Colon and Rectal Surgery Clinic for an opinion on this and a new patient consultation.     HISTORY OF PRESENT ILNESS: The patient has had symptoms for about 7-10 days with things getting worse over the weekend. On Monday () he was seen in primary care clinic and a CT scan was obtained. This demonstrated a perirectal abscess and he was placed on antibiotics and referred to colorectal. We are seeing him on an expedited basis. Sitting and walking hurts. No history of trauma. He thought at first he was constipated but was not and is having normal bowel movements. He has been taking a stool softener. No overt fevers but some chills. No nausea or vomiting. Colonoscopy is up to date. Most recent one was without polyps but he did have polyps in the past. No history of hemorrhoids or bright red blood per rectum. Eating okay. Denies a history of colitis or Crohn's disease. He is here today with his wife.    CT scan was reviewed with the patient and his wife. The abscess is posterior, slightly to the right, about 2 cm deep to the skin, largest in diameter about 3.5 cm (3 days prior).    PLEASE SEE NOTE BELOW FOR PHYSICAL EXAMINATION, REVIEW OF SYSTEMS, AND OTHER HISTORY.      Assessment:  Perirectal abscess, drained today in clinic.    Plan:     He will complete the full week of antibiotics (Cipro/Flagyl).    Return to clinic in 1-2 weeks. Call in the interim if issues.    Soaking bathes encouraged. Discussed warning signs of infection or recurrence.    He understands that there is a possibility of thi being a perirectal fistula or that this abcess could heal  fully without other sequelae. We will monitor his healing and this will help us determine if anything more is needed.     Total time was 30 minutes, over 50% was spent counseling the patient.     PLEASE SEE NOTE BELOW FOR PHYSICAL EXAMINATION, REVIEW OF SYSTEMS, AND OTHER HISTORY.    Camila Calderon MD, MD  Colon and Rectal Surgery Attending  Department of Surgery  [facility]      -------------------------------------------------------------------------------------------------------------------          Medical history:  Past Medical History:   Diagnosis Date     Chronic ischemic heart disease 9/4/2014    Overview:  3 stents to RCA 8/2014      Essential hypertension 2/11/2005    Overview:  LW Onset:  [id number]      Hyperlipidemia 11/14/2005    Overview:  LW Onset:  88Jil60        Surgical history:  Past Surgical History:   Procedure Laterality Date     BIOPSY       CARDIAC SURGERY       COLONOSCOPY N/A 12/30/2015    Procedure: COLONOSCOPY;  Surgeon: Duane, William Charles, MD;  Location: [facility] OR     COLONOSCOPY WITH CO2 INSUFFLATION N/A 12/30/2015    Procedure: COLONOSCOPY WITH CO2 INSUFFLATION;  Surgeon: Duane, William Charles, MD;  Location: [facility] OR       Problem list:  Patient Active Problem List    Diagnosis Date Noted     Verruca plantaris 04/11/2018     Priority: Medium     LUQ abdominal pain 03/27/2018     Priority: Medium     CAD (coronary artery disease) 08/05/2015     Priority: Medium     Status post coronary angiogram 03/26/2015     Priority: Medium     Nondependent alcohol abuse 02/03/2015     Priority: Medium     Chronic ischemic heart disease 09/04/2014     Priority: Medium     Overview:   3 stents to RCA 8/2014       Postsurgical percutaneous transluminal coronary angioplasty status 09/04/2014     Priority: Medium     Benign neoplasm of colon 06/05/2013     Priority: Medium     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 Provider to review and confirm.       HTN, goal below 140/90 02/11/2005     Priority: Medium     Overview:   LW Onset:  05Prs01       Obesity 02/11/2005     Priority: Medium     Overview:   LW Onset:  11Feb05       Allergic rhinitis 02/11/2005     Priority: Medium     Overview:   LW Onset:  11Feb05         Medications:  Current Outpatient Medications   Medication Sig Dispense Refill     aspirin 325 MG tablet Take 1 tablet (325 mg) by mouth daily 90 tablet 3     atenolol (TENORMIN) 25 MG tablet TAKE ONE TABLET BY MOUTH EVERY DAY 30 tablet 4     ciprofloxacin (CIPRO) 500 MG tablet Take 1 tablet (500 mg) by mouth 2 times daily for 7 days 14 tablet 0     ezetimibe (ZETIA) 10 MG tablet Take 1 tablet (10 mg) by mouth daily 90 tablet 3     fexofenadine (ALLEGRA) 60 MG tablet Take 0.5 tablets (30 mg) by mouth daily 60 tablet 3     metroNIDAZOLE (FLAGYL) 500 MG tablet Take 1 tablet (500 mg) by mouth 2 times daily for 7 days 14 tablet 0     rosuvastatin (CRESTOR) 40 MG tablet TAKE ONE TABLET BY MOUTH EVERY DAY 90 tablet 3     sildenafil (VIAGRA) 50 MG tablet as needed for ED         Allergies:  No Known Allergies    Family history:  Family History   Problem Relation Age of Onset     Coronary Artery Disease Mother      Hypertension Mother      Cerebrovascular Disease Mother        Social history:  Social History     Socioeconomic History     Marital status:      Spouse name: Not on file     Number of children: Not on file     Years of education: Not on file     Highest education level: Not on file   Occupational History     Not on file   Social Needs     Financial resource strain: Not on file     Food insecurity:     Worry: Not on file     Inability: Not on file     Transportation needs:     Medical: Not on file     Non-medical: Not on file   Tobacco Use     Smoking status: Never Smoker     Smokeless  tobacco: Never Used   Substance and Sexual Activity     Alcohol use: Yes     Drug use: No     Sexual activity: Yes     Partners: Female   Lifestyle     Physical activity:     Days per week: Not on file     Minutes per session: Not on file     Stress: Not on file   Relationships     Social connections:     Talks on phone: Not on file     Gets together: Not on file     Attends [employer] service: Not on file     Active member of club or organization: Not on file     Attends meetings of clubs or organizations: Not on file     Relationship status: Not on file     Intimate partner violence:     Fear of current or ex partner: Not on file     Emotionally abused: Not on file     Physically abused: Not on file     Forced sexual activity: Not on file   Other Topics Concern     Parent/sibling w/ CABG, MI or angioplasty before 65F 55M? Not Asked   Social History Narrative     Not on file         Nursing Notes:   Darin Raman, EMT  12/26/2019  9:44 AM  Signed  Chief Complaint   Patient presents with     RECHECK     Procedure follow up.       Vitals:    12/26/19 0941   BP: (!) 148/88   BP Location: Left arm   Patient Position: Sitting   Cuff Size: Adult Large   Pulse: 82   Temp: 98.2  F (36.8  C)   TempSrc: Oral   SpO2: 94%   Weight: 99.9 kg (220 lb 3.2 oz)   Height: 1.829 m (6')       Body mass index is 29.86 kg/m .                            DARIN RAMAN, EMT      Kylee Feliciano RN  12/26/2019 10:44 AM  Signed  Patient into clinic for an I & D of perianal abscess.      Procedure discussed with patient and Dr. Calderon and all questions were answered.  The patient sign informed consent for in clinic procedure of Incision drainage of perirectal abscess using two patient identifiers. The consent has been scanned into the patient's chart.     Time out obtained prior to procedure.  (Please see Dr. Calderon procedure notes for details).  Patient tolerated the procedure well      Lidocaine 1% given in clinic to patient per   Yumiko.       Drug Amount given = 8 cc  Drug Amount wasted = 12 ml  Lidocaine bottle placed in hazardous waste container.   Kylee Feliciano RN  December 26, 2019    Patient was scheduled into clinic for a follow up appointment on 1/13/2020 with Juliette Giles NP          Physical Examination:  BP (!) 148/88 (BP Location: Left arm, Patient Position: Sitting, Cuff Size: Adult Large)   Pulse 82   Temp 98.2  F (36.8  C) (Oral)   Ht 6'   Wt 220 lb 3.2 oz   SpO2 94%   BMI 29.86 kg/m    General: Pleasant, mostly no acute distress  But pain with walking.    Perianal external examination:  Perianal skin: Intact with no excoriation or lichenification.  Lesions: No evidence of an external lesion, nodularity, or induration in the perianal region with an area of faint induration in the right posterior perianal skin.  Eversion of buttocks: not performed because of discomfort but nothing obvious  Skin tags or external hemorrhoids: None.  Digital rectal examination: Was deferred.    Anoscopy: Was deferred.      Procedures:  After informed consent was obtained, an incision and drainage was performed [facility] and assisted by Kylee Feliciano RN and Marianela Coffey RN). The area was prepped with betadine and 1% lidocaine applied in the subcutaneous area near the indurated right posterior perianal skin. A finder needle (1 gauge) was used to find the abscess and it was 2 cm deep. A cruciate incision was made the and area drainage of about 10 ml. 2 loculated areas were opened and the area was irrigated. The patient tolerated this well and there was good hemostasis.

## 2020-01-08 ENCOUNTER — OFFICE VISIT (OUTPATIENT)
Dept: SURGERY | Facility: CLINIC | Age: 60
End: 2020-01-08
Payer: COMMERCIAL

## 2020-01-08 VITALS
WEIGHT: 215 LBS | SYSTOLIC BLOOD PRESSURE: 140 MMHG | HEART RATE: 79 BPM | DIASTOLIC BLOOD PRESSURE: 93 MMHG | BODY MASS INDEX: 29.12 KG/M2 | HEIGHT: 72 IN | OXYGEN SATURATION: 95 %

## 2020-01-08 DIAGNOSIS — K61.0 PERIANAL ABSCESS: Primary | ICD-10-CM

## 2020-01-08 DIAGNOSIS — K60.30 ANAL FISTULA: ICD-10-CM

## 2020-01-08 RX ORDER — METRONIDAZOLE 500 MG/1
500 TABLET ORAL 2 TIMES DAILY
Qty: 14 TABLET | Refills: 0 | Status: SHIPPED | OUTPATIENT
Start: 2020-01-08 | End: 2020-02-27

## 2020-01-08 RX ORDER — LIDOCAINE HYDROCHLORIDE AND EPINEPHRINE 10; 10 MG/ML; UG/ML
3 INJECTION, SOLUTION INFILTRATION; PERINEURAL ONCE
Status: DISCONTINUED | OUTPATIENT
Start: 2020-01-08 | End: 2020-01-10

## 2020-01-08 RX ORDER — CIPROFLOXACIN 500 MG/1
500 TABLET, FILM COATED ORAL 2 TIMES DAILY
Qty: 14 TABLET | Refills: 0 | Status: SHIPPED | OUTPATIENT
Start: 2020-01-08 | End: 2020-02-27

## 2020-01-08 ASSESSMENT — PAIN SCALES - GENERAL: PAINLEVEL: SEVERE PAIN (7)

## 2020-01-08 ASSESSMENT — MIFFLIN-ST. JEOR: SCORE: 1838.23

## 2020-01-08 NOTE — LETTER
2020       RE: Tremayne Galarza  [address] [place] [address]     Dear Colleague,    Thank you for referring your patient, Tremayne Galarza, to the [facility] COLON AND RECTAL SURGERY at [facility]. Please see a copy of my visit note below.    Colon and Rectal Surgery Follow-up Clinic Note    Referring provider:  Mee Park, APRN CNP  [address]  [place] [address]       RE: Tremayne Galarza  : 1960  STEFANIA: 2020      Tremayne Galarza is a very pleasant 59 year old male with a recent diagnosis of a perirectal abscess drained almost 2 weeks ago in clinic now with recurrent pain.      HISTORY OF PRESENT ILNESS:     Presented with perianal/rectal pain for about 7-10 days.     2019 - seen in primary care clinic and a CT scan was obtained.     CT scan 2019 - This demonstrated a perirectal abscess. Abscess is posterior, slightly to the right, about 2 cm deep to the skin, largest in diameter about 3.5 cm    Placed on antibiotics and referred to colorectal.     Patient contacted me directly 2019 and he was seen in clinic and the abscess was drained.     Colonoscopy is up to date. Most recent one was without polyps but he did have polyps in the past.    Denies a history of colitis or Crohn's disease  Interim History: The patient initially did well. He was doing soaking bathes and drainage stopped. But unfortunately began to have pressure and pain again as well as drainage. No longer on antibiotics. No fevers.     PLEASE SEE NOTE BELOW FOR PHYSICAL EXAMINATION, REVIEW OF SYSTEMS, AND OTHER HISTORY.    Assessment:  Perirectal abscess, drained today in clinic. Appears to have a fistula with fluid connecting from the abscess pocket into the rectum.    Plan:     Incision and drainage of abscess today with irrigation of abscess. And demonstration of the fistula.    Examination under anesthesia with likely seton.     He understands that this  is a perirectal fistula and a seton will likely be needed and additional treatment.     Total time was 15 minutes, over 50% was spent counseling the patient.     PLEASE SEE NOTE BELOW FOR PHYSICAL EXAMINATION, REVIEW OF SYSTEMS, AND OTHER HISTORY.    Camila Calderon MD, MD  Colon and Rectal Surgery Attending  Department of Surgery  [facility]      -------------------------------------------------------------------------------------------------------------------      Medical history:  Past Medical History:   Diagnosis Date     Chronic ischemic heart disease 9/4/2014    Overview:  3 stents to RCA 8/2014      Essential hypertension 2/11/2005    Overview:  LW Onset:  53Iip19      Hyperlipidemia 11/14/2005    Overview:  LW Onset:  88Xgw94        Surgical history:  Past Surgical History:   Procedure Laterality Date     BIOPSY       CARDIAC SURGERY       COLONOSCOPY N/A 12/30/2015    Procedure: COLONOSCOPY;  Surgeon: Duane, William Charles, MD;  Location: [facility] OR     COLONOSCOPY WITH CO2 INSUFFLATION N/A 12/30/2015    Procedure: COLONOSCOPY WITH CO2 INSUFFLATION;  Surgeon: Duane, William Charles, MD;  Location: [facility] OR     EXAM UNDER ANESTHESIA ANUS N/A 1/10/2020    Procedure: EXAM UNDER ANESTHESIA, ANUS, inscision and drainage of perirectal abscess, partial fistulotomy,  seton placement;  Surgeon: Camila Calderon MD;  [facility] OR     FISTULOTOMY RECTUM N/A 1/10/2020    Procedure: partial FISTULOTOMY, RECTUM;  Surgeon: Camila Calderon MD;  Location: [facility] OR     PLACEMENT OF SETON RECTUM N/A 1/10/2020    Procedure: PLACEMENT, SETON STITCH;  Surgeon: Camila Calderon MD;  Location: [facility] OR       Problem list:    Patient Active Problem List    Diagnosis Date Noted     Perianal abscess 01/09/2020     Priority: Medium     Added automatically from request for surgery [id number]       Anal fistula 01/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     Priority: Medium     Added automatically from request  for surgery [id number]       Verruca plantaris 04/11/2018     Priority: Medium     LUQ abdominal pain 03/27/2018     Priority: Medium     CAD (coronary artery disease) 08/05/2015     Priority: Medium     Status post coronary angiogram 03/26/2015     Priority: Medium     Nondependent alcohol abuse 02/03/2015     Priority: Medium     Chronic ischemic heart disease 09/04/2014     Priority: Medium     Overview:   3 stents to RCA 8/2014       Postsurgical percutaneous transluminal coronary angioplasty status 09/04/2014     Priority: Medium     Benign neoplasm of colon 06/05/2013     Priority: Medium     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 Provider to review and confirm.       HTN, goal below 140/90 02/11/2005     Priority: Medium     Overview:   LW Onset:  95Stw27       Obesity 02/11/2005     Priority: Medium     Overview:   LW Onset:  11Feb05       Allergic rhinitis 02/11/2005     Priority: Medium     Overview:   LW Onset:  11Feb05         Medications:  Current Outpatient Medications   Medication Sig Dispense Refill     ciprofloxacin (CIPRO) 500 MG tablet Take 1 tablet (500 mg) by mouth 2 times daily 14 tablet 0     metroNIDAZOLE (FLAGYL) 500 MG tablet Take 1 tablet (500 mg) by mouth 2 times daily 14 tablet 0     aspirin 325 MG tablet Take 1 tablet (325 mg) by mouth daily (Patient taking differently: Take 81 mg by mouth daily ) 90 tablet 3     atenolol (TENORMIN) 25 MG tablet TAKE ONE TABLET BY MOUTH EVERY DAY 30 tablet 4     ezetimibe (ZETIA) 10 MG tablet Take 1 tablet (10 mg) by mouth daily 90 tablet 3     fexofenadine (ALLEGRA) 60 MG tablet Take 0.5 tablets (30 mg) by mouth daily 60 tablet 3     rosuvastatin (CRESTOR) 40 MG tablet TAKE ONE TABLET BY MOUTH EVERY DAY 90 tablet 3     sildenafil (VIAGRA) 50 MG tablet as needed for ED          Allergies:  No Known Allergies    Family history:  Family History   Problem Relation Age of Onset     Coronary Artery Disease Mother      Hypertension Mother      Cerebrovascular Disease Mother        Social history:  Social History     Socioeconomic History     Marital status:      Spouse name: Not on file     Number of children: Not on file     Years of education: Not on file     Highest education level: Not on file   Occupational History     Not on file   Social Needs     Financial resource strain: Not on file     Food insecurity:     Worry: Not on file     Inability: Not on file     Transportation needs:     Medical: Not on file     Non-medical: Not on file   Tobacco Use     Smoking status: Never Smoker     Smokeless tobacco: Never Used   Substance and Sexual Activity     Alcohol use: Yes     Drug use: No     Sexual activity: Yes     Partners: Female   Lifestyle     Physical activity:     Days per week: Not on file     Minutes per session: Not on file     Stress: Not on file   Relationships     Social connections:     Talks on phone: Not on file     Gets together: Not on file     Attends [employer] service: Not on file     Active member of club or organization: Not on file     Attends meetings of clubs or organizations: Not on file     Relationship status: Not on file     Intimate partner violence:     Fear of current or ex partner: Not on file     Emotionally abused: Not on file     Physically abused: Not on file     Forced sexual activity: Not on file   Other Topics Concern     Parent/sibling w/ CABG, MI or angioplasty before 65F 55M? Not Asked   Social History Narrative     Not on file       Nursing Notes:   Eliezer Childers, EMT  1/8/2020  6:56 PM  Signed  Clinic Administered Medication Documentation    MEDICATION LIST:   Injectable Medication Documentation    Patient was given lidocaine with epi 1;100,000. Prior to medication administration, verified patients identity using patient s name and  date of birth. Please see MAR and medication order for additional information. Patient instructed to remain in clinic for 15 minutes and report any adverse reaction to staff immediately .    Was entire vial of medication used? No, The remainder 17ML of 20ML was discarded as unavoidable waste.  Vial/Syringe: Multi dose vial  Expiration Date:  08/21  Was this medication supplied by the patient? No     RAMAN Tolentino  Colon and Rectal Surgery    Physical Examination:  BP (!) 140/93 (BP Location: Left arm, Patient Position: Sitting, Cuff Size: Adult Regular)   Pulse 79   Ht 1.829 m (6')   Wt 97.5 kg (215 lb)   SpO2 95%   BMI 29.16 kg/m     General: Pleasant, mostly no acute distress  But pain with walking.    Perianal external examination:  Perianal skin: Intact with no excoriation or lichenification.  Lesions: No evidence of an external lesion, nodularity, or induration in the perianal region with an area of faint induration in the left posterior perianal skin.  Eversion of buttocks: not performed because of discomfort but nothing obvious  Skin tags or external hemorrhoids: None.  Digital rectal examination: Was deferred.    Anoscopy: Was deferred.    Procedures:  After informed consent was obtained, an incision and drainage was performed ([facility] and assisted by Juliette Giles NP). The area was prepped with betadine and 1% lidocaine applied in the subcutaneous area near the indurated right posterior perianal skin. A cruciate incision was made over the previous abscess drainage site. The area drained about 5 ml. No loculated areas were found. The area was irrigated with 10 mL X 2. A small amount of saline was seen out of the anus. The patient tolerated this well and there was good hemostasis.     Again, thank you for allowing me to participate in the care of your patient.      Sincerely,    Camila Calderon MD

## 2020-01-09 ENCOUNTER — TELEPHONE (OUTPATIENT)
Dept: SURGERY | Facility: CLINIC | Age: 60
End: 2020-01-09

## 2020-01-09 ENCOUNTER — ANESTHESIA EVENT (OUTPATIENT)
Dept: SURGERY | Facility: AMBULATORY SURGERY CENTER | Age: 60
End: 2020-01-09

## 2020-01-09 PROBLEM — K60.30 ANAL FISTULA: Status: ACTIVE | Noted: 2020-01-09

## 2020-01-09 PROBLEM — K61.0 PERIANAL ABSCESS: Status: ACTIVE | Noted: 2020-01-09

## 2020-01-09 NOTE — TELEPHONE ENCOUNTER
Patient is scheduled for surgery with . Dr. Calderon    Spoke with Tremayne    Date of Surgery: 1/10/2020    Location: [facility]    Informed patient they will need an adult  Yes    H&P: completed with Dr. Calderon during office visit    Surgery packet: [place]     Additional comments: added to surgery calendar today 1/9/20 for surgery tomorrow

## 2020-01-09 NOTE — PATIENT INSTRUCTIONS
Follow up:  1. Surgery scheduler will call to schedule procedure      Please call with any questions or concerns regarding your clinic visit today.    It is a pleasure being involved in your health care.    Contacts post-consultation depending on your need:    Radiology Appointments [phone]    Schedule Clinic Appointments [phone] # 1   M-F 7:30 - 5 pm    JONAH Shields [phone]    Clinic Fax Number [phone]    Surgery Scheduling [phone]    My Chart is available 24 hours a day and is a secure way to access your records and communicate with your care team.  I strongly recommend signing up if you haven't already done so, if you are comfortable with computers.  If you would like to inquire about this or are having problems with My Chart access, you may call [phone] or go online at [email or web address].  Please allow at least 24 hours for a response and extra time on weekends and Holidays.

## 2020-01-09 NOTE — NURSING NOTE
Clinic Administered Medication Documentation    MEDICATION LIST:   Injectable Medication Documentation    Patient was given lidocaine with epi 1;100,000. Prior to medication administration, verified patients identity using patient s name and date of birth. Please see MAR and medication order for additional information. Patient instructed to remain in clinic for 15 minutes and report any adverse reaction to staff immediately .      Was entire vial of medication used? No, The remainder 17ML of 20ML was discarded as unavoidable waste.  Vial/Syringe: Multi dose vial  Expiration Date:  08/21  Was this medication supplied by the patient? No     Eliezer Childers, EMT  Colon and Rectal Surgery

## 2020-01-10 ENCOUNTER — HOSPITAL ENCOUNTER (OUTPATIENT)
Facility: AMBULATORY SURGERY CENTER | Age: 60
End: 2020-01-10
Attending: COLON & RECTAL SURGERY
Payer: COMMERCIAL

## 2020-01-10 ENCOUNTER — ANESTHESIA (OUTPATIENT)
Dept: SURGERY | Facility: AMBULATORY SURGERY CENTER | Age: 60
End: 2020-01-10

## 2020-01-10 VITALS
RESPIRATION RATE: 12 BRPM | OXYGEN SATURATION: 98 % | SYSTOLIC BLOOD PRESSURE: 131 MMHG | WEIGHT: 215 LBS | HEART RATE: 53 BPM | HEIGHT: 72 IN | DIASTOLIC BLOOD PRESSURE: 70 MMHG | TEMPERATURE: 98.2 F | BODY MASS INDEX: 29.12 KG/M2

## 2020-01-10 DIAGNOSIS — K60.30 ANAL FISTULA: ICD-10-CM

## 2020-01-10 DIAGNOSIS — K61.0 PERIANAL ABSCESS: ICD-10-CM

## 2020-01-10 RX ORDER — ONDANSETRON 4 MG/1
4 TABLET, ORALLY DISINTEGRATING ORAL EVERY 30 MIN PRN
Status: DISCONTINUED | OUTPATIENT
Start: 2020-01-10 | End: 2020-01-11 | Stop reason: HOSPADM

## 2020-01-10 RX ORDER — LIDOCAINE HYDROCHLORIDE 10 MG/ML
INJECTION, SOLUTION EPIDURAL; INFILTRATION; INTRACAUDAL; PERINEURAL PRN
Status: DISCONTINUED | OUTPATIENT
Start: 2020-01-10 | End: 2020-01-10 | Stop reason: HOSPADM

## 2020-01-10 RX ORDER — SODIUM CHLORIDE, SODIUM LACTATE, POTASSIUM CHLORIDE, CALCIUM CHLORIDE 600; 310; 30; 20 MG/100ML; MG/100ML; MG/100ML; MG/100ML
INJECTION, SOLUTION INTRAVENOUS CONTINUOUS
Status: DISCONTINUED | OUTPATIENT
Start: 2020-01-10 | End: 2020-01-11 | Stop reason: HOSPADM

## 2020-01-10 RX ORDER — PROPOFOL 10 MG/ML
INJECTION, EMULSION INTRAVENOUS CONTINUOUS PRN
Status: DISCONTINUED | OUTPATIENT
Start: 2020-01-10 | End: 2020-01-10

## 2020-01-10 RX ORDER — LIDOCAINE 40 MG/G
CREAM TOPICAL
Status: DISCONTINUED | OUTPATIENT
Start: 2020-01-10 | End: 2020-01-11 | Stop reason: HOSPADM

## 2020-01-10 RX ORDER — ONDANSETRON 2 MG/ML
4 INJECTION INTRAMUSCULAR; INTRAVENOUS EVERY 30 MIN PRN
Status: DISCONTINUED | OUTPATIENT
Start: 2020-01-10 | End: 2020-01-11 | Stop reason: HOSPADM

## 2020-01-10 RX ORDER — KETAMINE HYDROCHLORIDE 10 MG/ML
INJECTION, SOLUTION INTRAMUSCULAR; INTRAVENOUS PRN
Status: DISCONTINUED | OUTPATIENT
Start: 2020-01-10 | End: 2020-01-10

## 2020-01-10 RX ORDER — OXYCODONE HYDROCHLORIDE 5 MG/1
5 TABLET ORAL EVERY 6 HOURS PRN
Qty: 6 TABLET | Refills: 0 | Status: SHIPPED | OUTPATIENT
Start: 2020-01-10 | End: 2020-02-27

## 2020-01-10 RX ORDER — BUPIVACAINE HYDROCHLORIDE AND EPINEPHRINE 2.5; 5 MG/ML; UG/ML
INJECTION, SOLUTION EPIDURAL; INFILTRATION; INTRACAUDAL; PERINEURAL PRN
Status: DISCONTINUED | OUTPATIENT
Start: 2020-01-10 | End: 2020-01-10 | Stop reason: HOSPADM

## 2020-01-10 RX ORDER — ACETAMINOPHEN 325 MG/1
975 TABLET ORAL ONCE
Status: COMPLETED | OUTPATIENT
Start: 2020-01-10 | End: 2020-01-10

## 2020-01-10 RX ORDER — NALOXONE HYDROCHLORIDE 0.4 MG/ML
.1-.4 INJECTION, SOLUTION INTRAMUSCULAR; INTRAVENOUS; SUBCUTANEOUS
Status: DISCONTINUED | OUTPATIENT
Start: 2020-01-10 | End: 2020-01-11 | Stop reason: HOSPADM

## 2020-01-10 RX ORDER — FENTANYL CITRATE 50 UG/ML
25-50 INJECTION, SOLUTION INTRAMUSCULAR; INTRAVENOUS
Status: DISCONTINUED | OUTPATIENT
Start: 2020-01-10 | End: 2020-01-11 | Stop reason: HOSPADM

## 2020-01-10 RX ADMIN — KETAMINE HYDROCHLORIDE 20 MG: 10 INJECTION, SOLUTION INTRAMUSCULAR; INTRAVENOUS at 07:31

## 2020-01-10 RX ADMIN — ACETAMINOPHEN 975 MG: 325 TABLET ORAL at 06:24

## 2020-01-10 RX ADMIN — SODIUM CHLORIDE, SODIUM LACTATE, POTASSIUM CHLORIDE, CALCIUM CHLORIDE: 600; 310; 30; 20 INJECTION, SOLUTION INTRAVENOUS at 06:59

## 2020-01-10 RX ADMIN — PROPOFOL 125 MCG/KG/MIN: 10 INJECTION, EMULSION INTRAVENOUS at 07:29

## 2020-01-10 ASSESSMENT — MIFFLIN-ST. JEOR: SCORE: 1838.23

## 2020-01-10 ASSESSMENT — LIFESTYLE VARIABLES: TOBACCO_USE: 0

## 2020-01-10 NOTE — DISCHARGE INSTRUCTIONS
Anorectal Surgery Instructions      What can I expect after anorectal surgery?  Most anorectal procedures are done as outpatient surgery, and you go home the same day as the procedure. A few surgical procedures will require that you stay in the hospital for about one to three days. No matter where the procedure is done or how long or short it takes, these recommendations will help you heal and feel more comfortable.    Medicines:  The anal area is very sensitive; you can expect to have some pain for up to 2-4 weeks after the procedure. Your doctor will give you a prescription for one or more pain medications. In general, there are two types of medicines that can provide relief.      Non-steroidal anti-inflammatory drugs (NSAIDS). This class of drugs includes ibuprophen and naproxen. Your doctor may give you a prescription for these, or you can buy the identical drugs in smaller size tablets, over the counter. Brand names include: Advil or Aleve. In general, it is best to take these drugs on a regular basis following your surgery. Some patients cannot take these drugs, either because they cause stomach upset or if the individual has a history of ulcers or gastritis. The drugs are best taken with food. The maximum dose of ibuprofen is 800 milligrams 3 times per dayOR 600 milligrams 4 times per day. The maximum does of naproxen is 500 milligrams 2 times per day. Your doctor may give you a prescription for one or the other of these drugs, or you can buy them at your drugstore.    Narcotic pain relievers. These include Vicodin, Percocet, and Tylenol number 3. These are all prescription medications. These should be used as prescribed and as needed. Because these drugs have side effects (including constipation), you should reduce your use of these medications as tolerated as your pain improves.    In general, the best strategy is to take (if you are able to tolerate it) an NSAID medication on a regular basis until your  pain has largely gone away. You can take the narcotic pain medicine in addition to the NSAID medicine. Most patients use the full dose of the NSAID medication and narcotic pain reliever for the first few days after their operation. As your pain begins to lessen, you should cut back on your narcotic use while continuing to take your regular NSAID medication.    Some patients find it easiest to transition away from narcotic drugs by also taking acetaminophen (Tylenol). However, it is important to realize that most narcotic pain relievers also have acetaminophen, and excessive doses of acetaminophen can be dangerous.Accordingly, you can substitute 1000 milligrams of acetaminophen (two Extra Strength Tylenol or similar generic) for any given dose of narcotic, but acetominophen should not be taken in addition to a full narcotic dose that contains acetaminophen. The maximum acceptable dose of acetaminophen is 4000 milligrams.    Refilling prescriptions. If you need additional pain medication, please call the triage nurse at [phone] during normal business hours (8 a.m. to 4 p.m., Monday though Friday) or have your pharmacy fax a refill request to [phone]. If you call after hours or on the weekends, the doctor on call may not know you personally and may not renew narcotic pain medication by phone. Call your primary care provider for all other medication refills.     Bowel function and Perineal care:  Bowel movements can be very painful. It is important to have regular bowel movements at least every other day and to keep your stool soft. Your doctor may tell you to take a stool softener, such as Colace, once or twice a day. Try to avoid constipation and/or diarrhea as this can make the pain and bleeding worse. A high fiber diet, including at least four servings of fruits or vegetables daily, will help to keep your bowel movements regular and soft. If you have trouble getting enough fiber in your daily diet, a fiber  supplement can be considered, including Metamucil, Benefiber, or Citrucel, and can be bought at your local grocery store or pharmacy. It is important to drink six to eight glasses of water or juice everyday when using fiber products.    You can expect to have some bleeding after bowel movements, but it should stop soon after you wipe. Use a wet cloth or perianal pad (Tucks or Preparation H pads) to gently wipe the area after each bowel movement. Do not rub the anal area or use a lot of pressure. Using a spray bottle filled with warm water helps loosen any remaining stool. Blot gently with a soft dry cloth or tissue paper.    If possible, take a tub bath immediately after each bowel movement. Baths should be take at least 3 times daily for the first week to 10 days following your procedure. You should soak in the tub for 10 to 15 minutes each time with water as warm as you can tolerate. Even after you go back to work, it is a good idea to sit in the tub in the morning, after returning from work, and again in the evening before bedtime.    Constipation will cause you to strain when you have a bowel movement. The hard stool will be difficult to pass, will increase pain and bleeding, and will slow down healing. If you have not had a bowel movement within 2 days, take 2 teaspoons of Milk of Magnesia in the morning. If there are no results, repeat this. Stop taking Milk of Magnesia or other laxatives if you begin to have diarrhea.    Diarrhea can occur if too much laxative is taken or for several other reasons. If you have 3 or more loose, watery stools in a 24-hour period, stop taking laxatives. Continue to eat a high fiber diet and to take bulk-forming agents such as Metamucil, Benefiber, or Citrucel. You may take Immodium as directed on the package but please call the triage nurse, [phone], if this was not discussed with you surgeon preoperatively.    If you are still having diarrhea or constipation after you have  tried these recommendations, please call the triage nurse line, [phone].    Bleeding/Infection:  Infection around the anal opening is not very common. The anal area has excellent blood supply, which helps the area to heal. Bloody discharge after bowel movements is normal and may last 2 to 4 weeks after your surgery. However, if you bleed between bowel movements and cannot get it to stop, call the triage nurse immediately [phone].    Activity:  After your procedure, there are no restrictions on your activity except restrictions surrounding being on narcotics and in pain, such as no heavy machine operating or driving. You may walk, climb stairs, ride in a car, and sit as tolerated. It is helpful to avoid sitting in one position for long periods (2 or more hours). After some surgeries, you may be told not to perform any lifting (more than 10 pounds) for several weeks after surgery.    When do I need to call the doctor or triage nurse?  If you experience any of the problems listed here, call our triage nurse during business hours ([phone]). The nurse will help you with your problem or have the doctor call you. After hours and on weekends, please call the main hospital number ([phone]) and ask for the colon and rectal surgery person on call. Some is available to help you 24 hours a day, seven days a week.    Fever greater than 101 degrees    Chills    Foul-smelling drainage    Nausea and vomiting    Diarrhea - greater than 3 water stools in 24 hours    Constipation - no bowel movement after 3 days    Severe bleeding that does not stop soon after a bowel movement    Problems with the incision, including increased pain, swelling, or redness    [facility]  Home Care Following Anesthesia  For 24 hours after surgery:  1. Get plenty of rest.  A responsible adult must stay with you for at least 24 hours after you leave the surgery center.  2. Do not drive or use heavy  equipment.  If you have weakness or tingling, don't drive or use heavy equipment until this feeling goes away.   3. Do not drink alcohol.   4. Avoid strenuous or risky activities.  Ask for help when climbing stairs.  5. You may feel lightheaded.  IF so, sit for a few minutes before standing.  Have someone help you get up.   6. If you have nausea (feel sick to your stomach): Drink only clear liquids such as apple juice, ginger ale, broth or 7-Up.  Rest may also help.  Be sure to drink enough fluids.  Move to a regular diet as you feel able.   7. You may have a slight fever.  Call the doctor if your fever is over 100 F (37.7 C) (taken under the tongue) or lasts longer than 24 hours.  8. You may have a dry mouth, a sore throat, muscle aches or trouble sleeping. These should go away after 24 hours.  9. Do not make important or legal decisions.               Tips for taking pain medications  To get the best pain relief possible, remember these points:    Take pain medications as directed, before pain becomes severe.    Pain medication can upset your stomach: taking it with food may help.    Constipation is a common side effect of pain medication. Drink plenty of  fluids.    Eat foods high in fiber. Take a stool softener if recommended by your doctor or pharmacist.    Do not drink alcohol, drive or operate machinery while taking pain medications.    Ask about other ways to control pain, such as with heat, ice or relaxation.    Tylenol/Acetaminophen Consumption  To help encourage the safe use of acetaminophen, the makers of TYLENOL  have lowered the maximum daily dose for single-ingredient Extra Strength TYLENOL  (acetaminophen) products sold in the [place] from 8 pills per day (4,000 mg) to 6 pills per day (3,000 mg). The dosing interval has also changed from 2 pills every 4-6 hours to 2 pills every 6 hours.    If you feel your pain relief is insufficient, you may take Tylenol/Acetaminophen in addition to your narcotic pain  medication.     Be careful not to exceed 3,000 mg of Tylenol/Acetaminophen in a 24 hour period from all sources.    If you are taking extra strength Tylenol/acetaminophen (500 mg), the maximum dose is 6 tablets in 24 hours.    If you are taking regular strength acetaminophen (325 mg), the maximum dose is 9 tablets in 24 hours.    Call a doctor for any of the followin. Signs of infection (fever, growing tenderness at the surgery site, a large amount of drainage or bleeding, severe pain, foul-smelling drainage, redness, swelling).  2. It has been over 8 to 10 hours since surgery and you are still not able to urinate (pass water).  3. Headache for over 24 hours.  Your doctor is:       Dr. Camila Calderon, Colon Rectal: [phone]               Or dial [phone] and ask for the resident on call for:  Colon Rectal  For emergency care, call the:  [facility]:  [phone] (TTY for hearing impaired: [phone])

## 2020-01-10 NOTE — ANESTHESIA CARE TRANSFER NOTE
Patient: Tremayne Galarza    Procedure(s):  EXAM UNDER ANESTHESIA, ANUS, inscision and drainage of perirectal abscess, partial fistulotomy,  seton placement  partial FISTULOTOMY, RECTUM  PLACEMENT, SETON STITCH    Diagnosis: Perianal abscess [K61.0]  Anal fistula [K60.3]  Diagnosis Additional Information: No value filed.    Anesthesia Type:   MAC     Note:  Airway :Room Air  Patient transferred to:Phase II  Handoff Report: Identifed the Patient, Identified the Reponsible Provider, Reviewed the pertinent medical history, Discussed the surgical course, Reviewed Intra-OP anesthesia mangement and issues during anesthesia, Set expectations for post-procedure period and Allowed opportunity for questions and acknowledgement of understanding      Vitals: (Last set prior to Anesthesia Care Transfer)    CRNA VITALS  1/10/2020 0723 - 1/10/2020 0758      1/10/2020             Resp Rate (set):  10                Electronically Signed By: SYLVIA Renee CRNA  January 10, 2020  7:58 AM

## 2020-01-10 NOTE — PROGRESS NOTES
Colon and Rectal Surgery Follow-up Clinic Note    Referring provider:  Mee Park, APRN CNP  [id number]  [place] [address]       RE: Tremayne Galarza  : 1960  STEFANIA: 2020      Tremayne Galarza is a very pleasant 59 year old male with a recent diagnosis of a perirectal abscess drained almost 2 weeks ago in clinic now with recurrent pain.      HISTORY OF PRESENT ILNESS:     Presented with perianal/rectal pain for about 7-10 days.     2019 - seen in primary care clinic and a CT scan was obtained.     CT scan 2019 - This demonstrated a perirectal abscess. Abscess is posterior, slightly to the right, about 2 cm deep to the skin, largest in diameter about 3.5 cm    Placed on antibiotics and referred to colorectal.     Patient contacted me directly 2019 and he was seen in clinic and the abscess was drained.     Colonoscopy is up to date. Most recent one was without polyps but he did have polyps in the past.    Denies a history of colitis or Crohn's disease    Interim History: The patient initially did well. He was doing soaking bathes and drainage stopped. But unfortunately began to have pressure and pain again as well as drainage. No longer on antibiotics. No fevers.       PLEASE SEE NOTE BELOW FOR PHYSICAL EXAMINATION, REVIEW OF SYSTEMS, AND OTHER HISTORY.      Assessment:  Perirectal abscess, drained today in clinic. Appears to have a fistula with fluid connecting from the abscess pocket into the rectum.    Plan:     Incision and drainage of abscess today with irrigation of abscess. And demonstration of the fistula.    Examination under anesthesia with likely seton.     He understands that this is a perirectal fistula and a seton will likely be needed and additional treatment.     Total time was 15 minutes, over 50% was spent counseling the patient.     PLEASE SEE NOTE BELOW FOR PHYSICAL EXAMINATION, REVIEW OF SYSTEMS, AND OTHER HISTORY.    Camila Calderon MD,  MD  Colon and Rectal Surgery Attending  Department of Surgery  [facility]      -------------------------------------------------------------------------------------------------------------------          Medical history:  Past Medical History:   Diagnosis Date     Chronic ischemic heart disease 9/4/2014    Overview:  3 stents to RCA 8/2014      Essential hypertension 2/11/2005    Overview:  LW Onset:  [id number]      Hyperlipidemia 11/14/2005    Overview:  LW Onset:  14Nov05        Surgical history:  Past Surgical History:   Procedure Laterality Date     BIOPSY       CARDIAC SURGERY       COLONOSCOPY N/A 12/30/2015    Procedure: COLONOSCOPY;  Surgeon: Duane, William Charles, MD;  Location: [facility] OR     COLONOSCOPY WITH CO2 INSUFFLATION N/A 12/30/2015    Procedure: COLONOSCOPY WITH CO2 INSUFFLATION;  Surgeon: Duane, William Charles, MD;  Location: [facility] OR     EXAM UNDER ANESTHESIA ANUS N/A 1/10/2020    Procedure: EXAM UNDER ANESTHESIA, ANUS, inscision and drainage of perirectal abscess, partial fistulotomy,  seton placement;  Surgeon: Camila Calderon MD;  Location: [facility] OR     FISTULOTOMY RECTUM N/A 1/10/2020    Procedure: partial FISTULOTOMY, RECTUM;  Surgeon: Camila Calderon MD;  Location: [facility] OR     PLACEMENT OF SETON RECTUM N/A 1/10/2020    Procedure: PLACEMENT, SETON STITCH;  Surgeon: Camila Calderon MD;  Location: [facility] OR       Problem list:    Patient Active Problem List    Diagnosis Date Noted     Perianal abscess 01/09/2020     Priority: Medium     Added automatically from request for surgery [id number]       Anal fistula 01/09/2020     Priority: Medium     Added automatically from request for surgery [id number]       Verruca plantaris 04/11/2018     Priority: Medium     LUQ abdominal pain 03/27/2018     Priority: Medium     CAD (coronary artery disease) 08/05/2015     Priority: Medium     Status post coronary angiogram 03/26/2015     Priority: Medium      Nondependent alcohol abuse 02/03/2015     Priority: Medium     Chronic ischemic heart disease 09/04/2014     Priority: Medium     Overview:   3 stents to RCA 8/2014       Postsurgical percutaneous transluminal coronary angioplasty status 09/04/2014     Priority: Medium     Benign neoplasm of colon 06/05/2013     Priority: Medium     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 Provider to review and confirm.       HTN, goal below 140/90 02/11/2005     Priority: Medium     Overview:   LW Onset:  80Chi93       Obesity 02/11/2005     Priority: Medium     Overview:   LW Onset:  11Feb05       Allergic rhinitis 02/11/2005     Priority: Medium     Overview:   LW Onset:  11Feb05         Medications:  Current Outpatient Medications   Medication Sig Dispense Refill     ciprofloxacin (CIPRO) 500 MG tablet Take 1 tablet (500 mg) by mouth 2 times daily 14 tablet 0     metroNIDAZOLE (FLAGYL) 500 MG tablet Take 1 tablet (500 mg) by mouth 2 times daily 14 tablet 0     aspirin 325 MG tablet Take 1 tablet (325 mg) by mouth daily (Patient taking differently: Take 81 mg by mouth daily ) 90 tablet 3     atenolol (TENORMIN) 25 MG tablet TAKE ONE TABLET BY MOUTH EVERY DAY 30 tablet 4     ezetimibe (ZETIA) 10 MG tablet Take 1 tablet (10 mg) by mouth daily 90 tablet 3     fexofenadine (ALLEGRA) 60 MG tablet Take 0.5 tablets (30 mg) by mouth daily 60 tablet 3     rosuvastatin (CRESTOR) 40 MG tablet TAKE ONE TABLET BY MOUTH EVERY DAY 90 tablet 3     sildenafil (VIAGRA) 50 MG tablet as needed for ED         Allergies:  No Known Allergies    Family history:  Family History   Problem Relation Age of Onset     Coronary Artery Disease Mother      Hypertension Mother      Cerebrovascular Disease Mother        Social history:  Social History     Socioeconomic History     Marital  status:      Spouse name: Not on file     Number of children: Not on file     Years of education: Not on file     Highest education level: Not on file   Occupational History     Not on file   Social Needs     Financial resource strain: Not on file     Food insecurity:     Worry: Not on file     Inability: Not on file     Transportation needs:     Medical: Not on file     Non-medical: Not on file   Tobacco Use     Smoking status: Never Smoker     Smokeless tobacco: Never Used   Substance and Sexual Activity     Alcohol use: Yes     Drug use: No     Sexual activity: Yes     Partners: Female   Lifestyle     Physical activity:     Days per week: Not on file     Minutes per session: Not on file     Stress: Not on file   Relationships     Social connections:     Talks on phone: Not on file     Gets together: Not on file     Attends [employer] service: Not on file     Active member of club or organization: Not on file     Attends meetings of clubs or organizations: Not on file     Relationship status: Not on file     Intimate partner violence:     Fear of current or ex partner: Not on file     Emotionally abused: Not on file     Physically abused: Not on file     Forced sexual activity: Not on file   Other Topics Concern     Parent/sibling w/ CABG, MI or angioplasty before 65F 55M? Not Asked   Social History Narrative     Not on file         Nursing Notes:   Eliezer Childers, EMT  1/8/2020  6:56 PM  Signed  Clinic Administered Medication Documentation    MEDICATION LIST:   Injectable Medication Documentation    Patient was given lidocaine with epi 1;100,000. Prior to medication administration, verified patients identity using patient s name and date of birth. Please see MAR and medication order for additional information. Patient instructed to remain in clinic for 15 minutes and report any adverse reaction to staff immediately .      Was entire vial of medication used? No, The remainder 17ML of 20ML was discarded as  unavoidable waste.  Vial/Syringe: Multi dose vial  Expiration Date:  08/21  Was this medication supplied by the patient? No     RAMAN Tolentino  Colon and Rectal Surgery         Physical Examination:  BP (!) 140/93 (BP Location: Left arm, Patient Position: Sitting, Cuff Size: Adult Regular)   Pulse 79   Ht 1.829 m (6')   Wt 97.5 kg (215 lb)   SpO2 95%   BMI 29.16 kg/m    General: Pleasant, mostly no acute distress  But pain with walking.    Perianal external examination:  Perianal skin: Intact with no excoriation or lichenification.  Lesions: No evidence of an external lesion, nodularity, or induration in the perianal region with an area of faint induration in the left posterior perianal skin.  Eversion of buttocks: not performed because of discomfort but nothing obvious  Skin tags or external hemorrhoids: None.  Digital rectal examination: Was deferred.    Anoscopy: Was deferred.      Procedures:  After informed consent was obtained, an incision and drainage was performed (Wilman and assisted by Juliette Giles NP). The area was prepped with betadine and 1% lidocaine applied in the subcutaneous area near the indurated right posterior perianal skin. A cruciate incision was made over the previous abscess drainage site. The area drained about 5 ml. No loculated areas were found. The area was irrigated with 10 mL X 2. A small amount of saline was seen out of the anus. The patient tolerated this well and there was good hemostasis.

## 2020-01-10 NOTE — ANESTHESIA PREPROCEDURE EVALUATION
Anesthesia Pre-Procedure Evaluation    Patient: Tremayne Galarza   MRN:     [id number] Gender:   male   Age:    59 year old :      1960        Preoperative Diagnosis: Perianal abscess [K61.0]  Anal fistula [K60.3]   Procedure(s):  EXAM UNDER ANESTHESIA, ANUS, inscision and drainage of perirectal abscess, possible fistulotomy, possible seton placement  FISTULOTOMY, RECTUM  PLACEMENT, SETON STITCH     Past Medical History:   Diagnosis Date     Chronic ischemic heart disease 2014    Overview:  3 stents to RCA 2014      Essential hypertension 2005    Overview:  LW Onset:  [id number]      Hyperlipidemia 2005    Overview:  LW Onset:         Past Surgical History:   Procedure Laterality Date     BIOPSY       CARDIAC SURGERY       COLONOSCOPY N/A 2015    Procedure: COLONOSCOPY;  Surgeon: Duane, William Charles, MD;  Location: [facility]     COLONOSCOPY WITH CO2 INSUFFLATION N/A 2015    Procedure: COLONOSCOPY WITH CO2 INSUFFLATION;  Surgeon: Duane, William Charles, MD;  Location: [facility]          Anesthesia Evaluation     . Pt has had prior anesthetic.     No history of anesthetic complications          ROS/MED HX    ENT/Pulmonary:  - neg pulmonary ROS    (-) tobacco use   Neurologic:  - neg neurologic ROS     Cardiovascular:     (+) Dyslipidemia, hypertension--CAD, --stent,. : . . . :. . No previous cardiac testing       METS/Exercise Tolerance:  >4 METS   Hematologic:  - neg hematologic  ROS       Musculoskeletal:  - neg musculoskeletal ROS       GI/Hepatic:     (+) Other GI/Hepatic Perianal abscess      Renal/Genitourinary:  - ROS Renal section negative       Endo:  - neg endo ROS       Psychiatric:  - neg psychiatric ROS       Infectious Disease:  - neg infectious disease ROS       Malignancy:      - no malignancy   Other:    - neg other ROS                     PHYSICAL EXAM:   Mental Status/Neuro: A/A/O   Airway: Facies: Feasible  Mallampati: I  Mouth/Opening: Full  TM distance: > 6  cm  Neck ROM: Full   Respiratory: Auscultation: CTAB     Resp. Rate: Normal     Resp. Effort: Normal      CV: Rhythm: Regular  Rate: Age appropriate  Heart: Normal Sounds  Edema: None   Comments:      Dental: Normal Dentition                LABS:  CBC:   Lab Results   Component Value Date    WBC 8.6 12/23/2019    WBC 6.4 03/27/2018    HGB 14.7 12/23/2019    HGB 14.0 03/27/2018    HCT 44.0 12/23/2019    HCT 41.5 03/27/2018     12/23/2019     (L) 03/27/2018     BMP:   Lab Results   Component Value Date     12/23/2019     03/27/2018    POTASSIUM 3.9 12/23/2019    POTASSIUM 4.1 03/27/2018    CHLORIDE 106 12/23/2019    CHLORIDE 104 03/27/2018    CO2 30 12/23/2019    CO2 27 03/27/2018    BUN 20 12/23/2019    BUN 16 03/27/2018    CR 0.93 12/23/2019    CR 0.83 03/27/2018     (H) 12/23/2019    GLC 91 03/27/2018     COAGS:   Lab Results   Component Value Date    INR 1.0 08/21/2014     POC: No results found for: BGM, HCG, HCGS  OTHER:   Lab Results   Component Value Date    ARMAND 9.4 12/23/2019    ALBUMIN 4.0 12/23/2019    PROTTOTAL 7.8 12/23/2019    ALT 36 12/23/2019    AST 25 12/23/2019    ALKPHOS 64 12/23/2019    BILITOTAL 0.6 12/23/2019    LIPASE 236 03/27/2018    TSH 1.78 02/06/2015        Preop Vitals    BP Readings from Last 3 Encounters:   01/10/20 137/82   01/08/20 (!) 140/93   12/26/19 (!) 148/88    Pulse Readings from Last 3 Encounters:   01/10/20 54   01/08/20 79   12/26/19 82      Resp Readings from Last 3 Encounters:   01/10/20 16   12/30/15 16   03/26/15 16    SpO2 Readings from Last 3 Encounters:   01/10/20 96%   01/08/20 95%   12/26/19 94%      Temp Readings from Last 1 Encounters:   01/10/20 36.8  C (98.3  F) (Oral)    Ht Readings from Last 1 Encounters:   01/10/20 1.829 m (6')      Wt Readings from Last 1 Encounters:   01/10/20 97.5 kg (215 lb)    Estimated body mass index is 29.16 kg/m  as calculated from the following:    Height as of this encounter: 1.829 m (6').    Weight  as of this encounter: 97.5 kg (215 lb).     LDA:  Right Radial Interventional Cardiac Procedure Access (Active)   Number of days: 1751        Assessment:   ASA SCORE: 3    H&P: History and physical reviewed and following examination; no interval change.   Smoking Status:  Non-Smoker/Unknown   NPO Status: NPO Appropriate     Plan:   Anes. Type:  MAC   Pre-Medication: Acetaminophen; Gabapentin   Induction:  N/a   Airway: Native Airway   Access/Monitoring: PIV   Maintenance: N/a     Postop Plan:   Postop Pain: None  Postop Sedation/Airway: Not planned  Disposition: Outpatient     PONV Management:   Adult Risk Factors:, Non-Smoker   Prevention: Ondansetron     CONSENT: Direct conversation   Plan and risks discussed with: Patient   Blood Products: Consent Deferred (Minimal Blood Loss)                   Zain Soler DO

## 2020-01-10 NOTE — ANESTHESIA POSTPROCEDURE EVALUATION
Anesthesia POST Procedure Evaluation    Patient: Tremayne Galarza   MRN:     [id number] Gender:   male   Age:    59 year old :      1960        Preoperative Diagnosis: Perianal abscess [K61.0]  Anal fistula [K60.3]   Procedure(s):  EXAM UNDER ANESTHESIA, ANUS, inscision and drainage of perirectal abscess, partial fistulotomy,  seton placement  partial FISTULOTOMY, RECTUM  PLACEMENT, SETON STITCH   Postop Comments: No value filed.       Anesthesia Type:  Not documented  MAC    Reportable Event: NO     PAIN: Uncomplicated   Sign Out status: Comfortable, Well controlled pain     PONV: No PONV   Sign Out status:  No Nausea or Vomiting     Neuro/Psych: Uneventful perioperative course   Sign Out Status: Preoperative baseline; Age appropriate mentation     Airway/Resp.: Uneventful perioperative course   Sign Out Status: Non labored breathing, age appropriate RR; Resp. Status within EXPECTED Parameters     CV: Uneventful perioperative course   Sign Out status: Appropriate BP and perfusion indices; Appropriate HR/Rhythm     Disposition:   Sign Out in:  PACU  Disposition:  Phase II; Home  Recovery Course: Uneventful  Follow-Up: Not required           Last Anesthesia Record Vitals:  CRNA VITALS  1/10/2020 0723 - 1/10/2020 0823      1/10/2020             Resp Rate (set):  10          Last PACU Vitals:  Vitals Value Taken Time   /91 1/10/2020  7:56 AM   Temp 36.8  C (98.3  F) 1/10/2020  7:56 AM   Pulse     Resp 12 1/10/2020  7:56 AM   SpO2 92 % 1/10/2020  7:56 AM   Temp src Skin 1/10/2020  7:45 AM   NIBP 110/77 1/10/2020  7:49 AM   Pulse 68 1/10/2020  7:49 AM   SpO2 98 % 1/10/2020  7:49 AM   Resp     Temp     Ht Rate 67 1/10/2020  7:49 AM   Temp 2           Electronically Signed By: Zain Soler DO, January 10, 2020, 9:23 AM

## 2020-01-12 DIAGNOSIS — I25.10 CORONARY ARTERY DISEASE INVOLVING NATIVE CORONARY ARTERY OF NATIVE HEART WITHOUT ANGINA PECTORIS: ICD-10-CM

## 2020-01-14 RX ORDER — ATENOLOL 25 MG/1
TABLET ORAL
Qty: 30 TABLET | Refills: 4 | Status: SHIPPED | OUTPATIENT
Start: 2020-01-14 | End: 2020-04-06

## 2020-01-17 ENCOUNTER — OFFICE VISIT (OUTPATIENT)
Dept: SURGERY | Facility: CLINIC | Age: 60
End: 2020-01-17
Payer: COMMERCIAL

## 2020-01-17 VITALS
BODY MASS INDEX: 28.99 KG/M2 | OXYGEN SATURATION: 96 % | DIASTOLIC BLOOD PRESSURE: 73 MMHG | WEIGHT: 214 LBS | HEART RATE: 53 BPM | SYSTOLIC BLOOD PRESSURE: 132 MMHG | HEIGHT: 72 IN

## 2020-01-17 DIAGNOSIS — Z09 FOLLOW-UP EXAMINATION FOLLOWING SURGERY: Primary | ICD-10-CM

## 2020-01-17 DIAGNOSIS — K60.30 ANAL FISTULA: ICD-10-CM

## 2020-01-17 ASSESSMENT — PAIN SCALES - GENERAL: PAINLEVEL: NO PAIN (0)

## 2020-01-17 ASSESSMENT — MIFFLIN-ST. JEOR: SCORE: 1833.7

## 2020-01-17 NOTE — PROGRESS NOTES
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    Interval history: Tremayne has been doing well.  He continues to have drainage and is taking warm tub baths several times a day.  A small amount of bleeding.  That discomfort but no significant pain.  No difficulty with bowel movements and no difficulty holding bowel movements.    Physical Examination: Exam was chaperoned by Eliezer Childers EMT  /73 (BP Location: Left arm, Patient Position: Sitting, Cuff Size: Adult Regular)   Pulse 53   Ht 6'   Wt 214 lb   SpO2 96%   BMI 29.02 kg/m    General: Alert, oriented, in no acute distress, sitting comfortably  HEENT: Mucous membranes moist  Perianal external examination:  Fistulotomy site in the posterior midline remains open with good granulation tissue and without any erythema.  A small amount of purulent drainage is present.  No induration.  Yellow vessel loop seton drain present in the posterior position with ties external to the tract.    Digital rectal examination: Was deferred.    Anoscopy: Was deferred.    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  He is doing well.  Fistulotomy site is healing well and seton remains in place.  Discussed with Dr. Calderon and once the fistulotomy site has healed to the level of the seton and drainage has decreased, we will have him start tugging on the seton and then return to see her to discuss possible completion fistulotomy.  I would like to see him back in 1 month for a recheck.  Encouraged him to contact  the clinic in the meantime with any questions or concerns. Patient's questions were answered to his stated satisfaction and he is in agreement with this plan.    Medical history:  Past Medical History:   Diagnosis Date     Chronic ischemic heart disease 9/4/2014    Overview:  3 stents to RCA 8/2014      Essential hypertension 2/11/2005    Overview:  LW Onset:  [id number]      Hyperlipidemia 11/14/2005    Overview:  LW Onset:  14Nov05        Surgical history:  Past Surgical History:   Procedure Laterality Date     BIOPSY       CARDIAC SURGERY       COLONOSCOPY N/A 12/30/2015    Procedure: COLONOSCOPY;  Surgeon: Duane, William Charles, MD;  Location: [facility]     COLONOSCOPY WITH CO2 INSUFFLATION N/A 12/30/2015    Procedure: COLONOSCOPY WITH CO2 INSUFFLATION;  Surgeon: Duane, William Charles, MD;  Location: [facility]     EXAM UNDER ANESTHESIA ANUS N/A 1/10/2020    Procedure: EXAM UNDER ANESTHESIA, ANUS, inscision and drainage of perirectal abscess, partial fistulotomy,  seton placement;  Surgeon: Camila Calderon MD;  Location[facility]     FISTULOTOMY RECTUM N/A 1/10/2020    Procedure: partial FISTULOTOMY, RECTUM;  Surgeon: Camila Calderon MD;  Location: [facility]     PLACEMENT OF SETON RECTUM N/A 1/10/2020    Procedure: PLACEMENT, SETON STITCH;  Surgeon: Camila Calderon MD;  Location: [facility]       Problem list:  Patient Active Problem List    Diagnosis Date Noted     Perianal abscess 01/09/2020     Priority: Medium     Added automatically from request for surgery [id number]       Anal fistula 01/09/2020     Priority: Medium     Added automatically from request for surgery [id number]       Verruca plantaris 04/11/2018     Priority: Medium     LUQ abdominal pain 03/27/2018     Priority: Medium     CAD (coronary artery disease) 08/05/2015     Priority: Medium     Status post coronary angiogram 03/26/2015     Priority: Medium     Nondependent alcohol abuse 02/03/2015     Priority: Medium     Chronic ischemic  heart disease 09/04/2014     Priority: Medium     Overview:   3 stents to RCA 8/2014       Postsurgical percutaneous transluminal coronary angioplasty status 09/04/2014     Priority: Medium     Benign neoplasm of colon 06/05/2013     Priority: Medium     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 Provider to review and confirm.       HTN, goal below 140/90 02/11/2005     Priority: Medium     Overview:   LW Onset:  11Feb05       Obesity 02/11/2005     Priority: Medium     Overview:   LW Onset:  11Feb05       Allergic rhinitis 02/11/2005     Priority: Medium     Overview:   LW Onset:  11Feb05         Medications:  Current Outpatient Medications   Medication Sig Dispense Refill     aspirin 325 MG tablet Take 1 tablet (325 mg) by mouth daily (Patient taking differently: Take 81 mg by mouth daily ) 90 tablet 3     atenolol (TENORMIN) 25 MG tablet TAKE ONE TABLET BY MOUTH EVERY DAY 30 tablet 4     ciprofloxacin (CIPRO) 500 MG tablet Take 1 tablet (500 mg) by mouth 2 times daily 14 tablet 0     ezetimibe (ZETIA) 10 MG tablet Take 1 tablet (10 mg) by mouth daily 90 tablet 3     fexofenadine (ALLEGRA) 60 MG tablet Take 0.5 tablets (30 mg) by mouth daily 60 tablet 3     metroNIDAZOLE (FLAGYL) 500 MG tablet Take 1 tablet (500 mg) by mouth 2 times daily 14 tablet 0     rosuvastatin (CRESTOR) 40 MG tablet TAKE ONE TABLET BY MOUTH EVERY DAY 90 tablet 3     sildenafil (VIAGRA) 50 MG tablet as needed for ED         Allergies:  No Known Allergies    Family history:  Family History   Problem Relation Age of Onset     Coronary Artery Disease Mother      Hypertension Mother      Cerebrovascular Disease Mother        Social history:  Social History     Tobacco Use     Smoking status: Never Smoker     Smokeless tobacco: Never Used   Substance Use Topics     Alcohol use:  Yes     Marital status: .    Nursing Notes:   Eliezer Childers EMT  1/17/2020  9:23 AM  Signed  Chief Complaint   Patient presents with     Surgical Followup     Date of surgery 1/10/2020.       Vitals:    01/17/20 0921   BP: 132/73   BP Location: Left arm   Patient Position: Sitting   Cuff Size: Adult Regular   Pulse: 53   SpO2: 96%   Weight: 214 lb   Height: 6'       Body mass index is 29.02 kg/m .      RAMAN Tolentino                       Total face to face time was 10 minutes, >50% counseling.  This is a postop visit.    SYLVIA Tai, NP-C  Colon and Rectal Surgery  [facility]    This note was created using speech recognition software and may contain unintended word substitutions.

## 2020-01-17 NOTE — NURSING NOTE
Chief Complaint   Patient presents with     Surgical Followup     Date of surgery 1/10/2020.       Vitals:    01/17/20 0921   BP: 132/73   BP Location: Left arm   Patient Position: Sitting   Cuff Size: Adult Regular   Pulse: 53   SpO2: 96%   Weight: 214 lb   Height: 6'       Body mass index is 29.02 kg/m .      Eliezer Childers, EMT

## 2020-01-17 NOTE — LETTER
2020       RE: Tremayne Galarza  [address] [facility] [address]     Dear Colleague,    Thank you for referring your patient, Tremayne Galarza, to the [facility] COLON AND RECTAL SURGERY at [facility]. Please see a copy of my visit note below.    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    Interval history: Tremayne has been doing well.  He continues to have drainage and is taking warm tub baths several times a day.  A small amount of bleeding.  That discomfort but no significant pain.  No difficulty with bowel movements and no difficulty holding bowel movements.    Physical Examination: Exam was chaperoned by Eliezer Childers EMT  /73 (BP Location: Left arm, Patient Position: Sitting, Cuff Size: Adult Regular)   Pulse 53   Ht 6'   Wt 214 lb   SpO2 96%   BMI 29.02 kg/m     General: Alert, oriented, in no acute distress, sitting comfortably  HEENT: Mucous membranes moist  Perianal external examination:  Fistulotomy site in the posterior midline remains open with good granulation tissue and without any erythema.  A small amount of purulent drainage is present.  No induration.  Yellow vessel loop seton drain present in the posterior position with ties external to the tract.    Digital rectal examination: Was deferred.    Anoscopy: Was deferred.    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  He is doing well.  Fistulotomy site is healing well and seton remains in place.  Discussed with Dr. Calderon and  once the fistulotomy site has healed to the level of the seton and drainage has decreased, we will have him start tugging on the seton and then return to see her to discuss possible completion fistulotomy.  I would like to see him back in 1 month for a recheck.  Encouraged him to contact the clinic in the meantime with any questions or concerns. Patient's questions were answered to his stated satisfaction and he is in agreement with this plan.    Medical history:  Past Medical History:   Diagnosis Date     Chronic ischemic heart disease 9/4/2014    Overview:  3 stents to RCA 8/2014      Essential hypertension 2/11/2005    Overview:  LW Onset:  [id number]      Hyperlipidemia 11/14/2005    Overview:  LW Onset:  52Fvp90        Surgical history:  Past Surgical History:   Procedure Laterality Date     BIOPSY       CARDIAC SURGERY       COLONOSCOPY N/A 12/30/2015    Procedure: COLONOSCOPY;  Surgeon: Duane, William Charles, MD;  Location: [facility]     COLONOSCOPY WITH CO2 INSUFFLATION N/A 12/30/2015    Procedure: COLONOSCOPY WITH CO2 INSUFFLATION;  Surgeon: Duane, William Charles, MD;  Location: [facility]     EXAM UNDER ANESTHESIA ANUS N/A 1/10/2020    Procedure: EXAM UNDER ANESTHESIA, ANUS, inscision and drainage of perirectal abscess, partial fistulotomy,  seton placement;  Surgeon: Camila Calderon MD;  [facility]     FISTULOTOMY RECTUM N/A 1/10/2020    Procedure: partial FISTULOTOMY, RECTUM;  Surgeon: Camila Calderon MD;  [facility]: [facility]     PLACEMENT OF SETON RECTUM N/A 1/10/2020    Procedure: PLACEMENT, SETON STITCH;  Surgeon: Camila Calderon MD;  Location: [facility]       Problem list:  Patient Active Problem List    Diagnosis Date Noted     Perianal abscess 01/09/2020     Priority: Medium     Added automatically from request for surgery [id number]       Anal fistula 01/09/2020     Priority: Medium     Added automatically from request for surgery [id number]       Verruca plantaris 04/11/2018      Priority: Medium     LUQ abdominal pain 03/27/2018     Priority: Medium     CAD (coronary artery disease) 08/05/2015     Priority: Medium     Status post coronary angiogram 03/26/2015     Priority: Medium     Nondependent alcohol abuse 02/03/2015     Priority: Medium     Chronic ischemic heart disease 09/04/2014     Priority: Medium     Overview:   3 stents to RCA 8/2014       Postsurgical percutaneous transluminal coronary angioplasty status 09/04/2014     Priority: Medium     Benign neoplasm of colon 06/05/2013     Priority: Medium     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 Provider to review and confirm.       HTN, goal below 140/90 02/11/2005     Priority: Medium     Overview:   LW Onset:  11Feb05       Obesity 02/11/2005     Priority: Medium     Overview:   LW Onset:  11Feb05       Allergic rhinitis 02/11/2005     Priority: Medium     Overview:   LW Onset:  11Feb05         Medications:  Current Outpatient Medications   Medication Sig Dispense Refill     aspirin 325 MG tablet Take 1 tablet (325 mg) by mouth daily (Patient taking differently: Take 81 mg by mouth daily ) 90 tablet 3     atenolol (TENORMIN) 25 MG tablet TAKE ONE TABLET BY MOUTH EVERY DAY 30 tablet 4     ciprofloxacin (CIPRO) 500 MG tablet Take 1 tablet (500 mg) by mouth 2 times daily 14 tablet 0     ezetimibe (ZETIA) 10 MG tablet Take 1 tablet (10 mg) by mouth daily 90 tablet 3     fexofenadine (ALLEGRA) 60 MG tablet Take 0.5 tablets (30 mg) by mouth daily 60 tablet 3     metroNIDAZOLE (FLAGYL) 500 MG tablet Take 1 tablet (500 mg) by mouth 2 times daily 14 tablet 0     rosuvastatin (CRESTOR) 40 MG tablet TAKE ONE TABLET BY MOUTH EVERY DAY 90 tablet 3     sildenafil (VIAGRA) 50 MG tablet as needed for ED         Allergies:  No Known Allergies    Family history:  Family History    Problem Relation Age of Onset     Coronary Artery Disease Mother      Hypertension Mother      Cerebrovascular Disease Mother        Social history:  Social History     Tobacco Use     Smoking status: Never Smoker     Smokeless tobacco: Never Used   Substance Use Topics     Alcohol use: Yes     Marital status: .    Nursing Notes:   Eliezer Childers EMT  1/17/2020  9:23 AM  Signed  Chief Complaint   Patient presents with     Surgical Followup     Date of surgery 1/10/2020.       Vitals:    01/17/20 0921   BP: 132/73   BP Location: Left arm   Patient Position: Sitting   Cuff Size: Adult Regular   Pulse: 53   SpO2: 96%   Weight: 214 lb   Height: 6'       Body mass index is 29.02 kg/m .      RAMAN Tolentino        Total face to face time was 10 minutes, >50% counseling.  This is a postop visit.    SYLVIA Tai, NP-C  Colon and Rectal Surgery  [facility]    This note was created using speech recognition software and may contain unintended word substitutions.

## 2020-01-27 ENCOUNTER — OFFICE VISIT (OUTPATIENT)
Dept: SURGERY | Facility: CLINIC | Age: 60
End: 2020-01-27
Payer: COMMERCIAL

## 2020-01-27 VITALS
BODY MASS INDEX: 28.85 KG/M2 | DIASTOLIC BLOOD PRESSURE: 88 MMHG | SYSTOLIC BLOOD PRESSURE: 155 MMHG | WEIGHT: 213 LBS | HEIGHT: 72 IN | OXYGEN SATURATION: 97 %

## 2020-01-27 DIAGNOSIS — K61.0 PERIANAL ABSCESS: Primary | ICD-10-CM

## 2020-01-27 DIAGNOSIS — L02.31 ABSCESS OF RIGHT BUTTOCK: ICD-10-CM

## 2020-01-27 LAB
GRAM STN SPEC: ABNORMAL
SPECIMEN SOURCE: ABNORMAL
SPECIMEN SOURCE: ABNORMAL

## 2020-01-27 RX ORDER — SULFAMETHOXAZOLE/TRIMETHOPRIM 800-160 MG
1 TABLET ORAL 2 TIMES DAILY
Qty: 20 TABLET | Refills: 0 | Status: SHIPPED | OUTPATIENT
Start: 2020-01-27 | End: 2020-02-27

## 2020-01-27 RX ORDER — LIDOCAINE HYDROCHLORIDE AND EPINEPHRINE 10; 10 MG/ML; UG/ML
12 INJECTION, SOLUTION INFILTRATION; PERINEURAL ONCE
Status: DISCONTINUED | OUTPATIENT
Start: 2020-01-27 | End: 2020-06-19

## 2020-01-27 ASSESSMENT — MIFFLIN-ST. JEOR: SCORE: 1829.16

## 2020-01-27 ASSESSMENT — PAIN SCALES - GENERAL: PAINLEVEL: EXTREME PAIN (8)

## 2020-01-27 NOTE — PROGRESS NOTES
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 He presents today with concern for new abscesses.    Interval history: Tremayne has been doing well but 3 days ago developed what felt initially like a small pimple on his right hip and in his left posterior groin.  These have gotten progressively larger and more firm and more painful over the past 3 days.  He thinks there may be some drainage from the groin lesion but no drainage from the one near his right hip.  No fevers or chills.  He has never had any abscesses in the past.    Left anterior perianal lesion:       Right buttock lesion:              Physical Examination: Exam was chaperoned by Eliezer Childers, EMT  BP (!) 155/88 (BP Location: Left arm, Patient Position: Sitting, Cuff Size: Adult Regular)   Ht 6'   Wt 213 lb   SpO2 97%   BMI 28.89 kg/m    General: Alert, oriented, in no acute distress, sitting comfortably  HEENT: Mucous membranes moist  Perianal external examination:  Fistulotomy site in the posterior midline with yellow vessel loop seton in place and no erythema or drainage.  On his right buttock he has an area of induration with eschar appearing center and surrounding erythema.  In the left anterior perianal position there is an area of fluctuance, induration, and erythema with a small amount of purulent drainage present.  He additionally has a few small pustules on his buttocks. See photos above.    Digital rectal examination: Was deferred.    Anoscopy: Was deferred.    Assessment/Plan:  59 year old male status post inscision and drainage of perirectal abscess, partial fistulotomy, and seton placement on 1/10/2020 with Dr. Calderon for  intersphincteric fistula. He has developed two lesions, one on his right buttock and one in the left anterior perianal area. The right buttock lesion has an almost eschar appearing center. The left perianal lesion appears to have drained some on its own. Both with surrounding erythema. Discussed with Dr. Calderon who recommended draining any underlying abscesses, culturing both sites, and 3T MRI. I would favor a skin infection rather than both of these relating to his perianal fistula. Would consider possible MRSA. No underlying abscess on the right buttock lesion but this was unroofed and cultured. Erythema marked. Left perianal lesion with some purulent drainage removed but this had mostly drained on its own. Tracts toward the scrotum. Culture obtained at this site. Will get a 3T MRI and will start him on Bactrim and check CBC. Asked him to notify the clinic if he has any worsening pain or if erythema extends beyond marked site. Patient's questions were answered to his stated satisfaction and he is in agreement with this plan.    Medical history:  Past Medical History:   Diagnosis Date     Chronic ischemic heart disease 9/4/2014    Overview:  3 stents to RCA 8/2014      Essential hypertension 2/11/2005    Overview:  LW Onset:  77Qxn07      Hyperlipidemia 11/14/2005    Overview:  LW Onset:  20Cyq40        Surgical history:  Past Surgical History:   Procedure Laterality Date     BIOPSY       CARDIAC SURGERY       COLONOSCOPY N/A 12/30/2015    Procedure: COLONOSCOPY;  Surgeon: Duane, William Charles, MD;  Location: [facility]     COLONOSCOPY WITH CO2 INSUFFLATION N/A 12/30/2015    Procedure: COLONOSCOPY WITH CO2 INSUFFLATION;  Surgeon: Duane, William Charles, MD;  Location: [facility]     EXAM UNDER ANESTHESIA ANUS N/A 1/10/2020    Procedure: EXAM UNDER ANESTHESIA, ANUS, inscision and drainage of perirectal abscess, partial fistulotomy,  seton placement;  Surgeon: Camila Calderon MD;  Location: [facility]      FISTULOTOMY RECTUM N/A 1/10/2020    Procedure: partial FISTULOTOMY, RECTUM;  Surgeon: Camila Calderon MD;  [facility]     PLACEMENT OF SETON RECTUM N/A 1/10/2020    Procedure: PLACEMENT, SETON STITCH;  Surgeon: Camila Calderon MD;  Location: [facility]       Problem list:    Patient Active Problem List    Diagnosis Date Noted     Perianal abscess 01/09/2020     Priority: Medium     Added automatically from request for surgery [id number]       Anal fistula 01/09/2020     Priority: Medium     Added automatically from request for surgery [id number]       Verruca plantaris 04/11/2018     Priority: Medium     LUQ abdominal pain 03/27/2018     Priority: Medium     CAD (coronary artery disease) 08/05/2015     Priority: Medium     Status post coronary angiogram 03/26/2015     Priority: Medium     Nondependent alcohol abuse 02/03/2015     Priority: Medium     Chronic ischemic heart disease 09/04/2014     Priority: Medium     Overview:   3 stents to RCA 8/2014       Postsurgical percutaneous transluminal coronary angioplasty status 09/04/2014     Priority: Medium     Benign neoplasm of colon 06/05/2013     Priority: Medium     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 Provider to review and confirm.       HTN, goal below 140/90 02/11/2005     Priority: Medium     Overview:   LW Onset:  28Dws07       Obesity 02/11/2005     Priority: Medium     Overview:   LW Onset:  96Dgr59       Allergic rhinitis 02/11/2005     Priority: Medium     Overview:   LW Onset:  00Pcp88         Medications:  Current Outpatient Medications   Medication Sig Dispense Refill     sulfamethoxazole-trimethoprim (BACTRIM DS/SEPTRA DS) 800-160 MG tablet Take 1 tablet by mouth 2 times daily 20 tablet 0     aspirin 325 MG tablet Take 1 tablet (325 mg) by mouth daily (Patient  taking differently: Take 81 mg by mouth daily ) 90 tablet 3     atenolol (TENORMIN) 25 MG tablet TAKE ONE TABLET BY MOUTH EVERY DAY 30 tablet 4     ciprofloxacin (CIPRO) 500 MG tablet Take 1 tablet (500 mg) by mouth 2 times daily 14 tablet 0     ezetimibe (ZETIA) 10 MG tablet Take 1 tablet (10 mg) by mouth daily 90 tablet 3     fexofenadine (ALLEGRA) 60 MG tablet Take 0.5 tablets (30 mg) by mouth daily 60 tablet 3     metroNIDAZOLE (FLAGYL) 500 MG tablet Take 1 tablet (500 mg) by mouth 2 times daily 14 tablet 0     rosuvastatin (CRESTOR) 40 MG tablet TAKE ONE TABLET BY MOUTH EVERY DAY 90 tablet 3     sildenafil (VIAGRA) 50 MG tablet as needed for ED         Allergies:  No Known Allergies    Family history:  Family History   Problem Relation Age of Onset     Coronary Artery Disease Mother      Hypertension Mother      Cerebrovascular Disease Mother        Social history:  Social History     Tobacco Use     Smoking status: Never Smoker     Smokeless tobacco: Never Used   Substance Use Topics     Alcohol use: Yes     Marital status: .    There are no exam notes on file for this visit.   Total face to face time was 15 minutes, >50% counseling, in addition to the procedure time.      SYLVIA Tai, NP-C  Colon and Rectal Surgery  [facility]    This note was created using speech recognition software and may contain unintended word substitutions.

## 2020-01-27 NOTE — LETTER
2020       RE: Tremayne Galarza  [address] [address]     Dear Colleague,    Thank you for referring your patient, Tremayne Galarza, to the [facility] COLON AND RECTAL SURGERY at [facility]. Please see a copy of my visit note below.    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 He presents today with concern for new abscesses.    Interval history: Tremayne has been doing well but 3 days ago developed what felt initially like a small pimple on his right hip and in his left posterior groin.  These have gotten progressively larger and more firm and more painful over the past 3 days.  He thinks there may be some drainage from the groin lesion but no drainage from the one near his right hip.  No fevers or chills.  He has never had any abscesses in the past.    Left anterior perianal lesion:       Right buttock lesion:              Physical Examination: Exam was chaperoned by Eliezer Childers, EMT  BP (!) 155/88 (BP Location: Left arm, Patient Position: Sitting, Cuff Size: Adult Regular)   Ht 6'   Wt 213 lb   SpO2 97%   BMI 28.89 kg/m     General: Alert, oriented, in no acute distress, sitting comfortably  HEENT: Mucous membranes moist  Perianal external examination:  Fistulotomy site in the posterior midline with yellow vessel loop seton in place and no erythema or drainage.  On his right buttock he has an area of induration with eschar appearing center and surrounding erythema.  In the left anterior perianal position there is an area of fluctuance, induration, and erythema with a small amount of purulent drainage present.  He additionally has a few small  pustules on his buttocks. See photos above.    Digital rectal examination: Was deferred.    Anoscopy: Was deferred.    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 He has developed two lesions, one on his right buttock and one in the left anterior perianal area. The right buttock lesion has an almost eschar appearing center. The left perianal lesion appears to have drained some on its own. Both with surrounding erythema. Discussed with Dr. Calderon who recommended draining any underlying abscesses, culturing both sites, and 3T MRI. I would favor a skin infection rather than both of these relating to his perianal fistula. Would consider possible MRSA. No underlying abscess on the right buttock lesion but this was unroofed and cultured. Erythema marked. Left perianal lesion with some purulent drainage removed but this had mostly drained on its own. Tracts toward the scrotum. Culture obtained at this site. Will get a 3T MRI and will start him on Bactrim and check CBC. Asked him to notify the clinic if he has any worsening pain or if erythema extends beyond marked site. Patient's questions were answered to his stated satisfaction and he is in agreement with this plan.    Medical history:  Past Medical History:   Diagnosis Date     Chronic ischemic heart disease 9/4/2014    Overview:  3 stents to RCA 8/2014      Essential hypertension 2/11/2005    Overview:  LW Onset:  85Krg17      Hyperlipidemia 11/14/2005    Overview:  LW Onset:  75Ybf97        Surgical history:  Past Surgical History:   Procedure Laterality Date     BIOPSY       CARDIAC SURGERY       COLONOSCOPY N/A 12/30/2015    Procedure: COLONOSCOPY;  Surgeon: Duane, William Charles, MD;  Location: [facility]     COLONOSCOPY WITH CO2 INSUFFLATION N/A 12/30/2015    Procedure: COLONOSCOPY WITH CO2 INSUFFLATION;   Surgeon: Duane, William Charles, MD;  Location: [facility]     EXAM UNDER ANESTHESIA ANUS N/A 1/10/2020    Procedure: EXAM UNDER ANESTHESIA, ANUS, inscision and drainage of perirectal abscess, partial fistulotomy,  seton placement;  Surgeon: Camila Calderon MD;  [facility]     FISTULOTOMY RECTUM N/A 1/10/2020    Procedure: partial FISTULOTOMY, RECTUM;  Surgeon: Camila Calderon MD;  [facility]     PLACEMENT OF SETON RECTUM N/A 1/10/2020    Procedure: PLACEMENT, SETON STITCH;  Surgeon: Camila Calderon MD;  [facility]       Problem list:    Patient Active Problem List    Diagnosis Date Noted     Perianal abscess 01/09/2020     Priority: Medium     Added automatically from request for surgery [id number]       Anal fistula 01/09/2020     Priority: Medium     Added automatically from request for surgery [id number]       Verruca plantaris 04/11/2018     Priority: Medium     LUQ abdominal pain 03/27/2018     Priority: Medium     CAD (coronary artery disease) 08/05/2015     Priority: Medium     Status post coronary angiogram 03/26/2015     Priority: Medium     Nondependent alcohol abuse 02/03/2015     Priority: Medium     Chronic ischemic heart disease 09/04/2014     Priority: Medium     Overview:   3 stents to RCA 8/2014       Postsurgical percutaneous transluminal coronary angioplasty status 09/04/2014     Priority: Medium     Benign neoplasm of colon 06/05/2013     Priority: Medium     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 Provider to review and confirm.       HTN, goal below 140/90 02/11/2005     Priority: Medium     Overview:   LW Onset:  70Eit11       Obesity 02/11/2005     Priority: Medium     Overview:   LW Onset:  67Fsf48       Allergic rhinitis 02/11/2005     Priority: Medium     Overview:   LW Onset:   05Vhn14         Medications:  Current Outpatient Medications   Medication Sig Dispense Refill     sulfamethoxazole-trimethoprim (BACTRIM DS/SEPTRA DS) 800-160 MG tablet Take 1 tablet by mouth 2 times daily 20 tablet 0     aspirin 325 MG tablet Take 1 tablet (325 mg) by mouth daily (Patient taking differently: Take 81 mg by mouth daily ) 90 tablet 3     atenolol (TENORMIN) 25 MG tablet TAKE ONE TABLET BY MOUTH EVERY DAY 30 tablet 4     ciprofloxacin (CIPRO) 500 MG tablet Take 1 tablet (500 mg) by mouth 2 times daily 14 tablet 0     ezetimibe (ZETIA) 10 MG tablet Take 1 tablet (10 mg) by mouth daily 90 tablet 3     fexofenadine (ALLEGRA) 60 MG tablet Take 0.5 tablets (30 mg) by mouth daily 60 tablet 3     metroNIDAZOLE (FLAGYL) 500 MG tablet Take 1 tablet (500 mg) by mouth 2 times daily 14 tablet 0     rosuvastatin (CRESTOR) 40 MG tablet TAKE ONE TABLET BY MOUTH EVERY DAY 90 tablet 3     sildenafil (VIAGRA) 50 MG tablet as needed for ED         Allergies:  No Known Allergies    Family history:  Family History   Problem Relation Age of Onset     Coronary Artery Disease Mother      Hypertension Mother      Cerebrovascular Disease Mother        Social history:  Social History     Tobacco Use     Smoking status: Never Smoker     Smokeless tobacco: Never Used   Substance Use Topics     Alcohol use: Yes     Marital status: .    There are no exam notes on file for this visit.   Total face to face time was 15 minutes, >50% counseling, in addition to the procedure time.    SYLVIA Tai, NP-C  Colon and Rectal Surgery  [facility]    This note was created using speech recognition software and may contain unintended word substitutions.

## 2020-01-27 NOTE — NURSING NOTE
Clinic Administered Medication Documentation    MEDICATION LIST:   Injectable Medication Documentation    Patient was given Lidocaine with epinephrine. Prior to medication administration, verified patients identity using patient s name and date of birth. Please see MAR and medication order for additional information. Patient instructed to remain in clinic for 15 minutes and report any adverse reaction to staff immediately .      Was entire vial of medication used? No, The remainder 8ML of 20ML was discarded as unavoidable waste.  Vial/Syringe: Multi dose vial  Expiration Date:  10/21  Was this medication supplied by the patient? No     RAMAN Tolentino  Colon and Rectal Surgery

## 2020-01-28 ENCOUNTER — HOSPITAL ENCOUNTER (OUTPATIENT)
Dept: MRI IMAGING | Facility: CLINIC | Age: 60
Discharge: HOME | End: 2020-01-28
Attending: NURSE PRACTITIONER | Admitting: NURSE PRACTITIONER
Payer: COMMERCIAL

## 2020-01-28 PROCEDURE — 72197 MRI PELVIS W/O & W/DYE: CPT

## 2020-01-28 PROCEDURE — A9585 GADOBUTROL INJECTION: HCPCS | Performed by: NURSE PRACTITIONER

## 2020-01-28 PROCEDURE — 25500064 ZZH RX 255 OP 636: Performed by: NURSE PRACTITIONER

## 2020-01-28 RX ORDER — GADOBUTROL 604.72 MG/ML
9 INJECTION INTRAVENOUS ONCE
Status: COMPLETED | OUTPATIENT
Start: 2020-01-28 | End: 2020-01-28

## 2020-01-28 RX ADMIN — GADOBUTROL 9 ML: 604.72 INJECTION INTRAVENOUS at 10:59

## 2020-01-29 DIAGNOSIS — L08.9 SOFT TISSUE INFECTION: Primary | ICD-10-CM

## 2020-01-29 LAB
BACTERIA SPEC CULT: ABNORMAL
BACTERIA SPEC CULT: ABNORMAL
SPECIMEN SOURCE: ABNORMAL
SPECIMEN SOURCE: ABNORMAL

## 2020-02-27 ENCOUNTER — E-VISIT (OUTPATIENT)
Dept: PEDIATRICS | Facility: CLINIC | Age: 60
End: 2020-02-27
Payer: COMMERCIAL

## 2020-02-27 ENCOUNTER — OFFICE VISIT (OUTPATIENT)
Dept: SURGERY | Facility: CLINIC | Age: 60
End: 2020-02-27
Payer: COMMERCIAL

## 2020-02-27 VITALS
HEART RATE: 61 BPM | SYSTOLIC BLOOD PRESSURE: 142 MMHG | WEIGHT: 211.5 LBS | OXYGEN SATURATION: 96 % | BODY MASS INDEX: 28.65 KG/M2 | DIASTOLIC BLOOD PRESSURE: 78 MMHG | HEIGHT: 72 IN

## 2020-02-27 DIAGNOSIS — L73.9 FOLLICULITIS: ICD-10-CM

## 2020-02-27 DIAGNOSIS — K61.0 PERIANAL ABSCESS: ICD-10-CM

## 2020-02-27 DIAGNOSIS — K60.30 ANAL FISTULA: Primary | ICD-10-CM

## 2020-02-27 DIAGNOSIS — L73.9 FOLLICULITIS: Primary | ICD-10-CM

## 2020-02-27 PROCEDURE — 99421 OL DIG E/M SVC 5-10 MIN: CPT | Performed by: NURSE PRACTITIONER

## 2020-02-27 RX ORDER — DOXYCYCLINE HYCLATE 100 MG
100 TABLET ORAL 2 TIMES DAILY
Qty: 14 TABLET | Refills: 0 | Status: SHIPPED | OUTPATIENT
Start: 2020-02-27 | End: 2020-06-15

## 2020-02-27 ASSESSMENT — PAIN SCALES - GENERAL: PAINLEVEL: NO PAIN (0)

## 2020-02-27 ASSESSMENT — MIFFLIN-ST. JEOR: SCORE: 1817.36

## 2020-02-27 NOTE — NURSING NOTE
Chief Complaint   Patient presents with     Consult     Re-check fistulotomy and seton.       Vitals:    02/27/20 0758   BP: (!) 142/78   BP Location: Left arm   Patient Position: Sitting   Cuff Size: Adult Large   Pulse: 61   SpO2: 96%   Weight: 211 lb 8 oz   Height: 6'       Body mass index is 28.68 kg/m .      Eliezer Childers, EMT

## 2020-02-27 NOTE — PROGRESS NOTES
Colon and Rectal Surgery Postoperative Clinic Note    RE: Tremayne Galarza  : 1960  STEFANIA: 2020    Tremayne Galarza is a very pleasant 59 year old male who presents today for follow up for perianal fistula.    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     He developed two small boils on his buttock and hip with cultures showing staphylococcus aureus and he was treated with Augmentin.    MRI Pelvis 20:  1. 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  2. Near complete resolution of the previously seen posterior perianal  abscess, with minimal residual dilation of the superior fistula tract.  3. Skin and subcutaneous soft tissue thickening and enhancement in the  anterior left perineal region corresponding to the clinically visible  lesion.    Interval history: Tremayne reports that the boils resolved but he has since developed on on his inner thigh, scalp, and thinks he has a new one on his buttock and left side of his neck. Some drainage from his fistula site that is worse with harder bowel movements. No significant pain and no fevers or chills.    Physical Examination: Exam was chaperoned by Eliezer Childers, EMT  BP (!) 142/78 (BP Location: Left arm, Patient Position: Sitting, Cuff Size: Adult Large)   Pulse 61   Ht 6'   Wt 211 lb 8 oz   SpO2 96%   BMI 28.68 kg/m    General: Alert, oriented, in no acute distress, sitting comfortably  HEENT: Mucous membranes moist  Perianal external examination:  Fistulotomy site in the posterior midline with yellow vessel loop seton in place and no erythema or drainage.  Small, semi firm lesion in the left perianal position. Multiple small lesions on buttocks and thighs.    Digital rectal examination: Was deferred.    Anoscopy: Was deferred.    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 He has developed multiple small boils on his buttocks, inner thighs, neck, and scalp. Buttocks with what looks like folliculitis. Would like him to see dermatology to try to get this resolved before discussing further intervention for his fistula tract/seton. Will have him return to meet with Dr. Calderon after he has met with dermatology. Asked him to notify the clinic for any worsening perianal symptoms in the meantime. Patient's questions were answered to his stated satisfaction and he is in agreement with this plan.    Medical history:  Past Medical History:   Diagnosis Date     Chronic ischemic heart disease 9/4/2014    Overview:  3 stents to RCA 8/2014      Essential hypertension 2/11/2005    Overview:  LW Onset:  85Hli43      Hyperlipidemia 11/14/2005    Overview:  LW Onset:  14Nov05        Surgical history:  Past Surgical History:   Procedure Laterality Date     BIOPSY       CARDIAC SURGERY       COLONOSCOPY N/A 12/30/2015    Procedure: COLONOSCOPY;  Surgeon: Duane, William Charles, MD;  Location: [facility]     COLONOSCOPY WITH CO2 INSUFFLATION N/A 12/30/2015    Procedure: COLONOSCOPY WITH CO2 INSUFFLATION;  Surgeon: Duane, William Charles, MD;  Location: [facility]     EXAM UNDER ANESTHESIA ANUS N/A 1/10/2020    Procedure: EXAM UNDER ANESTHESIA, ANUS, inscision and drainage of perirectal abscess, partial fistulotomy,  seton placement;  Surgeon: Camila Calderon MD;  [facility]     FISTULOTOMY RECTUM N/A 1/10/2020    Procedure: partial FISTULOTOMY, RECTUM;  Surgeon: Camila Calderon MD;  [facility]     PLACEMENT OF SETON RECTUM N/A 1/10/2020    Procedure: PLACEMENT, SETON STITCH;  Surgeon: Camila Calderon MD;  Location: [facility]       Problem list:    Patient Active Problem List    Diagnosis Date Noted     Perianal abscess 01/09/2020      Priority: Medium     Added automatically from request for surgery [id number]       Anal fistula 01/09/2020     Priority: Medium     Added automatically from request for surgery [id number]       Verruca plantaris 04/11/2018     Priority: Medium     LUQ abdominal pain 03/27/2018     Priority: Medium     CAD (coronary artery disease) 08/05/2015     Priority: Medium     Status post coronary angiogram 03/26/2015     Priority: Medium     Nondependent alcohol abuse 02/03/2015     Priority: Medium     Chronic ischemic heart disease 09/04/2014     Priority: Medium     Overview:   3 stents to RCA 8/2014       Postsurgical percutaneous transluminal coronary angioplasty status 09/04/2014     Priority: Medium     Benign neoplasm of colon 06/05/2013     Priority: Medium     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 Provider to review and confirm.       HTN, goal below 140/90 02/11/2005     Priority: Medium     Overview:   LW Onset:  52Hro39       Obesity 02/11/2005     Priority: Medium     Overview:   LW Onset:  11Feb05       Allergic rhinitis 02/11/2005     Priority: Medium     Overview:   LW Onset:  11Feb05         Medications:  Current Outpatient Medications   Medication Sig Dispense Refill     aspirin 325 MG tablet Take 1 tablet (325 mg) by mouth daily (Patient taking differently: Take 81 mg by mouth daily ) 90 tablet 3     atenolol (TENORMIN) 25 MG tablet TAKE ONE TABLET BY MOUTH EVERY DAY 30 tablet 4     ezetimibe (ZETIA) 10 MG tablet Take 1 tablet (10 mg) by mouth daily 90 tablet 3     fexofenadine (ALLEGRA) 60 MG tablet Take 0.5 tablets (30 mg) by mouth daily 60 tablet 3     rosuvastatin (CRESTOR) 40 MG tablet TAKE ONE TABLET BY MOUTH EVERY DAY 90 tablet 3     sildenafil (VIAGRA) 50 MG tablet as needed for ED         Allergies:  No Known Allergies    Family  history:  Family History   Problem Relation Age of Onset     Coronary Artery Disease Mother      Hypertension Mother      Cerebrovascular Disease Mother        Social history:  Social History     Tobacco Use     Smoking status: Never Smoker     Smokeless tobacco: Never Used   Substance Use Topics     Alcohol use: Yes     Marital status: .    Nursing Notes:   Eliezer Childers EMT  2/27/2020  8:16 AM  Signed  Chief Complaint   Patient presents with     Consult     Re-check fistulotomy and seton.       Vitals:    02/27/20 0758   BP: (!) 142/78   BP Location: Left arm   Patient Position: Sitting   Cuff Size: Adult Large   Pulse: 61   SpO2: 96%   Weight: 211 lb 8 oz   Height: 6'       Body mass index is 28.68 kg/m .      RAMAN Tolentino                       Total face to face time was 15 minutes, >50% counseling      SYLVIA Tai, NP-C  Colon and Rectal Surgery  [facility]    This note was created using speech recognition software and may contain unintended word substitutions.

## 2020-02-27 NOTE — LETTER
2020       RE: Tremayne Galarza  [address] [facility] [address]     Dear Colleague,    Thank you for referring your patient, Tremayne Galarza, to the [facility] COLON AND RECTAL SURGERY at [facility]. Please see a copy of my visit note below.    Colon and Rectal Surgery Postoperative Clinic Note    RE: Tremayne Galarza  : 1960  STEFANIA: 2020    Tremayne Galarza is a very pleasant 59 year old male who presents today for follow up for perianal fistula.    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     He developed two small boils on his buttock and hip with cultures showing staphylococcus aureus and he was treated with Augmentin.    MRI Pelvis 20:  1. 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  2. Near complete resolution of the previously seen posterior perianal  abscess, with minimal residual dilation of the superior fistula tract.  3. Skin and subcutaneous soft tissue thickening and enhancement in the  anterior left perineal region corresponding to the clinically visible  lesion.    Interval history: Tremayne reports that the boils resolved but he has since developed on on his inner thigh, scalp, and thinks he has a new one on his buttock and left side of his neck. Some drainage from his fistula site that is worse with harder bowel movements. No significant pain and no fevers or chills.    Physical Examination: Exam was chaperoned by Eliezer Childers, EMT  BP (!) 142/78 (BP Location: Left arm, Patient Position: Sitting, Cuff Size: Adult Large)   Pulse 61   Ht 6'   Wt 211 lb 8 oz   SpO2 96%   BMI 28.68 kg/m     General: Alert, oriented, in no acute distress, sitting comfortably  HEENT: Mucous membranes moist  Perianal external examination:  Fistulotomy site in the posterior midline with yellow vessel  loop seton in place and no erythema or drainage.  Small, semi firm lesion in the left perianal position. Multiple small lesions on buttocks and thighs.    Digital rectal examination: Was deferred.    Anoscopy: Was deferred.    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 He has developed multiple small boils on his buttocks, inner thighs, neck, and scalp. Buttocks with what looks like folliculitis. Would like him to see dermatology to try to get this resolved before discussing further intervention for his fistula tract/seton. Will have him return to meet with Dr. Calderon after he has met with dermatology. Asked him to notify the clinic for any worsening perianal symptoms in the meantime. Patient's questions were answered to his stated satisfaction and he is in agreement with this plan.    Medical history:  Past Medical History:   Diagnosis Date     Chronic ischemic heart disease 9/4/2014    Overview:  3 stents to RCA 8/2014      Essential hypertension 2/11/2005    Overview:  LW Onset:  [id number]      Hyperlipidemia 11/14/2005    Overview:  LW Onset:  56Tcw50        Surgical history:  Past Surgical History:   Procedure Laterality Date     BIOPSY       CARDIAC SURGERY       COLONOSCOPY N/A 12/30/2015    Procedure: COLONOSCOPY;  Surgeon: Duane, William Charles, MD;  Location: [facility] OR     COLONOSCOPY WITH CO2 INSUFFLATION N/A 12/30/2015    Procedure: COLONOSCOPY WITH CO2 INSUFFLATION;  Surgeon: Duane, William Charles, MD;  Location: [facility] OR     EXAM UNDER ANESTHESIA ANUS N/A 1/10/2020    Procedure: EXAM UNDER ANESTHESIA, ANUS, inscision and drainage of perirectal abscess, partial fistulotomy,  seton placement;  Surgeon: Camila Calderon MD;  Location: [facility] OR     FISTULOTOMY RECTUM N/A 1/10/2020    Procedure: partial FISTULOTOMY, RECTUM;  Surgeon: Yumiko  Camila REYNOSO MD;  Location: [facility] OR     PLACEMENT OF SETON RECTUM N/A 1/10/2020    Procedure: PLACEMENT, SETON STITCH;  Surgeon: Camila Calderon MD;  Location: [facility] OR       Problem list:    Patient Active Problem List    Diagnosis Date Noted     Perianal abscess 01/09/2020     Priority: Medium     Added automatically from request for surgery [id number]       Anal fistula 01/09/2020     Priority: Medium     Added automatically from request for surgery [id number]       Verruca plantaris 04/11/2018     Priority: Medium     LUQ abdominal pain 03/27/2018     Priority: Medium     CAD (coronary artery disease) 08/05/2015     Priority: Medium     Status post coronary angiogram 03/26/2015     Priority: Medium     Nondependent alcohol abuse 02/03/2015     Priority: Medium     Chronic ischemic heart disease 09/04/2014     Priority: Medium     Overview:   3 stents to RCA 8/2014       Postsurgical percutaneous transluminal coronary angioplasty status 09/04/2014     Priority: Medium     Benign neoplasm of colon 06/05/2013     Priority: Medium     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 Provider to review and confirm.       HTN, goal below 140/90 02/11/2005     Priority: Medium     Overview:   LW Onset:  44Avi26       Obesity 02/11/2005     Priority: Medium     Overview:   LW Onset:  87Bww21       Allergic rhinitis 02/11/2005     Priority: Medium     Overview:   LW Onset:  37Qtm57         Medications:  Current Outpatient Medications   Medication Sig Dispense Refill     aspirin 325 MG tablet Take 1 tablet (325 mg) by mouth daily (Patient taking differently: Take 81 mg by mouth daily ) 90 tablet 3     atenolol (TENORMIN) 25 MG tablet TAKE ONE TABLET BY MOUTH EVERY DAY 30 tablet 4     ezetimibe (ZETIA) 10 MG tablet Take 1 tablet (10 mg) by mouth daily 90 tablet  3     fexofenadine (ALLEGRA) 60 MG tablet Take 0.5 tablets (30 mg) by mouth daily 60 tablet 3     rosuvastatin (CRESTOR) 40 MG tablet TAKE ONE TABLET BY MOUTH EVERY DAY 90 tablet 3     sildenafil (VIAGRA) 50 MG tablet as needed for ED         Allergies:  No Known Allergies    Family history:  Family History   Problem Relation Age of Onset     Coronary Artery Disease Mother      Hypertension Mother      Cerebrovascular Disease Mother        Social history:  Social History     Tobacco Use     Smoking status: Never Smoker     Smokeless tobacco: Never Used   Substance Use Topics     Alcohol use: Yes     Marital status: .    Nursing Notes:   Eliezer Childers EMT  2/27/2020  8:16 AM  Signed  Chief Complaint   Patient presents with     Consult     Re-check fistulotomy and seton.       Vitals:    02/27/20 0758   BP: (!) 142/78   BP Location: Left arm   Patient Position: Sitting   Cuff Size: Adult Large   Pulse: 61   SpO2: 96%   Weight: 211 lb 8 oz   Height: 6'     Body mass index is 28.68 kg/m .    RAMAN Tolentino       Total face to face time was 15 minutes, >50% counseling      SYLVIA Tai, NP-C  Colon and Rectal Surgery  [facility]    This note was created using speech recognition software and may contain unintended word substitutions.

## 2020-02-27 NOTE — PATIENT INSTRUCTIONS
Patient Education     Folliculitis  Folliculitis is an inflammation of a hair follicle. A hair follicle is the little pocket where a hair grows out of the skin. Bacteria normally live on the skin. But sometimes bacteria can get trapped in a follicle and cause infection. This causes a bumpy rash. The area over the follicles is red and raised. It may itch or be painful. The bumps may have fluid (pus) inside. The pus may leak and then form crusts. Sores can spread to other areas of the body. Once it goes away, folliculitis can come back at any time. Severe cases may cause permanent hair loss and scarring.  Folliculitis can happen anywhere on the body where hair grows. It can be caused by rubbing from tight clothing. Ingrown hairs can cause it. Soaking in a hot tub or swimming pool that has bacteria in the water can cause it. It may also occur if a hair follicle is blocked by a bandage.  Sores often go away in a few days with no treatment. In some cases, medicine may be given. A small piece of skin or pus may be taken to find the type of bacteria causing the infection.  Home care  The healthcare provider may prescribe an antibiotic cream or ointment.  Oral antibiotics may also be prescribed. Or you may be told to use an over-the-counter antibiotic cream. Follow all instructions when using any of these medicines.  General care    Apply warm, moist compresses to the sores for 20 minutes up to 3 times a day. You can make a compress by soaking a cloth in warm water. Squeeze out excess water.    Don t cut, poke, or squeeze the sores. This can be painful and spread infection.    Don t scratch the affected area. Scratching can delay healing.    Don t shave the areas affected by folliculitis.    If the sores leak fluid, cover the area with a nonstick gauze bandage. Use as little tape as possible. Carefully discard all soiled bandages.    Dress in loose cotton clothing.    Change sheets and blankets if they are soiled by pus.  Wash all clothes, towels, sheets, and cloth diapers in soap and hot water. Do not share clothes, towels, or sheets with other family members.    Do not soak the sores in bath water. This can spread infection. Instead, keep the area clean by gently washing sores with soap and warm water.    Wash your hands or use antibacterial gels often to prevent spreading the bacteria.  Follow-up care  Follow up with your healthcare provider, or as advised.  When to seek medical advice  Call your healthcare provider right away if any of these occur:    Fever of 100.4 F (38 C) or higher    Spreading of the rash    Rash does not get better with treatment    Redness or swelling that gets worse    Rash becomes more painful    Foul-smelling fluid leaking from the skin    Rash improves, but then comes back   Date Last Reviewed: 11/1/2016 2000-2019 [employer]. [address] [place] [address], [place] [address]. All rights reserved. This information is not intended as a substitute for professional medical care. Always follow your healthcare professional's instructions.

## 2020-03-03 ENCOUNTER — OFFICE VISIT (OUTPATIENT)
Dept: DERMATOLOGY | Facility: CLINIC | Age: 60
End: 2020-03-03
Attending: NURSE PRACTITIONER
Payer: COMMERCIAL

## 2020-03-03 DIAGNOSIS — B95.8 STAPH INFECTION: ICD-10-CM

## 2020-03-03 DIAGNOSIS — L57.0 AK (ACTINIC KERATOSIS): Primary | ICD-10-CM

## 2020-03-03 DIAGNOSIS — Z86.018 HISTORY OF DYSPLASTIC NEVUS: ICD-10-CM

## 2020-03-03 DIAGNOSIS — L02.91 ABSCESS: ICD-10-CM

## 2020-03-03 DIAGNOSIS — Z85.828 HISTORY OF NONMELANOMA SKIN CANCER: ICD-10-CM

## 2020-03-03 PROCEDURE — 87186 SC STD MICRODIL/AGAR DIL: CPT | Performed by: DERMATOLOGY

## 2020-03-03 PROCEDURE — 17000 DESTRUCT PREMALG LESION: CPT | Mod: 59 | Performed by: DERMATOLOGY

## 2020-03-03 PROCEDURE — 87070 CULTURE OTHR SPECIMN AEROBIC: CPT | Performed by: DERMATOLOGY

## 2020-03-03 PROCEDURE — 99213 OFFICE O/P EST LOW 20 MIN: CPT | Mod: 25 | Performed by: DERMATOLOGY

## 2020-03-03 PROCEDURE — 17003 DESTRUCT PREMALG LES 2-14: CPT | Performed by: DERMATOLOGY

## 2020-03-03 PROCEDURE — 87077 CULTURE AEROBIC IDENTIFY: CPT | Performed by: DERMATOLOGY

## 2020-03-03 PROCEDURE — 10060 I&D ABSCESS SIMPLE/SINGLE: CPT | Performed by: DERMATOLOGY

## 2020-03-03 RX ORDER — MUPIROCIN 20 MG/G
OINTMENT TOPICAL
Qty: 60 G | Refills: 2 | Status: SHIPPED | OUTPATIENT
Start: 2020-03-03 | End: 2021-04-15

## 2020-03-03 RX ORDER — DOXYCYCLINE 100 MG/1
100 CAPSULE ORAL 2 TIMES DAILY
Qty: 42 CAPSULE | Refills: 0 | Status: SHIPPED | OUTPATIENT
Start: 2020-03-03 | End: 2020-03-19

## 2020-03-03 ASSESSMENT — PAIN SCALES - GENERAL: PAINLEVEL: NO PAIN (0)

## 2020-03-03 NOTE — LETTER
3/3/2020         RE: Tremayne Galarza  [address] [facility] [address]        Dear Colleague,    Thank you for referring your patient, Tremayne Galarza, to the [facility]. Please see a copy of my visit note below.    [facility] Dermatology Note      Dermatology Problem List:  1. NMSC  -BCC, upper chest, s/p ED&C 8/4/2016  -BCC, base of neck, s/p excision 8/4/2016  2. Compound nevus with severe dysplasia, left chest  -s/p excision 1/15/2016  3. Eczema, left shin  -Previous Tx:  triamcinolone (initiated 1/4/2016)  4. Subcutaneous nodules  -current tx: doxycycline, mupirocin, BPO wash, Bactroban  -s/p expression 03/03/20  -s/p bacterial culture 03/03/20  5. AKs  -s/p cryotherapy    Encounter Date: Mar 3, 2020    CC:  Chief Complaint   Patient presents with     Derm Problem     folliculitis on Doxycycline      Skin Check     no areas of concern hx mnsc         History of Present Illness:  Mr. Tremayne Galarza is a 60 year old male who presents as a follow-up for history of NMSC. The patient was last seen on 7/10/2018 when AKs underwent cryotherapy. Today patient reports no new areas of concern.     Reports he has been diagnosed with folliculitis on his neck, buttocks, and thigh via his PCP and was given doxycycline. Reports little benefit. Patient reports warm compresses helped some.     Reports staph infection on R hip.      No other concerns.     Past Medical History:   Patient Active Problem List   Diagnosis     Chronic ischemic heart disease     Benign neoplasm of colon     Hyperlipidemia with target LDL less than 100     HTN, goal below 140/90     Lesion of plantar nerve     Obesity     Allergic rhinitis     Postsurgical percutaneous transluminal coronary angioplasty status     Nondependent alcohol abuse     Status post coronary angiogram     CAD (coronary artery disease)     LUQ abdominal pain     Verruca plantaris     Perianal abscess     Anal fistula     Past Medical  History:   Diagnosis Date     Chronic ischemic heart disease 9/4/2014    Overview:  3 stents to RCA 8/2014      Essential hypertension 2/11/2005    Overview:  LW Onset:  67Bsy85      Hyperlipidemia 11/14/2005    Overview:  LW Onset:  77Mvq24      Past Surgical History:   Procedure Laterality Date     BIOPSY       CARDIAC SURGERY       COLONOSCOPY N/A 12/30/2015    Procedure: COLONOSCOPY;  Surgeon: Duane, William Charles, MD;  Location: [facility] OR     COLONOSCOPY WITH CO2 INSUFFLATION N/A 12/30/2015    Procedure: COLONOSCOPY WITH CO2 INSUFFLATION;  Surgeon: Duane, William Charles, MD;  Location: [facility] OR     EXAM UNDER ANESTHESIA ANUS N/A 1/10/2020    Procedure: EXAM UNDER ANESTHESIA, ANUS, inscision and drainage of perirectal abscess, partial fistulotomy,  seton placement;  Surgeon: Camila Calderon MD;  [facility]     FISTULOTOMY RECTUM N/A 1/10/2020    Procedure: partial FISTULOTOMY, RECTUM;  Surgeon: Camila Calderon MD;  [facility] OR     PLACEMENT OF SETON RECTUM N/A 1/10/2020    Procedure: PLACEMENT, SETON STITCH;  Surgeon: Camila Calderon MD;  [facility]: [facility] OR       Social History:  The patient works as a IT Executive. The patient denies use of tanning beds. The patient has 3-6 alcoholic drinks per week, does not smoke or use tobacco. The patient has 3 grandchildren.      Family History:  There is no family history of skin cancer.  There is a family history of cardiovascular disease.       Medications:  Current Outpatient Medications   Medication Sig Dispense Refill     aspirin 325 MG tablet Take 1 tablet (325 mg) by mouth daily (Patient taking differently: Take 81 mg by mouth daily ) 90 tablet 3     atenolol (TENORMIN) 25 MG tablet TAKE ONE TABLET BY MOUTH EVERY DAY 30 tablet 4     doxycycline hyclate (VIBRA-TABS) 100 MG tablet Take 1 tablet (100 mg) by mouth 2 times daily 14 tablet 0     ezetimibe (ZETIA) 10 MG tablet Take 1 tablet (10 mg) by mouth daily 90 tablet 3      rosuvastatin (CRESTOR) 40 MG tablet TAKE ONE TABLET BY MOUTH EVERY DAY 90 tablet 3     fexofenadine (ALLEGRA) 60 MG tablet Take 0.5 tablets (30 mg) by mouth daily (Patient not taking: Reported on 3/3/2020) 60 tablet 3     sildenafil (VIAGRA) 50 MG tablet as needed for ED         No Known Allergies    Review of Systems:  -Constitutional: Feeling generally well. In usual state of health.    -Skin: As above in HPI. No additional skin concerns.    Physical exam:  Vitals: There were no vitals taken for this visit.  GEN: This is a well developed, well-nourished male in no acute distress, in a pleasant mood.    SKIN: Total skin excluding the undergarment areas was performed. The exam included the head/face, neck, both arms, chest, back, abdomen, both legs, digits and/or nails. Including buttocks. Declines genital skin exam.    -There are erythematous macules with overyling adherent scale on the right cheek, left cheek, left postauricular.   -images of furuncle like lesions   -subcutaneous nodule, left neck  -macular erythema  -Subcutaneous nodule, left neck   -resolving erythematous macule, left buttock  -No other lesions of concern on areas examined.       Impression/Plan:  1. History of NMSC   -Sun precaution was advised including the use of sun screens of SPF 30 or higher, sun protective clothing, and avoidance of tanning beds.    2. History of dysplastic nevus: no evidence of recurrence   -ABCD's of melanoma were reviewed with patient and handout provided.     3. Actinic keratosis, right cheek, left cheek, left postauricular region  -Cryotherapy procedure note: After verbal consent and discussion of risks and benefits including but no limited to dyspigmentation/scar, blister, and pain, 3 was(were) treated with 1-2mm freeze border for 2 cycles with liquid nitrogen. Post cryotherapy instructions were provided.     4. Subcutaneous nodule, left neck - denies pus, reports decrease in size with doxycycline, not resolving,  admits to mild tenderness. I and D consistent with abscess. In the setting of recent fistula and perirectal abscess and several furuncle like lesion sin shaving region and area likely prepped for surgery and hair bearing areas. REcommend decolonization, lengthen course of antibiotics.   - The area was cleaned with isopropyl alcohol. 0.5mL of 1% lidocaine with epinephrine was injected to obtain adequate anesthesia of the lesion on the left neck. Then was nicked with an 18 gauge needle and expressed.  bacterial culture was obtained  -Start Bactroban TID to lesions, nares, and underneath fingernails for 3 weeks.   -Recommend benzoyl peroxide 10% wash on body.Patient counseled medication can bleach towels and clothing.  -Start mupirocin ointment.   -Extend doxycycline course given patient's reported improvement. 100 mg BID.   -Temperature obtained in clinic today.  -checking with colorectal to make sure BP wash okay perirectal area  -go to ER if feeling ill or increasing pain or redness  Follow-up in 3 weeks for staph and ak recheck, and 8 months for total skin exam, earlier for new or changing lesions.       Staff Involved:  Scribe/Staff    Scribe Disclosure  I, Hipolito Barnhart, am serving as a scribe to document services personally performed by Dr. Radha Orona MD, based on data collection and the provider's statements to me.    Provider Disclosure:   The documentation recorded by the scribe accurately reflects the services I personally performed and the decisions made by me.    Radha Orona MD    Department of Dermatology  [facility]: Phone: [phone], Fax:[phone]  [facility]: Phone: [phone], Fax: [phone]              Again, thank you for allowing me to participate in the care of your patient.        Sincerely,        Radha Orona MD

## 2020-03-03 NOTE — PATIENT INSTRUCTIONS
Use mupirocin three times daily on wounds and nails and nares.   Use benzoyl peroxide wash as below  Go to ER for fevers, chills, increasing redness, increasing discharge or pain        Cryotherapy    What is it?    Use of a very cold liquid, such as liquid nitrogen, to freeze and destroy abnormal skin cells that need to be removed    What should I expect?    Tenderness and redness    A small blister that might grow and fill with dark purple blood. There may be crusting.    More than one treatment may be needed if the lesions do not go away.    How do I care for the treated area?    Gently wash the area with your hands when bathing.    Use a thin layer of Vaseline to help with healing. You may use a Band-Aid.     The area should heal within 7-10 days and may leave behind a pink or lighter color.     Do not use an antibiotic or Neosporin ointment.     You may take acetaminophen (Tylenol) for pain.     Call your Doctor if you have:    Severe pain    Signs of infection (warmth, redness, cloudy yellow drainage, and or a bad smell)    Questions or concerns    Who should I call with questions?       [facility]: [phone]       [facility]: [phone]       For urgent needs outside of business hours call the [facility] at [phone]        and ask for the dermatology resident on call      Start over-the-counter benzoyl peroxide 10% wash on the body.  If 10% is too irritating you can use the 5%. ([employer] makes this product. It is available here at the pharmacy or at [employer]). This medication can bleach your towels and clothing.     It is found in a purple tube in the acne aisle.             Wound Care After a Skin procedure    What is a skin procedure?  A skin procedure allows the doctor to examine a very small culture under the microscope to determine the diagnosis and the best treatment for the skin condition. A local anesthetic (numbing  medicine)  is injected with a very small needle into the skin area to be tested. A small piece of skin is taken from the area. Sometimes a suture (stitch) is used.     What are the risks of a skin procedure?  I will experience scar, bleeding, swelling, pain, crusting and redness. I may experience incomplete removal or recurrence. Risks of this procedure are excessive bleeding, bruising, infection, nerve damage, numbness, thick (hypertrophic or keloidal) scar and non-diagnostic biopsy.    How should I care for my wound for the first 24 hours?    Keep the wound dry and covered for 24 hours    If it bleeds, hold direct pressure on the area for 15 minutes. If bleeding does not stop then go to the emergency room    Avoid strenuous exercise the first 1-2 days or as your doctor instructs you    How should I care for the wound after 24 hours?    After 24 hours, remove the bandage    You may bathe or shower as normal    If you had a scalp biopsy, you can shampoo as usual and can use shower water to clean the biopsy site daily    Clean the wound twice a day with gentle soap and water    Do not scrub, be gentle    Apply white petroleum/Vaseline after cleaning the wound with a cotton swab or a clean finger, and keep the site covered with a Bandaid /bandage. Bandages are not necessary with a scalp biopsy    If you are unable to cover the site with a Bandaid /bandage, re-apply ointment 2-3 times a day to keep the site moist. Moisture will help with healing    Avoid strenuous activity for first 1-2 days    Avoid lakes, rivers, pools, and oceans until the stitches are removed or the site is healed    How do I clean my wound?    Wash hands thoroughly with soap or use hand  before all wound care    Clean the wound with gentle soap and water    Apply white petroleum/Vaseline  to wound after it is clean    What should I use to clean my wound?     Cotton-tipped applicators (Qtips )    White petroleum jelly (Vaseline ). Use a  clean new container and use Q-tips to apply.    Bandaids   as needed    Gentle soap     How should I care for my wound long term?    Do not get your wound dirty    Keep up with wound care for one week or until the area is healed.    You should have some soreness but it should be mild and slowly go away over several days. Talk to your doctor about using tylenol for pain,    When should I call my doctor?  If you have increased:     Pain or swelling    Pus or drainage (clear or slightly yellow drainage is ok)    Temperature over 100F    Spreading redness or warmth around wound      Who should I call with questions?    [facility]: [phone]     [facility]: [phone]    For urgent needs outside of business hours call the [facility] at [phone] and ask for the dermatology resident on call  Doxycycline     1.Doxycycline treats and prevents infections and may be used to treat acne.   2.Do not use it if you had an allergic reaction to doxycycline or another tetracycline antibiotic, or if you are pregnant or breastfeeding.  3. If you miss a dose, take a dose as soon as you remember. If it is almost time for your next dose, wait until then and take a regular dose. Do not take extra medicine to make up for a missed dose.   4 . Some foods and medicines can affect the medication. Tell your doctor if you are using any of the following: bismuth subsalicylate, isotretinoin, acitretin, medications that contain aluminum, calcium, or iron (such an antacid or vitamin supplement)  5. This medicine may cause birth defects if being used during pregnancy.  Use two forms of birth control to keep from getting pregnant.   6. Tell your doctor if you had stomach surgery or if you have a history of yeast infections.   7. This medicine may cause the following problems (stop the medication if you experience these symptoms and call your doctor):  Permanent change in  tooth color (in children younger than 8 years old)   Increased pressure inside the head   Yeast infection   Diarrhea    This medicine may make your skin more sun sensitive and increase sun burns. Wear sunscreen and do not tan.     Allergic reaction with itching, hives, facial swelling, throat swelling, chest tightness, trouble breathing     Blistering, peeling, red skin rash     Burning, pain, or irritation in your upper stomach or throat     Joint pain, fever, rash, and unusual tiredness or weakness     Severe headache, dizziness, or vision changes  8. Call your doctor if your symptoms worsen or do not improve  Who should I call with questions?    [facility]: [phone]     [facility]: [phone]    For urgent needs outside of business hours call the [facility] at [phone] and ask for the dermatology resident on call

## 2020-03-03 NOTE — PROGRESS NOTES
[facility] Dermatology Note      Dermatology Problem List:  1. NMSC  -BCC, upper chest, s/p ED&C 8/4/2016  -BCC, base of neck, s/p excision 8/4/2016  2. Compound nevus with severe dysplasia, left chest  -s/p excision 1/15/2016  3. Eczema, left shin  -Previous Tx:  triamcinolone (initiated 1/4/2016)  4. Subcutaneous nodules  -current tx: doxycycline, mupirocin, BPO wash, Bactroban  -s/p expression 03/03/20  -s/p bacterial culture 03/03/20  5. AKs  -s/p cryotherapy    Encounter Date: Mar 3, 2020    CC:  Chief Complaint   Patient presents with     Derm Problem     folliculitis on Doxycycline      Skin Check     no areas of concern hx [facility]         History of Present Illness:  Mr. Tremayne Galarza is a 60 year old male who presents as a follow-up for history of NMSC. The patient was last seen on 7/10/2018 when AKs underwent cryotherapy. Today patient reports no new areas of concern.     Reports he has been diagnosed with folliculitis on his neck, buttocks, and thigh via his PCP and was given doxycycline. Reports little benefit. Patient reports warm compresses helped some.     Reports staph infection on R hip.      No other concerns.     Past Medical History:   Patient Active Problem List   Diagnosis     Chronic ischemic heart disease     Benign neoplasm of colon     Hyperlipidemia with target LDL less than 100     HTN, goal below 140/90     Lesion of plantar nerve     Obesity     Allergic rhinitis     Postsurgical percutaneous transluminal coronary angioplasty status     Nondependent alcohol abuse     Status post coronary angiogram     CAD (coronary artery disease)     LUQ abdominal pain     Verruca plantaris     Perianal abscess     Anal fistula     Past Medical History:   Diagnosis Date     Chronic ischemic heart disease 9/4/2014    Overview:  3 stents to RCA 8/2014      Essential hypertension 2/11/2005    Overview:  LW Onset:  86Ppq03      Hyperlipidemia 11/14/2005    Overview:  LW Onset:  14Nov05       Past Surgical History:   Procedure Laterality Date     BIOPSY       CARDIAC SURGERY       COLONOSCOPY N/A 12/30/2015    Procedure: COLONOSCOPY;  Surgeon: Duane, William Charles, MD;  Location: [facility] OR     COLONOSCOPY WITH CO2 INSUFFLATION N/A 12/30/2015    Procedure: COLONOSCOPY WITH CO2 INSUFFLATION;  Surgeon: Duane, William Charles, MD;  Location: [facility] OR     EXAM UNDER ANESTHESIA ANUS N/A 1/10/2020    Procedure: EXAM UNDER ANESTHESIA, ANUS, inscision and drainage of perirectal abscess, partial fistulotomy,  seton placement;  Surgeon: Camila Calderon MD;  Location: [facility] OR     FISTULOTOMY RECTUM N/A 1/10/2020    Procedure: partial FISTULOTOMY, RECTUM;  Surgeon: Camila Calderon MD;  Location: [facility] OR     PLACEMENT OF SETON RECTUM N/A 1/10/2020    Procedure: PLACEMENT, SETON STITCH;  Surgeon: Camila Calderon MD;  Location: [facility] OR       Social History:  The patient works as a IT Executive. The patient denies use of tanning beds. The patient has 3-6 alcoholic drinks per week, does not smoke or use tobacco. The patient has 3 grandchildren.      Family History:  There is no family history of skin cancer.  There is a family history of cardiovascular disease.       Medications:  Current Outpatient Medications   Medication Sig Dispense Refill     aspirin 325 MG tablet Take 1 tablet (325 mg) by mouth daily (Patient taking differently: Take 81 mg by mouth daily ) 90 tablet 3     atenolol (TENORMIN) 25 MG tablet TAKE ONE TABLET BY MOUTH EVERY DAY 30 tablet 4     doxycycline hyclate (VIBRA-TABS) 100 MG tablet Take 1 tablet (100 mg) by mouth 2 times daily 14 tablet 0     ezetimibe (ZETIA) 10 MG tablet Take 1 tablet (10 mg) by mouth daily 90 tablet 3     rosuvastatin (CRESTOR) 40 MG tablet TAKE ONE TABLET BY MOUTH EVERY DAY 90 tablet 3     fexofenadine (ALLEGRA) 60 MG tablet Take 0.5 tablets (30 mg) by mouth daily (Patient not taking: Reported on 3/3/2020) 60 tablet 3     sildenafil (VIAGRA) 50 MG  tablet as needed for ED         No Known Allergies    Review of Systems:  -Constitutional: Feeling generally well. In usual state of health.    -Skin: As above in HPI. No additional skin concerns.    Physical exam:  Vitals: There were no vitals taken for this visit.  GEN: This is a well developed, well-nourished male in no acute distress, in a pleasant mood.    SKIN: Total skin excluding the undergarment areas was performed. The exam included the head/face, neck, both arms, chest, back, abdomen, both legs, digits and/or nails. Including buttocks. Declines genital skin exam.    -There are erythematous macules with overyling adherent scale on the right cheek, left cheek, left postauricular.   -images of furuncle like lesions   -subcutaneous nodule, left neck  -macular erythema  -Subcutaneous nodule, left neck   -resolving erythematous macule, left buttock  -No other lesions of concern on areas examined.       Impression/Plan:  1. History of NMSC   -Sun precaution was advised including the use of sun screens of SPF 30 or higher, sun protective clothing, and avoidance of tanning beds.    2. History of dysplastic nevus: no evidence of recurrence   -ABCD's of melanoma were reviewed with patient and handout provided.     3. Actinic keratosis, right cheek, left cheek, left postauricular region  -Cryotherapy procedure note: After verbal consent and discussion of risks and benefits including but no limited to dyspigmentation/scar, blister, and pain, 3 was(were) treated with 1-2mm freeze border for 2 cycles with liquid nitrogen. Post cryotherapy instructions were provided.     4. Subcutaneous nodule, left neck - denies pus, reports decrease in size with doxycycline, not resolving, admits to mild tenderness. I and D consistent with abscess. In the setting of recent fistula and perirectal abscess and several furuncle like lesion sin shaving region and area likely prepped for surgery and hair bearing areas. REcommend  decolonization, lengthen course of antibiotics.   - The area was cleaned with isopropyl alcohol. 0.5mL of 1% lidocaine with epinephrine was injected to obtain adequate anesthesia of the lesion on the left neck. Then was nicked with an 18 gauge needle and expressed.  bacterial culture was obtained  -Start Bactroban TID to lesions, nares, and underneath fingernails for 3 weeks.   -Recommend benzoyl peroxide 10% wash on body.Patient counseled medication can bleach towels and clothing.  -Start mupirocin ointment.   -Extend doxycycline course given patient's reported improvement. 100 mg BID.   -Temperature obtained in clinic today.  -checking with colorectal to make sure BP wash okay perirectal area  -go to ER if feeling ill or increasing pain or redness  Follow-up in 3 weeks for staph and ak recheck, and 8 months for total skin exam, earlier for new or changing lesions.       Staff Involved:  Scribe/Staff    Scribe Disclosure  I, Hipolito Barnhart, am serving as a scribe to document services personally performed by Dr. Radha Orona MD, based on data collection and the provider's statements to me.    Provider Disclosure:   The documentation recorded by the scribe accurately reflects the services I personally performed and the decisions made by me.    Radha Orona MD    Department of Dermatology  [facility]: Phone: [phone], Fax:[phone]  [facility]: Phone: [phone], Fax: [phone]

## 2020-03-03 NOTE — NURSING NOTE
Tremayne Galarza's goals for this visit include:   Chief Complaint   Patient presents with     Derm Problem     folliculitis on Doxycycline      Skin Check     no areas of concern hx [facility]       He requests these members of his care team be copied on today's visit information: no    PCP: [place]    Referring Provider:  SYLVIA Gong CNP  [address] [place] [address]      There were no vitals taken for this visit.    Do you need any medication refills at today's visit? No  Onofre Hernandes LPN

## 2020-03-06 LAB
BACTERIA SPEC CULT: ABNORMAL
BACTERIA SPEC CULT: ABNORMAL
Lab: ABNORMAL
SPECIMEN SOURCE: ABNORMAL

## 2020-03-10 ENCOUNTER — HEALTH MAINTENANCE LETTER (OUTPATIENT)
Age: 60
End: 2020-03-10

## 2020-03-11 ENCOUNTER — TELEPHONE (OUTPATIENT)
Dept: DERMATOLOGY | Facility: CLINIC | Age: 60
End: 2020-03-11

## 2020-03-11 NOTE — TELEPHONE ENCOUNTER
Notes recorded by Radha Orona MD on 3/10/2020 at 6:28 PM CDT   I Called the patient. He is doing well. Coming back from out of town.  He will follow the clinic instruction. He will call if this does not resolve.     Radha Orona MD      Department of Dermatology   [facility]: Phone: [phone], Fax:[phone]   [facility]: Phone: [phone], Fax: [phone]     ------     Notes recorded by Georgia Johnson CMA on 3/9/2020 at 10:03 AM CDT   Attempted to call patient.  Voicemail full.  Sent [employer] message to patient     Georgia Johnson CMA     ------     Notes recorded by Kandace Arevalo LPN on 3/6/2020 at 8:22 AM CST   Attempted to contact patient. VM box is full and unable to leave message. Will re-attempt to contact at a later time.     Kandace Arevalo LPN     ------     Notes recorded by Radha Orona MD on 3/5/2020 at 5:56 PM CST   Call josefina and see how he  Doing. Make sure taking his antibiotics.     He is growing staph infection

## 2020-03-12 DIAGNOSIS — E78.00 HYPERCHOLESTEREMIA: ICD-10-CM

## 2020-03-16 ENCOUNTER — TELEPHONE (OUTPATIENT)
Dept: SURGERY | Facility: CLINIC | Age: 60
End: 2020-03-16

## 2020-03-16 RX ORDER — EZETIMIBE 10 MG/1
10 TABLET ORAL DAILY
Qty: 90 TABLET | Refills: 0 | Status: SHIPPED | OUTPATIENT
Start: 2020-03-16 | End: 2020-06-11

## 2020-03-16 NOTE — TELEPHONE ENCOUNTER
Called to discuss rescheduling appointment today due to COVID-19. Will reach out to him to reschedule at a later date. Patient's questions were answered to his stated satisfaction and he is in agreement with this plan.    SYLVIA Tai, NP-C  Colon and Rectal Surgery  [facility]

## 2020-03-17 DIAGNOSIS — B95.8 STAPH INFECTION: ICD-10-CM

## 2020-03-19 RX ORDER — DOXYCYCLINE 100 MG/1
100 CAPSULE ORAL DAILY
Qty: 14 CAPSULE | Refills: 0 | Status: SHIPPED | OUTPATIENT
Start: 2020-03-19 | End: 2020-03-20

## 2020-03-20 ENCOUNTER — TELEPHONE (OUTPATIENT)
Dept: DERMATOLOGY | Facility: CLINIC | Age: 60
End: 2020-03-20

## 2020-03-20 DIAGNOSIS — B95.8 STAPH INFECTION: Primary | ICD-10-CM

## 2020-03-20 RX ORDER — DOXYCYCLINE 100 MG/1
100 CAPSULE ORAL 2 TIMES DAILY
Qty: 14 CAPSULE | Refills: 0 | Status: SHIPPED | OUTPATIENT
Start: 2020-03-20 | End: 2020-06-15

## 2020-03-20 NOTE — TELEPHONE ENCOUNTER
Pharmacy called to clarify directions for Doxy as Pharmacist states in the past this medication has been ordered BID. RN noted last office visit notes below and updated Rx to reflect this...Karie Myers RN            Impression/Plan:  1. History of NMSC   -Sun precaution was advised including the use of sun screens of SPF 30 or higher, sun protective clothing, and avoidance of tanning beds.     2. History of dysplastic nevus: no evidence of recurrence   -ABCD's of melanoma were reviewed with patient and handout provided.      3. Actinic keratosis, right cheek, left cheek, left postauricular region  -Cryotherapy procedure note: After verbal consent and discussion of risks and benefits including but no limited to dyspigmentation/scar, blister, and pain, 3 was(were) treated with 1-2mm freeze border for 2 cycles with liquid nitrogen. Post cryotherapy instructions were provided.      4. Subcutaneous nodule, left neck - denies pus, reports decrease in size with doxycycline, not resolving, admits to mild tenderness. I and D consistent with abscess. In the setting of recent fistula and perirectal abscess and several furuncle like lesion sin shaving region and area likely prepped for surgery and hair bearing areas. REcommend decolonization, lengthen course of antibiotics.   - The area was cleaned with isopropyl alcohol. 0.5mL of 1% lidocaine with epinephrine was injected to obtain adequate anesthesia of the lesion on the left neck. Then was nicked with an 18 gauge needle and expressed.  bacterial culture was obtained  -Start Bactroban TID to lesions, nares, and underneath fingernails for 3 weeks.   -Recommend benzoyl peroxide 10% wash on body.Patient counseled medication can bleach towels and clothing.  -Start mupirocin ointment.   -Extend doxycycline course given patient's reported improvement. 100 mg BID.   -Temperature obtained in clinic today.  -checking with colorectal to make sure BP wash okay perirectal area  -go  to ER if feeling ill or increasing pain or redness  Follow-up in 3 weeks for staph and ak recheck, and 8 months for total skin exam, earlier for new or changing lesions.         Staff Involved:  Scribe/Staff     Scribe Disclosure  I, Hipolito Barnhart, am serving as a scribe to document services personally performed by Dr. Radha Orona MD, based on data collection and the provider's statements to me.     Provider Disclosure:   The documentation recorded by the scribe accurately reflects the services I personally performed and the decisions made by me.     Radha Orona MD    Department of Dermatology  [facility]: Phone: [phone], Fax:[phone]  [facility]: Phone: [phone], Fax: [phone]                     Other Notes      All notes

## 2020-03-27 ENCOUNTER — MYC MEDICAL ADVICE (OUTPATIENT)
Dept: DERMATOLOGY | Facility: CLINIC | Age: 60
End: 2020-03-27

## 2020-03-31 ENCOUNTER — VIRTUAL VISIT (OUTPATIENT)
Dept: DERMATOLOGY | Facility: CLINIC | Age: 60
End: 2020-03-31
Payer: COMMERCIAL

## 2020-03-31 DIAGNOSIS — Z86.19 HISTORY OF STAPH INFECTION: Primary | ICD-10-CM

## 2020-03-31 PROCEDURE — 99212 OFFICE O/P EST SF 10 MIN: CPT | Mod: TEL | Performed by: DERMATOLOGY

## 2020-03-31 ASSESSMENT — PAIN SCALES - GENERAL: PAINLEVEL: NO PAIN (0)

## 2020-03-31 NOTE — PATIENT INSTRUCTIONS
Call for patients if any lesions return, follow up within 1 year for total skin exam          When should I call my doctor?    If you are worsening or not improving, please, contact us or seek urgent care as noted below.     Who should I call with questions?    [facility]: [phone]     [facility]: [phone]    If this is a medical emergency and you are unable to reach an ER, Call 250

## 2020-03-31 NOTE — PROGRESS NOTES
Dermatology Phone Visit    Patient opted to conduct today's return visit via telephone vs an in person visit to the clinic.  I spoke with: Tremayne Galarza    The reason for the telephone visit was: follow up on nodule on neck and AKs. We did not review photos. He declined upload. Reports everything is resolved, wonders if he should stop mupriocin    Pertinent history and review of systems: feeling well overall. Had recent passing of loved one.     Assessment:   1. Actinic keratoses, patient reports resolved with cryo  2. HX of NMSC and dysplastic nevus, due for skin exam 2021  3. Reports nodule on neck totally resolved, stop mupirocin, stop follicuiitls         Phone call contact time:  Call Started at: 16:20  Call Ended at: 16::27      Radha Orona MD    Department of Dermatology  [facility]: Phone: [phone], Fax:[phone]  [facility]: Phone: [phone], Fax: [phone]

## 2020-03-31 NOTE — PROGRESS NOTES
"Teledermatology Nurse Call for RETURN patients seen within the last 3 years:    The patient was contacted by phone and we reviewed, \"Due to the coronavirus pandemic, we are calling to review your visit and offer you a teledermatology visit where you send in photos via [employer]. These photos will be seen by an MD or MARY. This will be billed to you and your insurance.\"  The patient was also told that \"a teledermatology visit is not as thorough as an in-person visit and that the quality of the photograph sent may not be of the same quality as that taken by the dermatology clinic, but the patient would like to proceed with an teledermatology because of National Emergency Regarding Coronavirus disease (COVID 19) Outbreak.\"     The patient understood that they may receive a call from the clinic to review additional history, may still be instructed to come to clinic even after photo review and be billed for both visits with an MD. They were told that a photo assessment does not replace an in person skin exam. The patient understood that teledermatology is not for urgent issues and would require up to 72 hours for review. The patient denied skin pain, fever, mucosal symptoms (lesions, blisters, sores in the mouth, nose, eyes, or genitals)  IF PATIENT ENDORSES ANY OF THESE STOP AND PAGE  ON CALL ATTENDING. IF OTHER POSSIBLY URGENT SYMPTOMS THEN PAGE PHYSICIAN YOU ARE SCHEDULING WITH OR ON CALL IF NO ANSWER.     The patient chose to:  Consent to a teledermatology visit with [employer] photos. The patient understood they must upload a photo for this visit to be completed. They indicated that the photo will be taken at their home address(if other address please document here) and the date of images will be @TD@. The patient verbalized understanding that they and their insurance company would be billed for this visit with an MD. They patient verbalized understanding to the following: they may receive a call from the clinic to " review additional history, they may still be instructed to come to clinic even after photo review and be billed for both visits, photo assessment does not replace an in person skin exam, and photo quality can impact the physicians assessment. The patient was instructed to take photos of all all areas of concern and all areas of any rash.     Patient summary of issue:follow up for folliculitis patient states  Which mediations have been tried, for how long, and did they make it better or worse (ex. Triamcinolone, used twice daily for 2 weeks, not improved): has improved on doxy, mupirocin ointment and BP wash  The patient reports no changes in medical problems: correctyes                                                                                                                                                                                                                                                                                                                                                                                                                                                  Pharmacy preference was updated.    The nurse has dropped in the AVS information (For adults the phrase is umdermhteleavs and for pediatrics it is their own) for the physician to route in the AVS.                                                                                                                                                                                                                           The patient was told to contact the clinic if they have not received correspondence within 72 hours.       Kandace Arevalo LPN

## 2020-04-06 ENCOUNTER — VIRTUAL VISIT (OUTPATIENT)
Dept: CARDIOLOGY | Facility: CLINIC | Age: 60
End: 2020-04-06
Payer: COMMERCIAL

## 2020-04-06 DIAGNOSIS — E78.00 HYPERCHOLESTEREMIA: ICD-10-CM

## 2020-04-06 DIAGNOSIS — I25.10 CORONARY ARTERY DISEASE INVOLVING NATIVE CORONARY ARTERY OF NATIVE HEART WITHOUT ANGINA PECTORIS: ICD-10-CM

## 2020-04-06 DIAGNOSIS — N52.9 ERECTILE DYSFUNCTION, UNSPECIFIED ERECTILE DYSFUNCTION TYPE: Primary | ICD-10-CM

## 2020-04-06 PROCEDURE — 99214 OFFICE O/P EST MOD 30 MIN: CPT | Mod: GT | Performed by: INTERNAL MEDICINE

## 2020-04-06 RX ORDER — ROSUVASTATIN CALCIUM 40 MG/1
40 TABLET, COATED ORAL DAILY
Qty: 90 TABLET | Refills: 3 | Status: SHIPPED | OUTPATIENT
Start: 2020-04-06 | End: 2021-05-13

## 2020-04-06 RX ORDER — SILDENAFIL 50 MG/1
50 TABLET, FILM COATED ORAL DAILY PRN
Qty: 10 TABLET | Refills: 0 | Status: SHIPPED | OUTPATIENT
Start: 2020-04-06 | End: 2020-11-27

## 2020-04-06 RX ORDER — ATENOLOL 25 MG/1
25 TABLET ORAL DAILY
Qty: 90 TABLET | Refills: 3 | Status: SHIPPED | OUTPATIENT
Start: 2020-04-06 | End: 2021-04-12

## 2020-04-06 RX ORDER — ASPIRIN 81 MG/1
81 TABLET, CHEWABLE ORAL
COMMUNITY
Start: 2014-09-29 | End: 2021-10-12

## 2020-04-06 NOTE — PATIENT INSTRUCTIONS
It was a pleasure to see you in the cardiology clinic today.    If you have any questions, you can reach my nurse, Morena Perez, at [phone].     Note the new medications: None  Stop the following medications: None    The results from today include: None    Tests ordered today: Fasting Lipid Panel    I would like you to follow up with Dr. West in one year.    Sincerely,      Tomás West MD     [facility]

## 2020-04-06 NOTE — PROGRESS NOTES
"[facility] VIDEO VISIT:    Tremayne Galarza is a 60 year old male who is being evaluated via a billable video visit.      The patient has been notified of following:     \"This video visit will be conducted via a call between you and your physician/provider. We have found that certain health care needs can be provided without the need for an in-person physical exam.  This service lets us provide the care you need with a video conversation.  If a prescription is necessary we can send it directly to your pharmacy.  If lab work is needed we can place an order for that and you can then stop by our lab to have the test done at a later time.    If during the course of the call the physician/provider feels a video visit is not appropriate, you will not be charged for this service.\"     Patient has given verbal consent for Video visit? Yes    Patient would like the video invitation sent by: Text to cell phone: [phone]    Video-Visit Details  Type of service:  Video Visit  Video Start Time: 4:15 pm  Video End Time (time video stopped): 4:32 pm  Originating Location (pt. Location): Home  Distant Location (provider location): Physician Home  Mode of Communication:  Video Conference via [employer]    HPI: Tremayne Galarza is a 60 year old male referred by Mee Park, for ongoing evaluation of CAD.  The patient's risk factor profile is: (+) HTN, (-) diabetes, (+) hyperlipidemia, (-) tobacco use, (+) family Hx CAD [maternal Hx CAD at age 38].  He has no documented history of arrhythmia, or valvular heart disease.  The patient has a history of CAD dating back to Aug 2014 when he presented to [facility] with a \"tingling sensation in his lungs\" in the absence of chest discomfort.  He was actively working out at [employer] and noticed the abnormal breathing sensation during that strenuous activity.  He had a bicycle ECHO and his LVEF failed to augment (50% --> 45%) and he had anterior-apical " hypokinesis at peak exercise.  On the ECG there was a maximum of 1.0-2.0 mm of upsloping ST depression in leads II, V4, V5, V6.   A maximum of 1.0-2.0 mm. of ST elevation was present in leads avR.  He had coronary angiography on 8/27/14.  The report documents mild nonobstructive CAD in the LAD and LCx.  The RCA had subtotal stenosis in the proximal segment (stented with 3.5x24 mm JOSE) and diffuse narrowing in the mid-distal RCA up to 90% (stented with 2 overlapping JOSE, 3.5x12 and 2.75x20).  The patient had cardiac rehabilitation post coronary angiography and PCI.   I first evaluated him in March 2015 and reviewed his coronary angiogram from [facility].  There was a 60-70% stenosis in the LAD and I was concerned enough to bring him back to the cath lab and check an FFR.  The FFR was 0.78 and the stents in the RCA were widely patent.  He continued on medical therapy.  It has been a year since his last visit.  He has not had subsequent chest discomfort, SOB (including the tingling sensation he originally described), PND, orthopnea, pedal edema, palpitations, lightheadedness, and syncope.  He is active, working at [email or web address] 6 times a week.  He is not experiencing any cardiopulmonary symptoms during exercise.  He had a rectal abscess that developed into a fistula and surgical drainage performed.  He has an detal band in place and will have that removed in a few weeks.  The patient has no history of cerebrovascular disease or PAD.    BPs in ambulatory setting well controlled.  SBPs have been persistently below 130 mm Hg.    The patient has not used [facility] NTG and has Rx for Viagra.  He knows not to use them within 24 hours of one another.    He remains on ASA.  However, he has not had any bleeding symptoms.       PAST MEDICAL HISTORY:  Past Medical History:   Diagnosis Date     Chronic ischemic heart disease 9/4/2014    Overview:  3 stents to RCA 8/2014      Essential hypertension 2/11/2005    Overview:  LW  Onset:  [id number]      Fistula      Hyperlipidemia 11/14/2005    Overview:  LW Onset:  14Nov05      Perirectal abscess      Staph infection        CURRENT MEDICATIONS:  Current Outpatient Medications   Medication Sig     aspirin (CHILDRENS ASPIRIN) 81 MG chewable tablet Take 81 mg by mouth     atenolol (TENORMIN) 25 MG tablet TAKE ONE TABLET BY MOUTH EVERY DAY     ezetimibe (ZETIA) 10 MG tablet Take 1 tablet (10 mg) by mouth daily     fexofenadine (ALLEGRA) 60 MG tablet Take 0.5 tablets (30 mg) by mouth daily     mupirocin (BACTROBAN) 2 % external ointment Use 3 times per day for 3 weeks. Then the first week of every month for 6 months.     rosuvastatin (CRESTOR) 40 MG tablet TAKE ONE TABLET BY MOUTH EVERY DAY     sildenafil (VIAGRA) 50 MG tablet as needed for ED     doxycycline hyclate (VIBRA-TABS) 100 MG tablet Take 1 tablet (100 mg) by mouth 2 times daily     Current Facility-Administered Medications   Medication     lidocaine 1% with EPINEPHrine 1:100,000 injection 12 mL         PAST SURGICAL HISTORY:  Past Surgical History:   Procedure Laterality Date     BIOPSY       CARDIAC SURGERY       COLONOSCOPY N/A 12/30/2015    Procedure: COLONOSCOPY;  Surgeon: Duane, William Charles, MD;  Location: [facility] OR     COLONOSCOPY WITH CO2 INSUFFLATION N/A 12/30/2015    Procedure: COLONOSCOPY WITH CO2 INSUFFLATION;  Surgeon: Duane, William Charles, MD;  Location: [facility] OR     EXAM UNDER ANESTHESIA ANUS N/A 1/10/2020    Procedure: EXAM UNDER ANESTHESIA, ANUS, inscision and drainage of perirectal abscess, partial fistulotomy,  seton placement;  Surgeon: Camila Calderon MD;  Location: [facility] OR     FISTULOTOMY RECTUM N/A 1/10/2020    Procedure: partial FISTULOTOMY, RECTUM;  Surgeon: Camila Calderon MD;  Location: [facility] OR     PLACEMENT OF SETON RECTUM N/A 1/10/2020    Procedure: PLACEMENT, SETON STITCH;  Surgeon: Camila Calderon MD;  Location: [facility] OR       ALLERGIES  Patient has no known allergies.    FAMILY  HX:  Family History   Problem Relation Age of Onset     Coronary Artery Disease Mother      Hypertension Mother      Cerebrovascular Disease Mother        SOCIAL HX:  Social History     Social History     Marital Status:      Spouse Name: N/A     Number of Children: N/A     Years of Education: N/A     Social History Main Topics     Smoking status: Never Smoker      Smokeless tobacco: Never Used     Alcohol Use: Yes     Drug Use: No     Sexual Activity:     Partners: Female     Other Topics Concern     None     Social History Narrative       ROS:  Constitutional: No fever, chills, or sweats. No weight gain/loss.   HEENT: No visual disturbance, ear ache, epistaxis, sore throat.   Allergies/Immunologic: Negative.   Respiratory: No cough, hemoptysis.   Cardiovascular: As per HPI.   GI: No nausea, vomiting, hematemesis, melena, or hematochezia.   : No urinary frequency, dysuria, or hematuria.   Integument: No rash.   Psychiatric: No anxiety / depression.   Neuro: No speech disturbance, focal sensory or motor deficit.   Endocrinology: No polyuria / polyphagia.   Musculoskeletal: No myalgia.    VITAL SIGNS:  No BP today.  Last /87 mm Hg one week ago.  There is no height or weight on file to calculate BMI.  Wt Readings from Last 2 Encounters:   02/27/20 95.9 kg (211 lb 8 oz)   01/27/20 96.6 kg (213 lb)       PHYSICAL EXAM  Tremayne Galarza is a 59 year old male.in no acute distress.   No exam.    LABS  Recent Labs   Lab Test 12/23/19  1544 03/27/18  1411   WBC 8.6 6.4   HGB 14.7 14.0   HCT 44.0 41.5    138*     Recent Labs   Lab Test 12/23/19  1544 03/27/18  1411    138   POTASSIUM 3.9 4.1   CHLORIDE 106 104   CO2 30 27   BUN 20 16   CR 0.93 0.83   GFRESTIMATED >90 >90     Recent Labs   Lab Test 04/01/19  0735 04/30/18  0713  02/06/15  0735 09/23/14   CHOL 174 169   < > 146 155  155  155   HDL 54 54   < > 44 42*  42  42   * 80   < > 80 85  85  85   TRIG 101 175*   < > 110 139  139   139   CHOLHDLRATIO  --   --   --  3.3 3.7  3.7  3.7    < > = values in this interval not displayed.       EK/5/15  SB at 49.  Normal intervals and axes.  No ST shift, TWI, or Q waves.  Isolated PVC.      STRESS ECHO: 14  CONCLUSIONS  REST:  LV chamber size, wall thickness, and segmental and global wall motion are normal. No significant valvular abnormalities are seen.  The visually estimated resting LVEF is 60 %.    STRESS:  There is lack of augmentation of LVEF during peak exercise.  Anterior-apical hypokinesis is suggested.    CONCLUSIONS:  Abnormal exercise echocardiogram with adequate heart rate and workload.  There is lack of augmentation of LVEF during peak exercise.  Anterior-apical hypokinesis is suggested.  Risk stratification by stress echocardiography is identified as intermediate to high risk.    REST ECG: Mild nonspecific ST-T segments.  Resting HR:62 bpmResting BP:122/76 mmHg  Pre-Stress Physical Exam  Current patient medications that may affect electrocardiographic  interpretation.: Atenolol, held for greater than 48 hours.    STRESS  Stress Type: Bike Ergometer  Peak HR: 146 bpm HR Response: Normal  Peak BP: 210/99 mmHg BP Response: Abnormal  Max Predicted HR: 168 bpm HR BP Product: 84853  % of Max Predicted HR: 87 Max Exercise: 9 METS  Test Duration: 14.3 min  Reason for Termination: Fatigue Exercise Effort: Good  Risk Stratification: Intermediate risk    RESULTS  Global LVEF (rest): Normal (LVEF >50%)  Global LVEF (stress): Mildly depressed (LVEF 40-50%)    ECG: A maximum of 1.0-2.0 mm of upsloping ST depression was present in leads II, V4, V5, V6.   A maximum of 1.0-2.0 mm. of ST elevation was present in leads avR.  Q waves were NOT present in leads with ST elevation.    SYMPTOMS  Chest pain was reproduced and did NOT require termination of the test. Pt complained of 3/10 chest burning with peak exercise.  Protocol completed. Predicted heart rate achieved. Hypertensive response to  exercise. Shortness of breath.  Musculoskeletal.  Fatigue.    CARDIAC CATH: 8/27/14  The coronary circulation is right dominant.  Left main coronary artery   Left main: Normal.  Left anterior descending artery and branches  LAD: Normal. Moderate calcification, nonobstructive disease.  Left circumflex artery and branches  Circumflex: Moderate calcification, nonobstructive disease.  Right coronary artery and branches  RCA: This vessel is subtotally occluded in the proximal portion. There is a long segment of disease in the mid/distal portion with JOE 2 flow. Both of these lesions were successfully stented with an everolimus eluting  stents. Proximal RCA: Mid RCA:    Interventions  LESION #1 INTERVENTION: A successful drug-eluting stent was performed on the 99 % lesion in the proximal RCA. Following intervention there was an excellent angiographic appearance with a 0 % residual stenosis. There was JOE 3 flow after the  Procedure.   Balloon angioplasty was performed, using a 3.0 x 15 mm Emerge MR balloon, with 3 inflations and a maximum inflation pressure of 14 juan pablo.   A 3.5 x 24 Premier Promus Stent everolimus-eluting stent was placed across the lesion and deployed at a maximum inflation pressure of 11 juan pablo.    LESION #2 INTERVENTION A successful drug-eluting stent was performed on the 90 % lesion in the mid RCA. Following intervention there was an excellent angiographic appearance with a 0 % residual stenosis.  Balloon angioplasty was performed, using a 3.0 x 15 mm Emerge MR balloon, with 1 inflations and a maximum inflation pressure of 6 juan pablo. Balloon angioplasty was performed, using a 2.75 x 20 mm Emerge MR balloon, with 4 inflations and a maximum inflation pressure of 14 juan pablo. A 2.75 x 38 premier Promus Stent everolimus-eluting stent was placed across the lesion and deployed at a maximum inflation pressure of 18 juan pablo.  A 3.5 x 12 Premier Promus Stent everolimus-eluting stent was placed across the lesion and deployed  at a maximum inflation pressure of 11 juan pablo.    Coronary Angiogram (3/26/15):  Coronary angiography was performed via the Rt RADIAL artery using a 5 Fr Shiraz catheter and a 6 Fr Ikari 3.5 GC.  The LAD and LCx had separate ostia.  Both coronary vessels had moderate calcification in the proximal segment.  The LAD had 30-40% narrowing proximally and a long 60% stenosis in the mid segment.  There was 25% narrowing at the takeoff of D3.  There were three diagonal branches, all moderate in caliber.  D1 had minimal disease.  D2 had 50% ostial narrowing.  D3 had minimal disease.  The LCx gave rise to 2 OM branches.  There was mild atherosclerosis (25%) disease in the LCx that was diffuse in nature.  The OM branches had mild disease. The RCA had a stent in the proximal segment and two overlapping stents spanning the mid-distal segment.  There was mild ISR (10%) in the stented segment.  There was 30-40% disease between the two stents.  There was minimal disease in the Rt PDA and Rt PL segments.    Physiologic assessment was performed on the LAD with the Radiwire positioned at the apex of the LAD.  The FFR of LAD was 0.78 at peak hyperemia.    Impression:    1. Single vessel CAD, angiographically borderline 60-70% long stenosis in mid LAD  2. Physiologically borderline stenosis in mid LAD    ASSESSMENT AND PLAN:    1. CAD.  Mr. Galarza has two vessel CAD and is now 29 months post PCI of the proximal, mid/distal RCA segments with JOSE.  I was concerned about the diffuse nature of the proximal / mid LAD His stress ECHO showed hypokinesis of anterior/apical segments.  He had FFR of 0.78 in the LAD.  I would like him to stay on the current Rx including ASA, Atenolol, and statin. With normal LVEF and no prior acute coronary syndrome and HTN well controlled, I do not think the addition of an ACE-I is necessary at this time.  In regard to physical exercise, I encourage him to remain active but to be vigilant regarding symptoms given the  level of activity he is pursuing (kickboxing).  We will adjust lipid lowering medication.    2. Hypercholesterolemia.   He is on Crestor 40 mg every day and Zetia 10 mg every day.  Check fasting lipid panel.    3. HTN.  Controlled on BB alone.  He notes it is controlled in ambulatory setting.  If future BP not well controlled, start Losartan 25 mg every day.    FOLLOW UP:  1 year    Recent Labs   Lab Test 06/25/20  1104 04/01/19  0735  02/06/15  0735 09/23/14   CHOL 224* 174   < > 146 155  155  155   HDL 54 54   < > 44 42*  42  42   * 100*   < > 80 85  85  85   TRIG 131 101   < > 110 139  139  139   CHOLHDLRATIO  --   --   --  3.3 3.7  3.7  3.7    < > = values in this interval not displayed.     ADDENDUM (6/25/2020): Lipids far from goal.  If patient compliant with medications, attempt Repatha 140 mg subcutaneous q 2 weeks.    Tomás West MD    Divisions of Cardiology  [place], [place]    CC  Patient Care Team:  Mee Park APRN CNP as PCP - General (Family Practice)  Abby Tran APRN CNP as Assigned PCP

## 2020-04-10 ENCOUNTER — PATIENT OUTREACH (OUTPATIENT)
Dept: SURGERY | Facility: CLINIC | Age: 60
End: 2020-04-10

## 2020-04-10 NOTE — PROGRESS NOTES
Called patient to check in with him at the request of Dr. Calderon. Patient states he is starting to have increased pain and like the fistula is filling up. Denies fever or chills. Advised patient to try to rotate and pull his seton to get any drainage surrounding the Seton to drain. Patient is also scheduled for a follow up appointment with Juliette Magallanes on Monday.

## 2020-04-13 ENCOUNTER — OFFICE VISIT (OUTPATIENT)
Dept: SURGERY | Facility: CLINIC | Age: 60
End: 2020-04-13
Payer: COMMERCIAL

## 2020-04-13 VITALS
HEIGHT: 72 IN | TEMPERATURE: 97.8 F | SYSTOLIC BLOOD PRESSURE: 134 MMHG | DIASTOLIC BLOOD PRESSURE: 79 MMHG | HEART RATE: 58 BPM | OXYGEN SATURATION: 94 % | BODY MASS INDEX: 28.27 KG/M2 | WEIGHT: 208.7 LBS

## 2020-04-13 DIAGNOSIS — K61.0 PERIANAL ABSCESS: Primary | ICD-10-CM

## 2020-04-13 RX ORDER — LIDOCAINE HYDROCHLORIDE AND EPINEPHRINE 10; 10 MG/ML; UG/ML
4 INJECTION, SOLUTION INFILTRATION; PERINEURAL ONCE
Status: DISCONTINUED | OUTPATIENT
Start: 2020-04-13 | End: 2020-06-19

## 2020-04-13 ASSESSMENT — PAIN SCALES - GENERAL: PAINLEVEL: EXTREME PAIN (8)

## 2020-04-13 ASSESSMENT — MIFFLIN-ST. JEOR: SCORE: 1804.66

## 2020-04-13 NOTE — PROGRESS NOTES
Colon and Rectal Surgery Follow-Up Clinic Note    RE: Tremayne Galarza  : 1960  STEFANIA: 2020    Tremayne Galarza is a very pleasant 60 year old male here for follow-up of perianal abscess.    Tremayne Galarza is a very pleasant 59 year old male who presents today for follow up for perianal fistula.    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     He developed two small boils on his buttock and hip with cultures showing staphylococcus aureus and he was treated with Augmentin.    MRI Pelvis 20:  1. 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  2. Near complete resolution of the previously seen posterior perianal  abscess, with minimal residual dilation of the superior fistula tract.  3. Skin and subcutaneous soft tissue thickening and enhancement in the  anterior left perineal region corresponding to the clinically visible  lesion.    Colonoscopy in  was normal.    He saw dermatology and was treated for folliculitis    Interval history: Tremayne reports that his folliculitis has resolved.  However, he developed a perianal swelling about 1 month ago that feels like it fills up and is not able to fully drained.  He then has a large amount of purulent drainage which he thinks is coming from the right side rather than from his seton.  This seems worse when he has solid stools and he feels that the pressure before bowel movement makes the drainage worse.  He has tried rotating his seton this resulted in some bleeding but did not seem to improve the pain or drainage.  No fevers or chills.  He is taking stool softeners to keep bowel movements soft.    Physical Examination: Exam was chaperoned by Eliezer Childers, EMT  /79 (BP Location: Left arm, Patient Position: Sitting, Cuff Size: Adult Large)   Pulse 58   Temp 97.8  F (36.6  C) (Oral)   Ht 6'    Wt 208 lb 11.2 oz   SpO2 94%   BMI 28.30 kg/m    General: Alert, oriented, in no acute distress, sitting comfortably  HEENT: Mucous membranes moist  Perianal external examination:  Yellow vessel loop seton present in the posterior midline with granulation tissue present.  There is induration in the right posterior position with a small external opening and purulent drainage present.    Digital rectal examination: Was deferred.    Anoscopy: Was deferred.    Assessment/Plan: 60 year old male with perianal fistula with seton in place.  He has an additional area of induration in the right posterior position with additional draining external sites present.  I made this opening slightly larger to help facilitate drainage.  This does seem to track towards the existing seton and fistula but is not adequately drained by these.  I am hoping that this provides symptomatic management for the time.  May need to consider EUA and possible repeat imaging in 3 to 4 weeks if symptoms persist.  Encouraged him to contact the clinic with any worsening symptoms, questions, or concerns. Patient's questions were answered to his stated satisfaction and he is in agreement with this plan.      Medical history:  Past Medical History:   Diagnosis Date     Chronic ischemic heart disease 9/4/2014    Overview:  3 stents to RCA 8/2014      Essential hypertension 2/11/2005    Overview:  LW Onset:  [id number]      Fistula      Hyperlipidemia 11/14/2005    Overview:  LW Onset:  14Nov05      Perirectal abscess      Staph infection        Surgical history:  Past Surgical History:   Procedure Laterality Date     BIOPSY       CARDIAC SURGERY       COLONOSCOPY N/A 12/30/2015    Procedure: COLONOSCOPY;  Surgeon: Duane, William Charles, MD;  Location:  OR     COLONOSCOPY WITH CO2 INSUFFLATION N/A 12/30/2015    Procedure: COLONOSCOPY WITH CO2 INSUFFLATION;  Surgeon: Duane, William Charles, MD;  Location:  OR     EXAM UNDER ANESTHESIA ANUS N/A 1/10/2020     Procedure: EXAM UNDER ANESTHESIA, ANUS, inscision and drainage of perirectal abscess, partial fistulotomy,  seton placement;  Surgeon: Camila Calderon MD;  Location: [facility]     FISTULOTOMY RECTUM N/A 1/10/2020    Procedure: partial FISTULOTOMY, RECTUM;  Surgeon: Camila Calderon MD;  [facility]: [facility] OR     PLACEMENT OF SETON RECTUM N/A 1/10/2020    Procedure: PLACEMENT, SETON STITCH;  Surgeon: Camila Calderon MD;  Location: [facility] OR       Problem list:  Patient Active Problem List    Diagnosis Date Noted     Staph infection 03/03/2020     Priority: Medium     History of nonmelanoma skin cancer 03/03/2020     Priority: Medium     History of dysplastic nevus 03/03/2020     Priority: Medium     Perianal abscess 01/09/2020     Priority: Medium     Added automatically from request for surgery [id number]       Anal fistula 01/09/2020     Priority: Medium     Added automatically from request for surgery [id number]       Verruca plantaris 04/11/2018     Priority: Medium     LUQ abdominal pain 03/27/2018     Priority: Medium     CAD (coronary artery disease) 08/05/2015     Priority: Medium     Status post coronary angiogram 03/26/2015     Priority: Medium     Nondependent alcohol abuse 02/03/2015     Priority: Medium     Chronic ischemic heart disease 09/04/2014     Priority: Medium     Overview:   3 stents to RCA 8/2014       Postsurgical percutaneous transluminal coronary angioplasty status 09/04/2014     Priority: Medium     Benign neoplasm of colon 06/05/2013     Priority: Medium     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 Provider to review and confirm.       HTN, goal below 140/90 02/11/2005     Priority: Medium     Overview:   LW Onset:  11Feb05       Obesity 02/11/2005     Priority: Medium     Overview:   LW Onset:  11Feb05        Allergic rhinitis 02/11/2005     Priority: Medium     Overview:   LW Onset:  [id number]         Medications:  Current Outpatient Medications   Medication Sig Dispense Refill     aspirin (CHILDRENS ASPIRIN) 81 MG chewable tablet Take 81 mg by mouth       atenolol (TENORMIN) 25 MG tablet Take 1 tablet (25 mg) by mouth daily 90 tablet 3     doxycycline hyclate (VIBRA-TABS) 100 MG tablet Take 1 tablet (100 mg) by mouth 2 times daily 14 tablet 0     ezetimibe (ZETIA) 10 MG tablet Take 1 tablet (10 mg) by mouth daily 90 tablet 0     fexofenadine (ALLEGRA) 60 MG tablet Take 0.5 tablets (30 mg) by mouth daily 60 tablet 3     mupirocin (BACTROBAN) 2 % external ointment Use 3 times per day for 3 weeks. Then the first week of every month for 6 months. 60 g 2     rosuvastatin (CRESTOR) 40 MG tablet Take 1 tablet (40 mg) by mouth daily 90 tablet 3     sildenafil (VIAGRA) 50 MG tablet Take 1 tablet (50 mg) by mouth daily as needed (erectile dysfunction) as needed for ED 10 tablet 0       Allergies:  No Known Allergies    Family history:  Family History   Problem Relation Age of Onset     Coronary Artery Disease Mother      Hypertension Mother      Cerebrovascular Disease Mother        Social history:  Social History     Tobacco Use     Smoking status: Never Smoker     Smokeless tobacco: Never Used   Substance Use Topics     Alcohol use: Yes     Marital status: .    Nursing Notes:   Eliezer Childers EMT  4/13/2020 10:48 AM  Signed  Chief Complaint   Patient presents with     RECHECK     Wound check.       Vitals:    04/13/20 1046   BP: 134/79   BP Location: Left arm   Patient Position: Sitting   Cuff Size: Adult Large   Pulse: 58   Temp: 97.8  F (36.6  C)   TempSrc: Oral   SpO2: 94%   Weight: 208 lb 11.2 oz   Height: 6'       Body mass index is 28.3 kg/m .      RAMAN Tolentino                         Procedures:  Prior to the start of the procedure and with procedural staff participation, I verbally confirmed the  patient s identity using two indicators, relevant allergies, that the procedure was appropriate and matched the consent or emergent situation, and that the correct equipment/implants were available. Immediately prior to starting the procedure I conducted the Time Out with the procedural staff and re-confirmed the patient s name, procedure, and site/side. (The Joint Commission universal protocol was followed.)  Yes    Sedation (Moderate or Deep): None    Effected area cleaned with betadine. 1% lidocaine with epineprhine used to infiltrate the area with good anethesia. Scapel used to make a cruciate incision and edges trimmed. A small amount of purulent drainage was removed. No gauze was needed for packing as area was small. External gauze dressing applied. Pt tolerated preocedure well. No additional loculations noted.    Total face to face time was 15 minutes, >50% counseling, in addition to the procedure time.    GENIA TaiC  Colon and Rectal Surgery  [facility]

## 2020-04-13 NOTE — NURSING NOTE
Chief Complaint   Patient presents with     RECHECK     Wound check.       Vitals:    04/13/20 1046   BP: 134/79   BP Location: Left arm   Patient Position: Sitting   Cuff Size: Adult Large   Pulse: 58   Temp: 97.8  F (36.6  C)   TempSrc: Oral   SpO2: 94%   Weight: 208 lb 11.2 oz   Height: 6'       Body mass index is 28.3 kg/m .      Eliezer Childers, EMT

## 2020-04-13 NOTE — LETTER
2020       RE: Tremayne Galarza  [address] [facility] [address]     Dear Colleague,    Thank you for referring your patient, Tremayne Galarza, to the [facility] COLON AND RECTAL SURGERY at [facility]. Please see a copy of my visit note below.    Colon and Rectal Surgery Follow-Up Clinic Note    RE: Tremayne Galarza  : 1960  STEFANIA: 2020    Tremayne Galarza is a very pleasant 60 year old male here for follow-up of perianal abscess.    Tremayne Galarza is a very pleasant 59 year old male who presents today for follow up for perianal fistula.    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     He developed two small boils on his buttock and hip with cultures showing staphylococcus aureus and he was treated with Augmentin.    MRI Pelvis 20:  1. 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  2. Near complete resolution of the previously seen posterior perianal  abscess, with minimal residual dilation of the superior fistula tract.  3. Skin and subcutaneous soft tissue thickening and enhancement in the  anterior left perineal region corresponding to the clinically visible  lesion.    Colonoscopy in  was normal.    He saw dermatology and was treated for folliculitis    Interval history: Tremayne reports that his folliculitis has resolved.  However, he developed a perianal swelling about 1 month ago that feels like it fills up and is not able to fully drained.  He then has a large amount of purulent drainage which he thinks is coming from the right side rather than from his seton.  This seems worse when he has solid stools and he feels that the pressure before bowel movement makes the drainage worse.  He has tried rotating his seton this resulted in some bleeding but did not seem to improve the pain or drainage.  No  fevers or chills.  He is taking stool softeners to keep bowel movements soft.    Physical Examination: Exam was chaperoned by RAMAN Tolentino  /79 (BP Location: Left arm, Patient Position: Sitting, Cuff Size: Adult Large)   Pulse 58   Temp 97.8  F (36.6  C) (Oral)   Ht 6'   Wt 208 lb 11.2 oz   SpO2 94%   BMI 28.30 kg/m    General: Alert, oriented, in no acute distress, sitting comfortably  HEENT: Mucous membranes moist  Perianal external examination:  Yellow vessel loop seton present in the posterior midline with granulation tissue present.  There is induration in the right posterior position with a small external opening and purulent drainage present.    Digital rectal examination: Was deferred.    Anoscopy: Was deferred.    Assessment/Plan: 60 year old male with perianal fistula with seton in place.  He has an additional area of induration in the right posterior position with additional draining external sites present.  I made this opening slightly larger to help facilitate drainage.  This does seem to track towards the existing seton and fistula but is not adequately drained by these.  I am hoping that this provides symptomatic management for the time.  May need to consider EUA and possible repeat imaging in 3 to 4 weeks if symptoms persist.  Encouraged him to contact the clinic with any worsening symptoms, questions, or concerns. Patient's questions were answered to his stated satisfaction and he is in agreement with this plan.      Medical history:  Past Medical History:   Diagnosis Date     Chronic ischemic heart disease 9/4/2014    Overview:  3 stents to RCA 8/2014      Essential hypertension 2/11/2005    Overview:  LW Onset:  [id number]      Fistula      Hyperlipidemia 11/14/2005    Overview:  LW Onset:  94Evt12      Perirectal abscess      Staph infection        Surgical history:  Past Surgical History:   Procedure Laterality Date     BIOPSY       CARDIAC SURGERY       COLONOSCOPY N/A  12/30/2015    Procedure: COLONOSCOPY;  Surgeon: Duane, William Charles, MD;  Location: [facility] OR     COLONOSCOPY WITH CO2 INSUFFLATION N/A 12/30/2015    Procedure: COLONOSCOPY WITH CO2 INSUFFLATION;  Surgeon: Duane, William Charles, MD;  Location: [facility] OR     EXAM UNDER ANESTHESIA ANUS N/A 1/10/2020    Procedure: EXAM UNDER ANESTHESIA, ANUS, inscision and drainage of perirectal abscess, partial fistulotomy,  seton placement;  Surgeon: Camila Calderon MD;  [facility]     FISTULOTOMY RECTUM N/A 1/10/2020    Procedure: partial FISTULOTOMY, RECTUM;  Surgeon: Camila Calderon MD;  [facility]     PLACEMENT OF SETON RECTUM N/A 1/10/2020    Procedure: PLACEMENT, SETON STITCH;  Surgeon: Camila Calderon MD;  Location: [facility] OR       Problem list:  Patient Active Problem List    Diagnosis Date Noted     Staph infection 03/03/2020     Priority: Medium     History of nonmelanoma skin cancer 03/03/2020     Priority: Medium     History of dysplastic nevus 03/03/2020     Priority: Medium     Perianal abscess 01/09/2020     Priority: Medium     Added automatically from request for surgery [id number]       Anal fistula 01/09/2020     Priority: Medium     Added automatically from request for surgery [id number]       Verruca plantaris 04/11/2018     Priority: Medium     LUQ abdominal pain 03/27/2018     Priority: Medium     CAD (coronary artery disease) 08/05/2015     Priority: Medium     Status post coronary angiogram 03/26/2015     Priority: Medium     Nondependent alcohol abuse 02/03/2015     Priority: Medium     Chronic ischemic heart disease 09/04/2014     Priority: Medium     Overview:   3 stents to RCA 8/2014       Postsurgical percutaneous transluminal coronary angioplasty status 09/04/2014     Priority: Medium     Benign neoplasm of colon 06/05/2013     Priority: Medium     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 Provider to review and confirm.       HTN, goal below 140/90 02/11/2005     Priority: Medium     Overview:   LW Onset:  11Feb05       Obesity 02/11/2005     Priority: Medium     Overview:   LW Onset:  11Feb05       Allergic rhinitis 02/11/2005     Priority: Medium     Overview:   LW Onset:  11Feb05         Medications:  Current Outpatient Medications   Medication Sig Dispense Refill     aspirin (CHILDRENS ASPIRIN) 81 MG chewable tablet Take 81 mg by mouth       atenolol (TENORMIN) 25 MG tablet Take 1 tablet (25 mg) by mouth daily 90 tablet 3     doxycycline hyclate (VIBRA-TABS) 100 MG tablet Take 1 tablet (100 mg) by mouth 2 times daily 14 tablet 0     ezetimibe (ZETIA) 10 MG tablet Take 1 tablet (10 mg) by mouth daily 90 tablet 0     fexofenadine (ALLEGRA) 60 MG tablet Take 0.5 tablets (30 mg) by mouth daily 60 tablet 3     mupirocin (BACTROBAN) 2 % external ointment Use 3 times per day for 3 weeks. Then the first week of every month for 6 months. 60 g 2     rosuvastatin (CRESTOR) 40 MG tablet Take 1 tablet (40 mg) by mouth daily 90 tablet 3     sildenafil (VIAGRA) 50 MG tablet Take 1 tablet (50 mg) by mouth daily as needed (erectile dysfunction) as needed for ED 10 tablet 0       Allergies:  No Known Allergies    Family history:  Family History   Problem Relation Age of Onset     Coronary Artery Disease Mother      Hypertension Mother      Cerebrovascular Disease Mother        Social history:  Social History     Tobacco Use     Smoking status: Never Smoker     Smokeless tobacco: Never Used   Substance Use Topics     Alcohol use: Yes     Marital status: .    Nursing Notes:   Eliezer Childers, EMT  4/13/2020 10:48 AM  Signed  Chief Complaint   Patient presents with     RECHECK     Wound check.       Vitals:    04/13/20 1046   BP: 134/79   BP Location: Left arm   Patient Position: Sitting   Cuff Size: Adult Large    Pulse: 58   Temp: 97.8  F (36.6  C)   TempSrc: Oral   SpO2: 94%   Weight: 208 lb 11.2 oz   Height: 6'       Body mass index is 28.3 kg/m .      RAMAN Tolentino                         Procedures:  Prior to the start of the procedure and with procedural staff participation, I verbally confirmed the patient s identity using two indicators, relevant allergies, that the procedure was appropriate and matched the consent or emergent situation, and that the correct equipment/implants were available. Immediately prior to starting the procedure I conducted the Time Out with the procedural staff and re-confirmed the patient s name, procedure, and site/side. (The Joint Commission universal protocol was followed.)  Yes    Sedation (Moderate or Deep): None    Effected area cleaned with betadine. 1% lidocaine with epineprhine used to infiltrate the area with good anethesia. Scapel used to make a cruciate incision and edges trimmed. A small amount of purulent drainage was removed. No gauze was needed for packing as area was small. External gauze dressing applied. Pt tolerated preocedure well. No additional loculations noted.    Total face to face time was 15 minutes, >50% counseling, in addition to the procedure time.    Juliette Giles, NP-C  Colon and Rectal Surgery  [facility]      Again, thank you for allowing me to participate in the care of your patient.      Sincerely,    SYLVIA Hendrix CNP

## 2020-04-25 ENCOUNTER — MYC MEDICAL ADVICE (OUTPATIENT)
Dept: DERMATOLOGY | Facility: CLINIC | Age: 60
End: 2020-04-25

## 2020-05-20 DIAGNOSIS — K61.0 PERIANAL ABSCESS: Primary | ICD-10-CM

## 2020-05-27 ENCOUNTER — ANCILLARY PROCEDURE (OUTPATIENT)
Dept: MRI IMAGING | Facility: CLINIC | Age: 60
End: 2020-05-27
Attending: COLON & RECTAL SURGERY
Payer: COMMERCIAL

## 2020-05-27 DIAGNOSIS — K61.0 PERIANAL ABSCESS: ICD-10-CM

## 2020-05-27 RX ORDER — GADOBUTROL 604.72 MG/ML
10 INJECTION INTRAVENOUS ONCE
Status: COMPLETED | OUTPATIENT
Start: 2020-05-27 | End: 2020-05-27

## 2020-05-27 RX ADMIN — GADOBUTROL 10 ML: 604.72 INJECTION INTRAVENOUS at 15:49

## 2020-06-02 ENCOUNTER — PATIENT OUTREACH (OUTPATIENT)
Dept: SURGERY | Facility: CLINIC | Age: 60
End: 2020-06-02

## 2020-06-02 DIAGNOSIS — K61.0 PERIANAL ABSCESS: Primary | ICD-10-CM

## 2020-06-02 NOTE — PROGRESS NOTES
Instructed pt on the plan for EUA   Render Post-Care Instructions In Note?: yes Number Of Freeze-Thaw Cycles: 2 freeze-thaw cycles Duration Of Freeze Thaw-Cycle (Seconds): 15 Post-Care Instructions: Post op instructions reviewed and written copy offered. Render Note In Bullet Format When Appropriate: No Detail Level: Detailed Consent: Oral informed patient consent was obtained. Discussed treatment options and patient had all questions answered to satisfaction prior to treatment. Risks including, but not limited to: pain, infection, bleeding, scar, change of skin texture and/or color, nerve damage, blisters, allergy, and recurrence of lesion.

## 2020-06-05 ENCOUNTER — PATIENT OUTREACH (OUTPATIENT)
Dept: SURGERY | Facility: CLINIC | Age: 60
End: 2020-06-05

## 2020-06-05 ENCOUNTER — TELEPHONE (OUTPATIENT)
Dept: SURGERY | Facility: CLINIC | Age: 60
End: 2020-06-05

## 2020-06-05 NOTE — TELEPHONE ENCOUNTER
----- Message from Abby Anderson RN sent at 6/5/2020 12:10 PM CDT -----  Regarding: RE: surgery appt  Dr. Calderon is okay with either doing lydia at [facility] or [facility] first case on Friday. Ideally all cases done on Friday.   ----- Message -----  From: Abby Anderson RN  Sent: 6/5/2020  11:21 AM CDT  To: Camila Calderon MD, Cornelius Chavez LPN, #  Subject: RE: surgery appt                                 Blair Porras. Tana can you help with this? Pt's case should be done soon. Worsen symptoms with an abscess to be done at [facility]. Dr. Calderon is in the OR on the 12th already   ----- Message -----  From: Chiquita Lange  Sent: 6/5/2020  11:13 AM CDT  To: Camila Calderon MD, Cornelius Chavez LPN, #  Subject: RE: surgery appt                                 He is an [facility] case. I don't know if I should be flipping him to another site at this time. Please advise.  ----- Message -----  From: Abby Anderson RN  Sent: 6/5/2020  11:11 AM CDT  To: Camila Calderon MD, Cornelius Chavez LPN, #  Subject: RE: surgery appt                                 Is the 12th full still? If not we should try and add on for that day.   ----- Message -----  From: Chiquita Lange  Sent: 6/5/2020  11:08 AM CDT  To: Camila Calderon MD, Cornelius Chavez LPN, #  Subject: RE: surgery appt                                 This is a tier 1 patient when should I get him scheduled?    Chiquita Garza  ----- Message -----  From: Cornelius Chavez LPN  Sent: 1/9/2020  10:15 AM CDT  To: Camila Calderon MD, #  Subject: RE: surgery appt                                 Yes, case request is waiting for second sign from Dr. Calderon      Thank you,    Cornelius  ----- Message -----  From: Alyssia Iglesias  Sent: 1/9/2020  10:06 AM CST  To: Cornelius Chavez LPN  Subject: RE: surgery appt                                 Brennon Shields,    Will I be getting a Case Request for this EUA so I can schedule it for tomorrow?     Also, there may not be  any more room in either the [facility] or [facility] - how long is this EUA case?Please let me know.    Thank-you!  Alyssia  ----- Message -----  From: Cornelius Chavez LPN  Sent: 1/8/2020   6:27 PM CST  To: Cornelius Chavez LPN, Alyssia Iglesias  Subject: surgery appt                                     Hi FRANK Cade would like this Pt scheduled for EUA on Friday 1/10/2020    PAC done with [facility] on 1/8/2020      Thank you,    Cornelius

## 2020-06-05 NOTE — TELEPHONE ENCOUNTER
Per task, this writer spoke to the patient about surgery next Friday, 06/12/2020. Patient is not available that day. He does have questions about appointments at [place] this writer will ask RN[facility] to call patient to discuss.

## 2020-06-05 NOTE — PROGRESS NOTES
Pt had a question about the fleet enemas. I told him the fleets would have to be done the morning of surgery. He stated understanding

## 2020-06-08 ENCOUNTER — TELEPHONE (OUTPATIENT)
Dept: SURGERY | Facility: CLINIC | Age: 60
End: 2020-06-08

## 2020-06-08 DIAGNOSIS — Z11.59 ENCOUNTER FOR SCREENING FOR OTHER VIRAL DISEASES: Primary | ICD-10-CM

## 2020-06-08 NOTE — TELEPHONE ENCOUNTER
----- Message from Abby Anderson RN sent at 6/5/2020  2:11 PM CDT -----  Regarding: RE: surgery appt  I can't find my message now. I cleared the confusion about the enemas. He just needs to do them the morning before surgery. Dr. Calderon is okay to do cases on 6/19 if you can find a time (I asked her)    Abby Garza   ----- Message -----  From: Chiquita Lange  Sent: 6/5/2020   1:13 PM CDT  To: Camila Calderon MD, Cornelius Chavez LPN, #  Subject: RE: surgery appt                                 Next Friday does not work for him. He has questions about appts needed at [place]. I am not sure what that is about. Could you call him to discuss?    Chiquita Garza  ----- Message -----  From: Abby Anderson RN  Sent: 6/5/2020  12:10 PM CDT  To: Camila Calderon MD, Cornelius Chavez LPN, #  Subject: RE: surgery appt                                 Dr. Calderon is okay with either doing lydia at [facility] or [facility] first case on Friday. Ideally all cases done on Friday.   ----- Message -----  From: Abby Anderson RN  Sent: 6/5/2020  11:21 AM CDT  To: Camila Calderon MD, Cornelius Chavez LPN, #  Subject: RE: surgery appt                                 Blair Porras. Tana can you help with this? Pt's case should be done soon. Worsen symptoms with an abscess to be done at ASC. Dr. Calderon is in the OR on the 12th already   ----- Message -----  From: Chiquita Lange  Sent: 6/5/2020  11:13 AM CDT  To: Camila Calderon MD, Cornelius Chavez LPN, #  Subject: RE: surgery appt                                 He is an [facility] case. I don't know if I should be flipping him to another site at this time. Please advise.  ----- Message -----  From: Abby Anderson RN  Sent: 6/5/2020  11:11 AM CDT  To: Camila Calderon MD, Cornelius Chavez LPN, #  Subject: RE: surgery appt                                 Is the 12th full still? If not we should try and add on for that day.   ----- Message -----  From:  Chiquita Lange  Sent: 6/5/2020  11:08 AM CDT  To: Camila Calderon MD, Cornelius Chavez LPN, #  Subject: RE: surgery appt                                 This is a tier 1 patient when should I get him scheduled?    ThanksChiquita  ----- Message -----  From: Cornelius Chavez LPN  Sent: 1/9/2020  10:15 AM CDT  To: Camila Calderon MD, #  Subject: RE: surgery appt                                 Yes, case request is waiting for second sign from Dr. Calderon      Thank you,    Cornelius  ----- Message -----  From: Alyssia Iglesias  Sent: 1/9/2020  10:06 AM CST  To: Cornelius Chavez LPN  Subject: RE: surgery appt                                 Brennon Shields,    Will I be getting a Case Request for this EUA so I can schedule it for tomorrow?     Also, there may not be any more room in either the [facility] or [facility] - how long is this EUA case?Please let me know.    Thank-you!  Alyssia  ----- Message -----  From: Cornelius Chavez LPN  Sent: 1/8/2020   6:27 PM CST  To: Cornelius Chavez LPN, Alyssia Iglesias  Subject: surgery appt                                     Hi FRANK Cade would like this Pt scheduled for EUA on Friday 1/10/2020    PAC done with [facility] on 1/8/2020      Thank you,    Cornelius

## 2020-06-08 NOTE — TELEPHONE ENCOUNTER
Patient is scheduled for surgery with Dr. Camila Calderon      Spoke with: Tremayne    Date of Surgery: 06/19/2020    [facility]  [address] [place] [address]    Informed patient they will need an adult  Yes    Pre-op with surgeon (if applicable): Not required    H&P: Scheduled with Mee Park or a partner    Additional imaging/appointments: Not required    Surgery packet: Id[place]     Additional comments: Post op on 06/25/2020 with Juliette Giles NP

## 2020-06-09 DIAGNOSIS — E78.00 HYPERCHOLESTEREMIA: ICD-10-CM

## 2020-06-10 NOTE — PROGRESS NOTES
[address] [address]  [phone]  Dept: [phone]    PRE-OP EVALUATION:  Today's date: 2020    Tremayne Galarza (: 1960) presents for pre-operative evaluation assessment as requested by Camila Diego MD .  He requires evaluation and anesthesia risk assessment prior to undergoing surgery/procedure for treatment of Colon Rectal surgery    Proposed Surgery/ Procedure: Colon Rectal surgery  Date of Surgery/ Procedure: 2020  Time of Surgery/ Procedure: 7:15am  Hospital/Surgical Facility: [facility]  Fax number for surgical facility: in [facility]  Primary Physician: Mee Park  Type of Anesthesia Anticipated: General    Patient has a Health Care Directive or Living Will:  NO    1. NO - Do you have a history of heart attack, stroke, stent, bypass or surgery on an artery in the head, neck, heart or legs?  2. NO - Do you ever have any pain or discomfort in your chest?  3. NO - Do you have a history of  Heart Failure?  4. NO - Are you troubled by shortness of breath when: walking on the level, up a slight hill or at night?  5. NO - Do you currently have a cold, bronchitis or other respiratory infection?  6. NO - Do you have a cough, shortness of breath or wheezing?  7. NO - Do you sometimes get pains in the calves of your legs when you walk?  8. NO - Do you or anyone in your family have previous history of blood clots?  9. NO - Do you or does anyone in your family have a serious bleeding problem such as prolonged bleeding following surgeries or cuts?  10. NO - Have you ever had problems with anemia or been told to take iron pills?  11. NO - Have you had any abnormal blood loss such as black, tarry or bloody stools, or abnormal vaginal bleeding?  12. NO - Have you ever had a blood transfusion?  13. NO - Have you or any of your relatives ever had problems with anesthesia?  14. NO - Do you have sleep apnea, excessive snoring or daytime  drowsiness?  15. NO - Do you have any prosthetic heart valves?  16. NO - Do you have prosthetic joints?  17. NO - Is there any chance that you may be pregnant?      HPI:     HPI related to upcoming procedure: undergoing surgery/procedure for treatment of Colon Rectal surgery  Has been struggling with this for at least 6 months with hx of colorectal abscess vs fistula         See problem list for active medical problems.  Problems all longstanding and stable, except as noted/documented.  See ROS for pertinent symptoms related to these conditions.    HYPERTENSION - Patient has longstanding history of HTN , currently denies any symptoms referable to elevated blood pressure. Specifically denies chest pain, palpitations, dyspnea, orthopnea, PND or peripheral edema. Blood pressure readings have been in normal range. Current medication regimen is as listed below. Patient denies any side effects of medication.       MEDICAL HISTORY:     Patient Active Problem List    Diagnosis Date Noted     Staph infection 03/03/2020     Priority: Medium     History of nonmelanoma skin cancer 03/03/2020     Priority: Medium     History of dysplastic nevus 03/03/2020     Priority: Medium     Perianal abscess 01/09/2020     Priority: Medium     Added automatically from request for surgery [id number]       Anal fistula 01/09/2020     Priority: Medium     Added automatically from request for surgery [id number]       Verruca plantaris 04/11/2018     Priority: Medium     LUQ abdominal pain 03/27/2018     Priority: Medium     CAD (coronary artery disease) 08/05/2015     Priority: Medium     Status post coronary angiogram 03/26/2015     Priority: Medium     Nondependent alcohol abuse 02/03/2015     Priority: Medium     Chronic ischemic heart disease 09/04/2014     Priority: Medium     Overview:   3 stents to RCA 8/2014       Postsurgical percutaneous transluminal coronary angioplasty status 09/04/2014     Priority: Medium     Benign neoplasm of colon  06/05/2013     Priority: Medium     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 Provider to review and confirm.       HTN, goal below 140/90 02/11/2005     Priority: Medium     Overview:   LW Onset:  [id number]       Obesity 02/11/2005     Priority: Medium     Overview:   LW Onset:  11Feb05       Allergic rhinitis 02/11/2005     Priority: Medium     Overview:   LW Onset:  11Feb05        Past Medical History:   Diagnosis Date     Chronic ischemic heart disease 9/4/2014    Overview:  3 stents to RCA 8/2014      Essential hypertension 2/11/2005    Overview:  LW Onset:  11Feb05      Fistula      Hyperlipidemia 11/14/2005    Overview:  LW Onset:  14Nov05      Perirectal abscess      Staph infection      Past Surgical History:   Procedure Laterality Date     BIOPSY       CARDIAC SURGERY       COLONOSCOPY N/A 12/30/2015    Procedure: COLONOSCOPY;  Surgeon: Duane, William Charles, MD;  Location: [facility] OR     COLONOSCOPY WITH CO2 INSUFFLATION N/A 12/30/2015    Procedure: COLONOSCOPY WITH CO2 INSUFFLATION;  Surgeon: Duane, William Charles, MD;  Location: [facility] OR     EXAM UNDER ANESTHESIA ANUS N/A 1/10/2020    Procedure: EXAM UNDER ANESTHESIA, ANUS, inscision and drainage of perirectal abscess, partial fistulotomy,  seton placement;  Surgeon: Camila Calderon MD;  Location: [facility] OR     FISTULOTOMY RECTUM N/A 1/10/2020    Procedure: partial FISTULOTOMY, RECTUM;  Surgeon: Camila Calderon MD;  Location: [facility] OR     PLACEMENT OF SETON RECTUM N/A 1/10/2020    Procedure: PLACEMENT, SETON STITCH;  Surgeon: Camila Calderon MD;  Location: [facility] OR     Current Outpatient Medications   Medication Sig Dispense Refill     aspirin (CHILDRENS ASPIRIN) 81 MG chewable tablet Take 81 mg by mouth       atenolol (TENORMIN) 25 MG tablet Take 1 tablet (25 mg) by  mouth daily 90 tablet 3     fexofenadine (ALLEGRA) 60 MG tablet Take 0.5 tablets (30 mg) by mouth daily 60 tablet 3     mupirocin (BACTROBAN) 2 % external ointment Use 3 times per day for 3 weeks. Then the first week of every month for 6 months. 60 g 2     rosuvastatin (CRESTOR) 40 MG tablet Take 1 tablet (40 mg) by mouth daily 90 tablet 3     sildenafil (VIAGRA) 50 MG tablet Take 1 tablet (50 mg) by mouth daily as needed (erectile dysfunction) as needed for ED 10 tablet 0     ezetimibe (ZETIA) 10 MG tablet TAKE ONE TABLET BY MOUTH ONCE DAILY 90 tablet 0     OTC products: no recent use of OTC ASA, NSAIDS or Steroids and no use of herbal medications or other supplements    No Known Allergies   Latex Allergy: NO    Social History     Tobacco Use     Smoking status: Never Smoker     Smokeless tobacco: Never Used   Substance Use Topics     Alcohol use: Yes     History   Drug Use No       REVIEW OF SYSTEMS:   CONSTITUTIONAL: NEGATIVE for fever, chills, change in weight  INTEGUMENTARY/SKIN: NEGATIVE for rash   ENT/MOUTH: NEGATIVE for ear, mouth and throat problems  RESP: NEGATIVE for significant cough or SOB  CV: NEGATIVE for chest pain, palpitations or peripheral edema  GI: POSITIVE for rectal discharge  and NEGATIVE for jaundice, melena, nausea, poor appetite, vomiting and weight loss  : NEGATIVE for frequency, dysuria, or hematuria  MUSCULOSKELETAL: NEGATIVE for significant arthralgias or myalgia  NEURO: NEGATIVE for weakness, dizziness or paresthesias  ENDOCRINE: NEGATIVE for temperature intolerance, skin/hair changes  HEME: NEGATIVE for bleeding problems  PSYCHIATRIC: NEGATIVE for changes in mood or affect    EXAM:   /60 (BP Location: Right arm, Patient Position: Sitting, Cuff Size: Adult Regular)   Pulse 67   Temp 97.8  F (36.6  C) (Temporal)   Ht 1.829 m (6')   Wt 94.3 kg (208 lb)   SpO2 97%   BMI 28.21 kg/m      GENERAL APPEARANCE: alert, active and no distress     EYES: Eyes grossly normal to  inspection and conjunctivae and sclerae normal     HENT: ear canals and TM's normal and nose and mouth without ulcers or lesions     NECK: no adenopathy     RESP: lungs clear to auscultation - no rales, rhonchi or wheezes     CV: regular rates and rhythm, no murmur, click or rub and no irregular beats     ABDOMEN:  soft, nontender, no HSM or masses and bowel sounds normal     MS: extremities normal- no gross deformities noted, no evidence of inflammation in joints, FROM in all extremities.     SKIN: no suspicious lesions or rashes     NEURO: Normal strength and tone, sensory exam grossly normal, mentation intact and speech normal     PSYCH: mentation appears normal. and affect normal/bright     LYMPHATICS: No cervical adenopathy    DIAGNOSTICS:   EKG: appears normal, NSR, normal axis, normal intervals, no acute ST/T changes c/w ischemia, no LVH by voltage criteria    Results for orders placed or performed in visit on 06/11/20   Basic metabolic panel     Status: None   Result Value Ref Range    Sodium 140 133 - 144 mmol/L    Potassium 4.3 3.4 - 5.3 mmol/L    Chloride 107 94 - 109 mmol/L    Carbon Dioxide 29 20 - 32 mmol/L    Anion Gap 4 3 - 14 mmol/L    Glucose 96 70 - 99 mg/dL    Urea Nitrogen 17 7 - 30 mg/dL    Creatinine 0.80 0.66 - 1.25 mg/dL    GFR Estimate >90 >60 mL/min/[1.73_m2]    GFR Estimate If Black >90 >60 mL/min/[1.73_m2]    Calcium 9.1 8.5 - 10.1 mg/dL   CBC with platelets     Status: Abnormal   Result Value Ref Range    WBC 4.9 4.0 - 11.0 10e9/L    RBC Count 4.55 4.4 - 5.9 10e12/L    Hemoglobin 14.6 13.3 - 17.7 g/dL    Hematocrit 42.5 40.0 - 53.0 %    MCV 93 78 - 100 fl    MCH 32.1 26.5 - 33.0 pg    MCHC 34.4 31.5 - 36.5 g/dL    RDW 12.2 10.0 - 15.0 %    Platelet Count 139 (L) 150 - 450 10e9/L       IMPRESSION:   Reason for surgery/procedure: undergoing surgery/procedure for treatment of Colon Rectal surgery for colorectal fistula       Diagnosis/reason for consult: preop evaluation     The proposed  surgical procedure is considered INTERMEDIATE risk.    REVISED CARDIAC RISK INDEX  The patient has the following serious cardiovascular risks for perioperative complications such as (MI, PE, VFib and 3  AV Block):  No serious cardiac risks  INTERPRETATION: 0 risks: Class I (very low risk - 0.4% complication rate)    The patient has the following additional risks for perioperative complications:  The 10-year ASCVD risk score (Von HRADIN Jr., et al., 2013) is: 6.2%    Values used to calculate the score:      Age: 60 years      Sex: Male      Is Non- : No      Diabetic: No      Tobacco smoker: No      Systolic Blood Pressure: 120 mmHg      Is BP treated: Yes      HDL Cholesterol: 54 mg/dL      Total Cholesterol: 174 mg/dL    Tremayne was seen today for pre-op exam.    Diagnoses and all orders for this visit:    Preop general physical exam  -     EKG 12-lead complete w/read - Clinics  -     Basic metabolic panel  -     CBC with platelets    Anal fistula  -     EKG 12-lead complete w/read - Clinics  -     Basic metabolic panel  -     CBC with platelets    HTN, goal below 140/90  -     EKG 12-lead complete w/read - Clinics  -     Basic metabolic panel        RECOMMENDATIONS:     Before your surgery:  10 days before: stop all over the counter supplements  1 week before: stop aspirin if you are taking aspirin.   stop ibuprofen, naproxen or similar antiinflammatory medications. You may use Tylenol for pain or headache.     On the day of your surgery:  Do not take any medication the morning of your surgery.     After surgery:    You may resume all your medications after the surgery unless your surgeon instructs you otherwise      APPROVAL GIVEN to proceed with proposed procedure, without further diagnostic evaluation       Signed Electronically by: SYLVIA Nair CNP    Copy of this evaluation report is provided to requesting physician.    [facility] Preop Guidelines    Revised Cardiac Risk Index

## 2020-06-10 NOTE — PATIENT INSTRUCTIONS
PLAN:   1.   Before your surgery:  10 days before: stop all over the counter supplements  1 week before: stop aspirin if you are taking aspirin.   stop ibuprofen, naproxen or similar antiinflammatory medications. You may use Tylenol for pain or headache.     On the day of your surgery:  Do not take any medication the morning of your surgery.     After surgery:    You may resume all your medications after the surgery unless your surgeon instructs you otherwise    2.  Orders Placed This Encounter   Procedures     Basic metabolic panel     CBC with platelets     EKG 12-lead complete w/read - Clinics       3. Patient needs to follow up in if no improvement,or sooner if worsening of symptoms or other symptoms develop.  Will follow up and/or notify patient of  results via My Chart to determine further need for followup      Before Your Surgery      Call your surgeon if there is any change in your health. This includes signs of a cold or flu (such as a sore throat, runny nose, cough, rash or fever).    Do not smoke, drink alcohol or take over the counter medicine (unless your surgeon or primary care doctor tells you to) for the 24 hours before and after surgery.    If you take prescribed drugs: Follow your doctor s orders about which medicines to take and which to stop until after surgery.    Eating and drinking prior to surgery: follow the instructions from your surgeon    Take a shower or bath the night before surgery. Use the soap your surgeon gave you to gently clean your skin. If you do not have soap from your surgeon, use your regular soap. Do not shave or scrub the surgery site.  Wear clean pajamas and have clean sheets on your bed.

## 2020-06-11 ENCOUNTER — OFFICE VISIT (OUTPATIENT)
Dept: PEDIATRICS | Facility: CLINIC | Age: 60
End: 2020-06-11
Payer: COMMERCIAL

## 2020-06-11 VITALS
BODY MASS INDEX: 28.17 KG/M2 | SYSTOLIC BLOOD PRESSURE: 120 MMHG | TEMPERATURE: 97.8 F | HEIGHT: 72 IN | WEIGHT: 208 LBS | OXYGEN SATURATION: 97 % | HEART RATE: 67 BPM | DIASTOLIC BLOOD PRESSURE: 60 MMHG

## 2020-06-11 DIAGNOSIS — Z01.818 PREOP GENERAL PHYSICAL EXAM: Primary | ICD-10-CM

## 2020-06-11 DIAGNOSIS — I10 HTN, GOAL BELOW 140/90: ICD-10-CM

## 2020-06-11 DIAGNOSIS — K60.30 ANAL FISTULA: ICD-10-CM

## 2020-06-11 LAB
ANION GAP SERPL CALCULATED.3IONS-SCNC: 4 MMOL/L (ref 3–14)
BUN SERPL-MCNC: 17 MG/DL (ref 7–30)
CALCIUM SERPL-MCNC: 9.1 MG/DL (ref 8.5–10.1)
CHLORIDE SERPL-SCNC: 107 MMOL/L (ref 94–109)
CO2 SERPL-SCNC: 29 MMOL/L (ref 20–32)
CREAT SERPL-MCNC: 0.8 MG/DL (ref 0.66–1.25)
ERYTHROCYTE [DISTWIDTH] IN BLOOD BY AUTOMATED COUNT: 12.2 % (ref 10–15)
GFR SERPL CREATININE-BSD FRML MDRD: >90 ML/MIN/{1.73_M2}
GLUCOSE SERPL-MCNC: 96 MG/DL (ref 70–99)
HCT VFR BLD AUTO: 42.5 % (ref 40–53)
HGB BLD-MCNC: 14.6 G/DL (ref 13.3–17.7)
MCH RBC QN AUTO: 32.1 PG (ref 26.5–33)
MCHC RBC AUTO-ENTMCNC: 34.4 G/DL (ref 31.5–36.5)
MCV RBC AUTO: 93 FL (ref 78–100)
PLATELET # BLD AUTO: 139 10E9/L (ref 150–450)
POTASSIUM SERPL-SCNC: 4.3 MMOL/L (ref 3.4–5.3)
RBC # BLD AUTO: 4.55 10E12/L (ref 4.4–5.9)
SODIUM SERPL-SCNC: 140 MMOL/L (ref 133–144)
WBC # BLD AUTO: 4.9 10E9/L (ref 4–11)

## 2020-06-11 PROCEDURE — 93000 ELECTROCARDIOGRAM COMPLETE: CPT | Performed by: NURSE PRACTITIONER

## 2020-06-11 PROCEDURE — 80048 BASIC METABOLIC PNL TOTAL CA: CPT | Performed by: NURSE PRACTITIONER

## 2020-06-11 PROCEDURE — 85027 COMPLETE CBC AUTOMATED: CPT | Performed by: NURSE PRACTITIONER

## 2020-06-11 PROCEDURE — 36415 COLL VENOUS BLD VENIPUNCTURE: CPT | Performed by: NURSE PRACTITIONER

## 2020-06-11 PROCEDURE — 99214 OFFICE O/P EST MOD 30 MIN: CPT | Performed by: NURSE PRACTITIONER

## 2020-06-11 RX ORDER — EZETIMIBE 10 MG/1
TABLET ORAL
Qty: 90 TABLET | Refills: 0 | Status: SHIPPED | OUTPATIENT
Start: 2020-06-11 | End: 2020-09-11

## 2020-06-11 ASSESSMENT — MIFFLIN-ST. JEOR: SCORE: 1801.48

## 2020-06-11 NOTE — NURSING NOTE
Chief Complaint   Patient presents with     Pre-Op Exam     DOS:6/19/2020       Initial /60 (BP Location: Right arm, Patient Position: Sitting, Cuff Size: Adult Regular)   Pulse 67   Temp 97.8  F (36.6  C) (Temporal)   Ht 1.829 m (6')   Wt 94.3 kg (208 lb)   SpO2 97%   BMI 28.21 kg/m   Estimated body mass index is 28.21 kg/m  as calculated from the following:    Height as of this encounter: 1.829 m (6').    Weight as of this encounter: 94.3 kg (208 lb).  Medication Reconciliation: complete      INDIA Sousa

## 2020-06-12 NOTE — RESULT ENCOUNTER NOTE
Robina Galarza,    Attached are your test results.  -Normal red blood cell (hgb) levels, normal white blood cell count and low platelet levels.  ADVISE: will monitor this as was normal just 5 months ago   Are you taking a lot of ibuprofen?  -Kidney function is normal (Cr, GFR), Sodium is normal, Potassium is normal, Calcium is normal, Glucose is normal.    Please contact us if you have any questions.    Mee Park, CNP

## 2020-06-17 DIAGNOSIS — Z11.59 ENCOUNTER FOR SCREENING FOR OTHER VIRAL DISEASES: ICD-10-CM

## 2020-06-17 LAB
SARS-COV-2 RNA SPEC QL NAA+PROBE: NOT DETECTED
SPECIMEN SOURCE: NORMAL

## 2020-06-17 PROCEDURE — U0003 INFECTIOUS AGENT DETECTION BY NUCLEIC ACID (DNA OR RNA); SEVERE ACUTE RESPIRATORY SYNDROME CORONAVIRUS 2 (SARS-COV-2) (CORONAVIRUS DISEASE [COVID-19]), AMPLIFIED PROBE TECHNIQUE, MAKING USE OF HIGH THROUGHPUT TECHNOLOGIES AS DESCRIBED BY CMS-2020-01-R: HCPCS | Mod: 90 | Performed by: FAMILY MEDICINE

## 2020-06-17 PROCEDURE — 99000 SPECIMEN HANDLING OFFICE-LAB: CPT | Performed by: FAMILY MEDICINE

## 2020-06-18 ENCOUNTER — ANESTHESIA EVENT (OUTPATIENT)
Dept: SURGERY | Facility: AMBULATORY SURGERY CENTER | Age: 60
End: 2020-06-18

## 2020-06-18 ASSESSMENT — LIFESTYLE VARIABLES: TOBACCO_USE: 0

## 2020-06-18 NOTE — ANESTHESIA PREPROCEDURE EVALUATION
Anesthesia Pre-Procedure Evaluation    Patient: Tremayne Galraza   MRN:     [id number] Gender:   male   Age:    60 year old :      1960        Preoperative Diagnosis: Perianal abscess [K61.0]   Procedure(s):  EXAM UNDER ANESTHESIA     LABS:  CBC:   Lab Results   Component Value Date    WBC 4.9 2020    WBC 8.6 2019    HGB 14.6 2020    HGB 14.7 2019    HCT 42.5 2020    HCT 44.0 2019     (L) 2020     2019     BMP:   Lab Results   Component Value Date     2020     2019    POTASSIUM 4.3 2020    POTASSIUM 3.9 2019    CHLORIDE 107 2020    CHLORIDE 106 2019    CO2 29 2020    CO2 30 2019    BUN 17 2020    BUN 20 2019    CR 0.80 2020    CR 0.93 2019    GLC 96 2020     (H) 2019     COAGS:   Lab Results   Component Value Date    INR 1.0 2014     POC: No results found for: BGM, HCG, HCGS  OTHER:   Lab Results   Component Value Date    ARMAND 9.1 2020    ALBUMIN 4.0 2019    PROTTOTAL 7.8 2019    ALT 36 2019    AST 25 2019    ALKPHOS 64 2019    BILITOTAL 0.6 2019    LIPASE 236 2018    TSH 1.78 2015        Preop Vitals    BP Readings from Last 3 Encounters:   20 120/60   20 134/79   20 (!) 142/78    Pulse Readings from Last 3 Encounters:   20 67   20 58   20 61      Resp Readings from Last 3 Encounters:   01/10/20 12   12/30/15 16   03/26/15 16    SpO2 Readings from Last 3 Encounters:   20 97%   20 94%   20 96%      Temp Readings from Last 1 Encounters:   20 36.6  C (97.8  F) (Temporal)    Ht Readings from Last 1 Encounters:   20 1.829 m (6')      Wt Readings from Last 1 Encounters:   20 94.3 kg (208 lb)    Estimated body mass index is 28.21 kg/m  as calculated from the following:    Height as of 20: 1.829 m (6').    Weight as of  "6/11/20: 94.3 kg (208 lb).     LDA:  Right Radial Interventional Cardiac Procedure Access (Active)   Number of days: 1911       Open Drain Rectal  (Active)   Number of days: 160        Past Medical History:   Diagnosis Date     Chronic ischemic heart disease 9/4/2014    Overview:  3 stents to RCA 8/2014      Essential hypertension 2/11/2005    Overview:  LW Onset:  [id number]      Fistula      Hyperlipidemia 11/14/2005    Overview:  LW Onset:  [id number]      Perirectal abscess      Staph infection       Past Surgical History:   Procedure Laterality Date     BIOPSY       CARDIAC SURGERY       COLONOSCOPY N/A 12/30/2015    Procedure: COLONOSCOPY;  Surgeon: Duane, William Charles, MD;  Location: [facility]     COLONOSCOPY WITH CO2 INSUFFLATION N/A 12/30/2015    Procedure: COLONOSCOPY WITH CO2 INSUFFLATION;  Surgeon: Duane, William Charles, MD;  Location: [facility] OR     EXAM UNDER ANESTHESIA ANUS N/A 1/10/2020    Procedure: EXAM UNDER ANESTHESIA, ANUS, inscision and drainage of perirectal abscess, partial fistulotomy,  seton placement;  Surgeon: Camila Calderon MD;  Location: [facility]     FISTULOTOMY RECTUM N/A 1/10/2020    Procedure: partial FISTULOTOMY, RECTUM;  Surgeon: Camila Calderon MD;  [facility]     PLACEMENT OF SETON RECTUM N/A 1/10/2020    Procedure: PLACEMENT, SETON STITCH;  Surgeon: Camila Calderon MD;  Location: [facility]      No Known Allergies     Anesthesia Evaluation     . Pt has had prior anesthetic.     No history of anesthetic complications          ROS/MED HX    ENT/Pulmonary:  - neg pulmonary ROS    (-) tobacco use   Neurologic:  - neg neurologic ROS     Cardiovascular:     (+) Dyslipidemia, hypertension--CAD, --stent,3 stents to RCA 8/2014  . : . . . :. . Previous cardiac testing date:results:date: results:ECG reviewed date:6/11/20 results:\"Sinus rhythm, within normal limits\"Cath date: 3/2015 results:          METS/Exercise Tolerance:  >4 METS   Hematologic:  - neg hematologic  " "ROS       Musculoskeletal:  - neg musculoskeletal ROS       GI/Hepatic:     (+) Other GI/Hepatic Perianal abscess      Renal/Genitourinary:  - ROS Renal section negative       Endo:  - neg endo ROS       Psychiatric:  - neg psychiatric ROS       Infectious Disease:  - neg infectious disease ROS       Malignancy:      - no malignancy   Other:    - neg other ROS             Latest Cardiology note, Dr. West, 4/6/20:  \"I first evaluated him in March 2015 and reviewed his coronary angiogram from [facility].  There was a 60-70% stenosis in the LAD and I was concerned enough to bring him back to the cath lab and check an FFR.  The FFR was 0.78 and the stents in the RCA were widely patent.  He continued on medical therapy.  It has been a year since his last visit.  He has not had subsequent chest discomfort, SOB (including the tingling sensation he originally described), PND, orthopnea, pedal edema, palpitations, lightheadedness, and syncope.  He is active, working at [employer] 6 times a week.  He is not experiencing any cardiopulmonary symptoms during exercise.\"    \"Coronary Angiogram (3/26/15):  Coronary angiography was performed via the Rt RADIAL artery using a 5 Fr Shiraz catheter and a 6 Fr Ikari 3.5 GC.  The LAD and LCx had separate ostia.  Both coronary vessels had moderate calcification in the proximal segment.  The LAD had 30-40% narrowing proximally and a long 60% stenosis in the mid segment.  There was 25% narrowing at the takeoff of D3.  There were three diagonal branches, all moderate in caliber.  D1 had minimal disease.  D2 had 50% ostial narrowing.  D3 had minimal disease.  The LCx gave rise to 2 OM branches.  There was mild atherosclerosis (25%) disease in the LCx that was diffuse in nature.  The OM branches had mild disease. The RCA had a stent in the proximal segment and two overlapping stents spanning the mid-distal segment.  There was mild ISR (10%) in the stented segment.  There was " "30-40% disease between the two stents.  There was minimal disease in the Rt PDA and Rt PL segments.     Physiologic assessment was performed on the LAD with the Radiwire positioned at the apex of the LAD.  The FFR of LAD was 0.78 at peak hyperemia.     Impression:    1. Single vessel CAD, angiographically borderline 60-70% long stenosis in mid LAD  2. Physiologically borderline stenosis in mid LAD\"    JZG FV AN PHYSICAL EXAM    Assessment:   ASA SCORE: 2    H&P: History and physical reviewed and following examination; no interval change.   Smoking Status:  Non-Smoker/Unknown   NPO Status: NPO Appropriate     Plan:   Anes. Type:  MAC   Pre-Medication: None   Induction:  IV (Standard)   Airway: Native Airway   Access/Monitoring: PIV   Maintenance: N/a     Postop Plan:   Postop Pain: None  Postop Sedation/Airway: Not planned  Disposition: Outpatient     PONV Management:   Adult Risk Factors:, Non-Smoker   Prevention: Ondansetron     CONSENT: Direct conversation   Plan and risks discussed with: Patient          Comments for Plan/Consent:  Discussed plan for MAC, including backup plan of general anesthesia and risks of general anesthesia, including sore throat/hoarse voice, abrasions/damage to lips/tongue/teeth, nausea, rare complications (including medication reactions, cardiac, pulmonary). Discussed possible increased risk of cardiac complications due to CAD.                   Brissa Morgan MD  "

## 2020-06-19 ENCOUNTER — ANESTHESIA (OUTPATIENT)
Dept: SURGERY | Facility: AMBULATORY SURGERY CENTER | Age: 60
End: 2020-06-19

## 2020-06-19 ENCOUNTER — HOSPITAL ENCOUNTER (OUTPATIENT)
Facility: AMBULATORY SURGERY CENTER | Age: 60
End: 2020-06-19
Attending: COLON & RECTAL SURGERY
Payer: COMMERCIAL

## 2020-06-19 VITALS
SYSTOLIC BLOOD PRESSURE: 140 MMHG | HEART RATE: 47 BPM | TEMPERATURE: 97.3 F | RESPIRATION RATE: 14 BRPM | HEIGHT: 72 IN | BODY MASS INDEX: 27.77 KG/M2 | OXYGEN SATURATION: 97 % | WEIGHT: 205 LBS | DIASTOLIC BLOOD PRESSURE: 82 MMHG

## 2020-06-19 DIAGNOSIS — K61.0 PERIANAL ABSCESS: Primary | ICD-10-CM

## 2020-06-19 DIAGNOSIS — K60.30 ANAL FISTULA: ICD-10-CM

## 2020-06-19 RX ORDER — SODIUM CHLORIDE, SODIUM LACTATE, POTASSIUM CHLORIDE, CALCIUM CHLORIDE 600; 310; 30; 20 MG/100ML; MG/100ML; MG/100ML; MG/100ML
INJECTION, SOLUTION INTRAVENOUS CONTINUOUS
Status: DISCONTINUED | OUTPATIENT
Start: 2020-06-19 | End: 2020-06-20 | Stop reason: HOSPADM

## 2020-06-19 RX ORDER — PROPOFOL 10 MG/ML
INJECTION, EMULSION INTRAVENOUS CONTINUOUS PRN
Status: DISCONTINUED | OUTPATIENT
Start: 2020-06-19 | End: 2020-06-19

## 2020-06-19 RX ORDER — ONDANSETRON 4 MG/1
4 TABLET, ORALLY DISINTEGRATING ORAL EVERY 30 MIN PRN
Status: DISCONTINUED | OUTPATIENT
Start: 2020-06-19 | End: 2020-06-20 | Stop reason: HOSPADM

## 2020-06-19 RX ORDER — ONDANSETRON 2 MG/ML
4 INJECTION INTRAMUSCULAR; INTRAVENOUS EVERY 30 MIN PRN
Status: DISCONTINUED | OUTPATIENT
Start: 2020-06-19 | End: 2020-06-20 | Stop reason: HOSPADM

## 2020-06-19 RX ORDER — OXYCODONE HYDROCHLORIDE 5 MG/1
5 TABLET ORAL EVERY 4 HOURS PRN
Status: DISCONTINUED | OUTPATIENT
Start: 2020-06-19 | End: 2020-06-20 | Stop reason: HOSPADM

## 2020-06-19 RX ORDER — KETOROLAC TROMETHAMINE 30 MG/ML
INJECTION, SOLUTION INTRAMUSCULAR; INTRAVENOUS PRN
Status: DISCONTINUED | OUTPATIENT
Start: 2020-06-19 | End: 2020-06-19

## 2020-06-19 RX ORDER — ONDANSETRON 2 MG/ML
INJECTION INTRAMUSCULAR; INTRAVENOUS PRN
Status: DISCONTINUED | OUTPATIENT
Start: 2020-06-19 | End: 2020-06-19

## 2020-06-19 RX ORDER — MEPERIDINE HYDROCHLORIDE 25 MG/ML
12.5 INJECTION INTRAMUSCULAR; INTRAVENOUS; SUBCUTANEOUS
Status: DISCONTINUED | OUTPATIENT
Start: 2020-06-19 | End: 2020-06-20 | Stop reason: HOSPADM

## 2020-06-19 RX ORDER — LIDOCAINE HYDROCHLORIDE 20 MG/ML
INJECTION, SOLUTION INFILTRATION; PERINEURAL PRN
Status: DISCONTINUED | OUTPATIENT
Start: 2020-06-19 | End: 2020-06-19

## 2020-06-19 RX ORDER — LIDOCAINE 40 MG/G
CREAM TOPICAL
Status: DISCONTINUED | OUTPATIENT
Start: 2020-06-19 | End: 2020-06-20 | Stop reason: HOSPADM

## 2020-06-19 RX ORDER — PROPOFOL 10 MG/ML
INJECTION, EMULSION INTRAVENOUS PRN
Status: DISCONTINUED | OUTPATIENT
Start: 2020-06-19 | End: 2020-06-19

## 2020-06-19 RX ORDER — GLYCOPYRROLATE 0.2 MG/ML
INJECTION, SOLUTION INTRAMUSCULAR; INTRAVENOUS PRN
Status: DISCONTINUED | OUTPATIENT
Start: 2020-06-19 | End: 2020-06-19

## 2020-06-19 RX ORDER — GABAPENTIN 300 MG/1
300 CAPSULE ORAL ONCE
Status: COMPLETED | OUTPATIENT
Start: 2020-06-19 | End: 2020-06-19

## 2020-06-19 RX ORDER — KETAMINE HYDROCHLORIDE 10 MG/ML
INJECTION, SOLUTION INTRAMUSCULAR; INTRAVENOUS PRN
Status: DISCONTINUED | OUTPATIENT
Start: 2020-06-19 | End: 2020-06-19

## 2020-06-19 RX ORDER — ACETAMINOPHEN 325 MG/1
975 TABLET ORAL ONCE
Status: COMPLETED | OUTPATIENT
Start: 2020-06-19 | End: 2020-06-19

## 2020-06-19 RX ORDER — NALOXONE HYDROCHLORIDE 0.4 MG/ML
.1-.4 INJECTION, SOLUTION INTRAMUSCULAR; INTRAVENOUS; SUBCUTANEOUS
Status: DISCONTINUED | OUTPATIENT
Start: 2020-06-19 | End: 2020-06-20 | Stop reason: HOSPADM

## 2020-06-19 RX ORDER — FENTANYL CITRATE 50 UG/ML
25-50 INJECTION, SOLUTION INTRAMUSCULAR; INTRAVENOUS EVERY 5 MIN PRN
Status: DISCONTINUED | OUTPATIENT
Start: 2020-06-19 | End: 2020-06-20 | Stop reason: HOSPADM

## 2020-06-19 RX ORDER — LIDOCAINE HYDROCHLORIDE AND EPINEPHRINE 10; 10 MG/ML; UG/ML
INJECTION, SOLUTION INFILTRATION; PERINEURAL PRN
Status: DISCONTINUED | OUTPATIENT
Start: 2020-06-19 | End: 2020-06-19 | Stop reason: HOSPADM

## 2020-06-19 RX ADMIN — KETAMINE HYDROCHLORIDE 20 MG: 10 INJECTION, SOLUTION INTRAMUSCULAR; INTRAVENOUS at 07:18

## 2020-06-19 RX ADMIN — KETOROLAC TROMETHAMINE 30 MG: 30 INJECTION, SOLUTION INTRAMUSCULAR; INTRAVENOUS at 07:13

## 2020-06-19 RX ADMIN — PROPOFOL 100 MG: 10 INJECTION, EMULSION INTRAVENOUS at 07:17

## 2020-06-19 RX ADMIN — SODIUM CHLORIDE, SODIUM LACTATE, POTASSIUM CHLORIDE, CALCIUM CHLORIDE: 600; 310; 30; 20 INJECTION, SOLUTION INTRAVENOUS at 06:33

## 2020-06-19 RX ADMIN — GLYCOPYRROLATE 0.2 MG: 0.2 INJECTION, SOLUTION INTRAMUSCULAR; INTRAVENOUS at 07:13

## 2020-06-19 RX ADMIN — ONDANSETRON 4 MG: 2 INJECTION INTRAMUSCULAR; INTRAVENOUS at 07:13

## 2020-06-19 RX ADMIN — GABAPENTIN 300 MG: 300 CAPSULE ORAL at 06:28

## 2020-06-19 RX ADMIN — LIDOCAINE HYDROCHLORIDE 60 MG: 20 INJECTION, SOLUTION INFILTRATION; PERINEURAL at 07:15

## 2020-06-19 RX ADMIN — ACETAMINOPHEN 975 MG: 325 TABLET ORAL at 06:28

## 2020-06-19 RX ADMIN — PROPOFOL 200 MCG/KG/MIN: 10 INJECTION, EMULSION INTRAVENOUS at 07:15

## 2020-06-19 ASSESSMENT — MIFFLIN-ST. JEOR: SCORE: 1787.87

## 2020-06-19 NOTE — ANESTHESIA POSTPROCEDURE EVALUATION
Anesthesia POST Procedure Evaluation    Patient: Tremayne Galarza   MRN:     [id number] Gender:   male   Age:    60 year old :      1960        Preoperative Diagnosis: Perianal abscess [K61.0]   Procedure(s):  Exam Under Anesthesia, Fistulotomy, Seton placement   Postop Comments: No value filed.     Anesthesia Type: MAC       Disposition: Outpatient   Postop Pain Control: Uneventful            Sign Out: Well controlled pain   PONV: No   Neuro/Psych: Uneventful            Sign Out: Acceptable/Baseline neuro status   Airway/Respiratory: Uneventful            Sign Out: Acceptable/Baseline resp. status   CV/Hemodynamics: Uneventful            Sign Out: Acceptable CV status   Other NRE: NONE   DID A NON-ROUTINE EVENT OCCUR? No         Last Anesthesia Record Vitals:  CRNA VITALS  2020 0715 - 2020 0815      2020             EKG:  Sinus rhythm          Last PACU Vitals:  Vitals Value Taken Time   BP     Temp     Pulse     Resp     SpO2     Temp src Available 2020  7:45 AM   NIBP 114/72 2020  7:40 AM   Pulse 61 2020  7:44 AM   SpO2 97 % 2020  7:44 AM   Resp     Temp     Ht Rate 59 2020  7:42 AM   Temp 2           Electronically Signed By: Brissa Morgan MD, 2020, 1:19 PM

## 2020-06-19 NOTE — ANESTHESIA CARE TRANSFER NOTE
Patient: Tremayne Galarza    Procedure(s):  Exam Under Anesthesia, Fistulotomy, Seton placement    Diagnosis: Perianal abscess [K61.0]  Diagnosis Additional Information: No value filed.    Anesthesia Type:   MAC     Note:  Airway :Room Air  Patient transferred to:Phase II  Comments: Uneventful transport to Phase II: VSS; IV patent; pt comfortable; Report given to RN; pt. Responds appropriately to commandsHandoff Report: Identifed the Patient, Identified the Reponsible Provider, Reviewed the pertinent medical history, Discussed the surgical course, Reviewed Intra-OP anesthesia mangement and issues during anesthesia, Set expectations for post-procedure period and Allowed opportunity for questions and acknowledgement of understanding      Vitals: (Last set prior to Anesthesia Care Transfer)    CRNA VITALS  6/19/2020 0715 - 6/19/2020 0746      6/19/2020             EKG:  Sinus rhythm                Electronically Signed By: SYLVIA Aguilera CRNA  June 19, 2020  7:46 AM

## 2020-06-21 NOTE — OP NOTE
SURGEON: Camila Calderon MD     ASSISTANT: None    PREOPERATIVE DIAGNOSIS: Anorectal fistula with large internal orifice, extension to the deep post-anal space and horseshoeing to the right.     POSTOPERATIVE DIAGNOSIS: Anorectal fistula with large internal orifice, extension to the deep post-anal space and horseshoeing to the right     PROCEDURE: Examination under anesthesia, partial fistulotomy, debridement of the fistula and placement of a seton.     INDICATIONS: Tremayne Galarza is a 60 year old male who has a large posterior fistula that is intersphincteric with a large internal orifice. This has gotten worse despite a seton. An Magnetic Resonance Imaging (MRI) showed complexity and a horseshoe component to the right. The risks and benefits of surgery were thoroughly discussed with the patient and he agreed to proceed.     DESCRIPTION OF PROCEDURE: The patient was brought to the operating room, placed prone on the operating room table. Deep sedation was induced with intravenous medicines with deep sedation and monitored anesthesia care. We prepped and draped the area in the usual sterile fashion. We began the procedure with a timeout and performed an anal block using a mixture 50/50 of bupivacaine and lidocaine with epinephrine. A total of 40 mL were infused and following this, we performed anoscopy which demonstrated a large internal orifice posteriorly with an associated fissure. There was also a right external orifice which was new and a posterior seton. The right fistula with the horseshoe did not involve muscle, so a fistulotomy was performed and it was debrided with good hemostasis. The posterior component demonstrated a ppocket into the deep post anal space which was debrided. The seton was replaced with 2 in order to keep the orifice fully open and hopefully to drain. This was secured with 4 separate silk suture.  At the end the case, all instruments and sponge counts were correct x2. The  patient was emerged from anesthesia and taken to postoperative anesthesia care unit in good condition.     SPECIMEN: None.     ESTIMATED BLOOD LOSS: Minimal.     DRAINS: None.     URINE OUTPUT: Not measured.     JOSIAS MCGRATH MD   Colon and Rectal Surgery Staff  [facility]

## 2020-06-25 ENCOUNTER — OFFICE VISIT (OUTPATIENT)
Dept: SURGERY | Facility: CLINIC | Age: 60
End: 2020-06-25
Payer: COMMERCIAL

## 2020-06-25 VITALS
HEIGHT: 72 IN | TEMPERATURE: 98.4 F | OXYGEN SATURATION: 95 % | SYSTOLIC BLOOD PRESSURE: 123 MMHG | HEART RATE: 52 BPM | BODY MASS INDEX: 28.06 KG/M2 | DIASTOLIC BLOOD PRESSURE: 73 MMHG | WEIGHT: 207.2 LBS

## 2020-06-25 DIAGNOSIS — K60.30 ANAL FISTULA: ICD-10-CM

## 2020-06-25 DIAGNOSIS — Z09 FOLLOW-UP EXAMINATION FOLLOWING SURGERY: Primary | ICD-10-CM

## 2020-06-25 DIAGNOSIS — E78.00 HYPERCHOLESTEREMIA: ICD-10-CM

## 2020-06-25 LAB
CHOLEST SERPL-MCNC: 224 MG/DL
HDLC SERPL-MCNC: 54 MG/DL
HIV 1+2 AB+HIV1 P24 AG SERPL QL IA: NONREACTIVE
LDLC SERPL CALC-MCNC: 144 MG/DL
NONHDLC SERPL-MCNC: 170 MG/DL
TRIGL SERPL-MCNC: 131 MG/DL

## 2020-06-25 ASSESSMENT — PAIN SCALES - GENERAL: PAINLEVEL: NO PAIN (0)

## 2020-06-25 ASSESSMENT — MIFFLIN-ST. JEOR: SCORE: 1797.85

## 2020-06-25 NOTE — NURSING NOTE
Chief Complaint   Patient presents with     Surgical Followup     Date of surgery 6/19/2020.       Vitals:    06/25/20 1003   BP: 123/73   BP Location: Left arm   Patient Position: Sitting   Cuff Size: Adult Large   Pulse: 52   Temp: 98.4  F (36.9  C)   TempSrc: Oral   SpO2: 95%   Weight: 207 lb 3.2 oz   Height: 6'       Body mass index is 28.1 kg/m .      Eliezer Childers, EMT

## 2020-06-25 NOTE — PROGRESS NOTES
Colon and Rectal Surgery Follow-Up Clinic Note    RE: Tremayne Galarza  : 1960  STEFANIA: 2020    Tremayne Galarza is a very pleasant 60 year old male here for follow-up of anorectal fistula.    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     He developed two small boils on his buttock and hip with cultures showing staphylococcus aureus and he was treated with Augmentin.    MRI Pelvis 20:  1. 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  2. Near complete resolution of the previously seen posterior perianal  abscess, with minimal residual dilation of the superior fistula tract.  3. Skin and subcutaneous soft tissue thickening and enhancement in the  anterior left perineal region corresponding to the clinically visible  lesion.    Colonoscopy in  was normal.    He saw dermatology and was treated for folliculitis     Tremayne has had a large posterior fistula that is intersphincteric with a large internal orifice. This has gotten worse despite a seton. An Magnetic Resonance Imaging (MRI) 20 showed complexity and a horseshoe component to the right.    He is now status post examination under anesthesia with partial fistulotomy, debridement of the fistula and placement of a seton on 20 with Dr. Calderon with a large internal orifice, extension to the deep post-anal space and horseshoeing to the right    Interval history: Tremayne has been doing well but continues to have quite a bit of drainage.  He also has some bleeding.  He denies any difficulty holding bowel movements.  No fevers or chills.  It is difficult for him to manage to setons.    Physical Examination: Exam was chaperoned by RAMAN Tolentino   /73 (BP Location: Left arm, Patient Position: Sitting, Cuff Size: Adult Large)   Pulse 52   Temp 98.4  F (36.9  C) (Oral)   Ht  6'   Wt 207 lb 3.2 oz   SpO2 95%   BMI 28.10 kg/m    General: Alert, oriented, in no acute distress, sitting comfortably  HEENT: Mucous membranes moist  Perianal external examination:  2 yellow vessel loop setons in place in the posterior position with fistulotomy site with good granulation tissue.  No erythema.  Some purulent drainage present.    Digital rectal examination: Was deferred.    Anoscopy: Was deferred.    Assessment/Plan: 60 year old male with complex anal fistula and deep post anal space status post examination under anesthesia with partial fistulotomy, debridement of the fistula and placement of a seton on 6/20/20 with Dr. Calderon.  He is doing well.  I think we should pursue further investigation for the source of this complex fistula and would like to rule out TB, Crohn's, and HIV, although I think these are fairly unlikely.  We will get lab work today and did recommend a repeat colonoscopy.  In discussion with Dr. Calderon she thinks the postanal space was never fully drained and this may have led to the nonhealing nature of his fistula.  We are hopeful that this will progress now that this has been cleaned out and with the 2 setons in place to allow for better drainage.  We will have him follow-up in 4 weeks with Dr. Calderon to discuss further management but encouraged him to contact the clinic in meantime with any questions or concerns. Patient's questions were answered to his stated satisfaction and he is in agreement with this plan.    Medical history:  Past Medical History:   Diagnosis Date     Chronic ischemic heart disease 9/4/2014    Overview:  3 stents to RCA 8/2014      Essential hypertension 2/11/2005    Overview:  LW Onset:  [id number]      Fistula      Hyperlipidemia 11/14/2005    Overview:  LW Onset:  [id number]      Perirectal abscess      Staph infection        Surgical history:  Past Surgical History:   Procedure Laterality Date     BIOPSY       CARDIAC SURGERY        COLONOSCOPY N/A 12/30/2015    Procedure: COLONOSCOPY;  Surgeon: Duane, William Charles, MD;  Location: [facility] OR     COLONOSCOPY WITH CO2 INSUFFLATION N/A 12/30/2015    Procedure: COLONOSCOPY WITH CO2 INSUFFLATION;  Surgeon: Duane, William Charles, MD;  Location: [facility] OR     EXAM UNDER ANESTHESIA ANUS N/A 1/10/2020    Procedure: EXAM UNDER ANESTHESIA, ANUS, inscision and drainage of perirectal abscess, partial fistulotomy,  seton placement;  Surgeon: Camila Calderon MD;  [facility]     EXAM UNDER ANESTHESIA ANUS N/A 6/19/2020    Procedure: Exam Under Anesthesia, Fistulotomy, Seton placement;  Surgeon: Camila Calderon MD;  [facility]     FISTULOTOMY RECTUM N/A 1/10/2020    Procedure: partial FISTULOTOMY, RECTUM;  Surgeon: Camila Calderon MD;  [facility]     PLACEMENT OF SETON RECTUM N/A 1/10/2020    Procedure: PLACEMENT, SETON STITCH;  Surgeon: Camila Calderon MD;  Location: [facility]       Problem list:  Patient Active Problem List    Diagnosis Date Noted     Staph infection 03/03/2020     Priority: Medium     History of nonmelanoma skin cancer 03/03/2020     Priority: Medium     History of dysplastic nevus 03/03/2020     Priority: Medium     Perianal abscess 01/09/2020     Priority: Medium     Added automatically from request for surgery [id number]       Anal fistula 01/09/2020     Priority: Medium     Added automatically from request for surgery [id number]       Verruca plantaris 04/11/2018     Priority: Medium     LUQ abdominal pain 03/27/2018     Priority: Medium     CAD (coronary artery disease) 08/05/2015     Priority: Medium     Status post coronary angiogram 03/26/2015     Priority: Medium     Nondependent alcohol abuse 02/03/2015     Priority: Medium     Chronic ischemic heart disease 09/04/2014     Priority: Medium     Overview:   3 stents to RCA 8/2014       Postsurgical percutaneous transluminal coronary angioplasty status 09/04/2014     Priority: Medium      Benign neoplasm of colon 06/05/2013     Priority: Medium     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 Provider to review and confirm.       HTN, goal below 140/90 02/11/2005     Priority: Medium     Overview:   LW Onset:  [id number]       Obesity 02/11/2005     Priority: Medium     Overview:   LW Onset:  11Feb05       Allergic rhinitis 02/11/2005     Priority: Medium     Overview:   LW Onset:  11Feb05         Medications:  Current Outpatient Medications   Medication Sig Dispense Refill     aspirin (CHILDRENS ASPIRIN) 81 MG chewable tablet Take 81 mg by mouth       atenolol (TENORMIN) 25 MG tablet Take 1 tablet (25 mg) by mouth daily 90 tablet 3     ezetimibe (ZETIA) 10 MG tablet TAKE ONE TABLET BY MOUTH ONCE DAILY 90 tablet 0     fexofenadine (ALLEGRA) 60 MG tablet Take 0.5 tablets (30 mg) by mouth daily 60 tablet 3     mupirocin (BACTROBAN) 2 % external ointment Use 3 times per day for 3 weeks. Then the first week of every month for 6 months. 60 g 2     rosuvastatin (CRESTOR) 40 MG tablet Take 1 tablet (40 mg) by mouth daily 90 tablet 3     sildenafil (VIAGRA) 50 MG tablet Take 1 tablet (50 mg) by mouth daily as needed (erectile dysfunction) as needed for ED 10 tablet 0       Allergies:  No Known Allergies    Family history:  Family History   Problem Relation Age of Onset     Coronary Artery Disease Mother      Hypertension Mother      Cerebrovascular Disease Mother        Social history:  Social History     Tobacco Use     Smoking status: Never Smoker     Smokeless tobacco: Never Used   Substance Use Topics     Alcohol use: Yes     Marital status: .    Nursing Notes:   Eliezer Childers, EMT  6/25/2020 10:20 AM  Signed  Chief Complaint   Patient presents with     Surgical Followup     Date of surgery 6/19/2020.       Vitals:    06/25/20 1003    BP: 123/73   BP Location: Left arm   Patient Position: Sitting   Cuff Size: Adult Large   Pulse: 52   Temp: 98.4  F (36.9  C)   TempSrc: Oral   SpO2: 95%   Weight: 207 lb 3.2 oz   Height: 6'       Body mass index is 28.1 kg/m .      RAMAN Tolentino                         Total face to face time was 15 minutes, >50% counseling.   This is a postop visit.    Juliette Giles NP-C  Colon and Rectal Surgery  [facility]

## 2020-06-25 NOTE — LETTER
2020       RE: Tremayne Galarza  [address] [facility] [address]     Dear Colleague,    Thank you for referring your patient, Tremayne Galarza, to the [facility] COLON AND RECTAL SURGERY at [facility]. Please see a copy of my visit note below.    Colon and Rectal Surgery Follow-Up Clinic Note    RE: Tremayne Galarza  : 1960  STEFANIA: 2020    Tremayne Galarza is a very pleasant 60 year old male here for follow-up of anorectal fistula.    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     He developed two small boils on his buttock and hip with cultures showing staphylococcus aureus and he was treated with Augmentin.    MRI Pelvis 20:  1. 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  2. Near complete resolution of the previously seen posterior perianal  abscess, with minimal residual dilation of the superior fistula tract.  3. Skin and subcutaneous soft tissue thickening and enhancement in the  anterior left perineal region corresponding to the clinically visible  lesion.    Colonoscopy in  was normal.    He saw dermatology and was treated for folliculitis     Tremayne has had a large posterior fistula that is intersphincteric with a large internal orifice. This has gotten worse despite a seton. An Magnetic Resonance Imaging (MRI) 20 showed complexity and a horseshoe component to the right.    He is now status post examination under anesthesia with partial fistulotomy, debridement of the fistula and placement of a seton on 20 with Dr. Calderon with a large internal orifice, extension to the deep post-anal space and horseshoeing to the right    Interval history: Tremayne has been doing well but continues to have quite a bit of drainage.  He also has some bleeding.  He denies any difficulty holding  bowel movements.  No fevers or chills.  It is difficult for him to manage to setons.    Physical Examination: Exam was chaperoned by Eliezer Childers, EMT   /73 (BP Location: Left arm, Patient Position: Sitting, Cuff Size: Adult Large)   Pulse 52   Temp 98.4  F (36.9  C) (Oral)   Ht 6'   Wt 207 lb 3.2 oz   SpO2 95%   BMI 28.10 kg/m    General: Alert, oriented, in no acute distress, sitting comfortably  HEENT: Mucous membranes moist  Perianal external examination:  2 yellow vessel loop setons in place in the posterior position with fistulotomy site with good granulation tissue.  No erythema.  Some purulent drainage present.    Digital rectal examination: Was deferred.    Anoscopy: Was deferred.    Assessment/Plan: 60 year old male with complex anal fistula and deep post anal space status post examination under anesthesia with partial fistulotomy, debridement of the fistula and placement of a seton on 6/20/20 with Dr. Calderon.  He is doing well.  I think we should pursue further investigation for the source of this complex fistula and would like to rule out TB, Crohn's, and HIV, although I think these are fairly unlikely.  We will get lab work today and did recommend a repeat colonoscopy.  In discussion with Dr. Calderon she thinks the postanal space was never fully drained and this may have led to the nonhealing nature of his fistula.  We are hopeful that this will progress now that this has been cleaned out and with the 2 setons in place to allow for better drainage.  We will have him follow-up in 4 weeks with Dr. Calderon to discuss further management but encouraged him to contact the clinic in meantime with any questions or concerns. Patient's questions were answered to his stated satisfaction and he is in agreement with this plan.    Medical history:  Past Medical History:   Diagnosis Date     Chronic ischemic heart disease 9/4/2014    Overview:  3 stents to RCA 8/2014      Essential  hypertension 2/11/2005    Overview:  LW Onset:  [id number]      Fistula      Hyperlipidemia 11/14/2005    Overview:  LW Onset:  [id number]      Perirectal abscess      Staph infection        Surgical history:  Past Surgical History:   Procedure Laterality Date     BIOPSY       CARDIAC SURGERY       COLONOSCOPY N/A 12/30/2015    Procedure: COLONOSCOPY;  Surgeon: Duane, William Charles, MD;  Location: [facility] OR     COLONOSCOPY WITH CO2 INSUFFLATION N/A 12/30/2015    Procedure: COLONOSCOPY WITH CO2 INSUFFLATION;  Surgeon: Duane, William Charles, MD;  Location: [facility] OR     EXAM UNDER ANESTHESIA ANUS N/A 1/10/2020    Procedure: EXAM UNDER ANESTHESIA, ANUS, inscision and drainage of perirectal abscess, partial fistulotomy,  seton placement;  Surgeon: Camila Calderon MD;  [facility]     EXAM UNDER ANESTHESIA ANUS N/A 6/19/2020    Procedure: Exam Under Anesthesia, Fistulotomy, Seton placement;  Surgeon: Camila Caledron MD;  [facility]     FISTULOTOMY RECTUM N/A 1/10/2020    Procedure: partial FISTULOTOMY, RECTUM;  Surgeon: Camila Calderon MD;  [facility]     PLACEMENT OF SETON RECTUM N/A 1/10/2020    Procedure: PLACEMENT, SETON STITCH;  Surgeon: Camila Calderon MD;  [facility]       Problem list:  Patient Active Problem List    Diagnosis Date Noted     Staph infection 03/03/2020     Priority: Medium     History of nonmelanoma skin cancer 03/03/2020     Priority: Medium     History of dysplastic nevus 03/03/2020     Priority: Medium     Perianal abscess 01/09/2020     Priority: Medium     Added automatically from request for surgery [id number]       Anal fistula 01/09/2020     Priority: Medium     Added automatically from request for surgery [id number]       Verruca plantaris 04/11/2018     Priority: Medium     LUQ abdominal pain 03/27/2018     Priority: Medium     CAD (coronary artery disease) 08/05/2015     Priority: Medium     Status post coronary angiogram 03/26/2015      Priority: Medium     Nondependent alcohol abuse 02/03/2015     Priority: Medium     Chronic ischemic heart disease 09/04/2014     Priority: Medium     Overview:   3 stents to RCA 8/2014       Postsurgical percutaneous transluminal coronary angioplasty status 09/04/2014     Priority: Medium     Benign neoplasm of colon 06/05/2013     Priority: Medium     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 Provider to review and confirm.       HTN, goal below 140/90 02/11/2005     Priority: Medium     Overview:   LW Onset:  [id number]       Obesity 02/11/2005     Priority: Medium     Overview:   LW Onset:  11Feb05       Allergic rhinitis 02/11/2005     Priority: Medium     Overview:   LW Onset:  11Feb05         Medications:  Current Outpatient Medications   Medication Sig Dispense Refill     aspirin (CHILDRENS ASPIRIN) 81 MG chewable tablet Take 81 mg by mouth       atenolol (TENORMIN) 25 MG tablet Take 1 tablet (25 mg) by mouth daily 90 tablet 3     ezetimibe (ZETIA) 10 MG tablet TAKE ONE TABLET BY MOUTH ONCE DAILY 90 tablet 0     fexofenadine (ALLEGRA) 60 MG tablet Take 0.5 tablets (30 mg) by mouth daily 60 tablet 3     mupirocin (BACTROBAN) 2 % external ointment Use 3 times per day for 3 weeks. Then the first week of every month for 6 months. 60 g 2     rosuvastatin (CRESTOR) 40 MG tablet Take 1 tablet (40 mg) by mouth daily 90 tablet 3     sildenafil (VIAGRA) 50 MG tablet Take 1 tablet (50 mg) by mouth daily as needed (erectile dysfunction) as needed for ED 10 tablet 0       Allergies:  No Known Allergies    Family history:  Family History   Problem Relation Age of Onset     Coronary Artery Disease Mother      Hypertension Mother      Cerebrovascular Disease Mother        Social history:  Social History     Tobacco Use     Smoking status: Never Smoker      Smokeless tobacco: Never Used   Substance Use Topics     Alcohol use: Yes     Marital status: .    Nursing Notes:   Eliezer Childers EMT  6/25/2020 10:20 AM  Signed  Chief Complaint   Patient presents with     Surgical Followup     Date of surgery 6/19/2020.       Vitals:    06/25/20 1003   BP: 123/73   BP Location: Left arm   Patient Position: Sitting   Cuff Size: Adult Large   Pulse: 52   Temp: 98.4  F (36.9  C)   TempSrc: Oral   SpO2: 95%   Weight: 207 lb 3.2 oz   Height: 6'       Body mass index is 28.1 kg/m .      RAMAN Tolentino                         Total face to face time was 15 minutes, >50% counseling.   This is a postop visit.    Juliette Giles NP-C  Colon and Rectal Surgery  [facility]

## 2020-06-26 LAB — HIV1 AB SERPL QL IB: ABNORMAL

## 2020-06-29 LAB
GAMMA INTERFERON BACKGROUND BLD IA-ACNC: 0.04 IU/ML
M TB IFN-G BLD-IMP: NEGATIVE
M TB IFN-G CD4+ BCKGRND COR BLD-ACNC: >10 IU/ML
MITOGEN IGNF BCKGRD COR BLD-ACNC: 0 IU/ML
MITOGEN IGNF BCKGRD COR BLD-ACNC: 0 IU/ML

## 2020-07-07 ENCOUNTER — TELEPHONE (OUTPATIENT)
Dept: CARDIOLOGY | Facility: CLINIC | Age: 60
End: 2020-07-07

## 2020-07-07 DIAGNOSIS — I25.10 CAD (CORONARY ARTERY DISEASE): Primary | ICD-10-CM

## 2020-07-07 DIAGNOSIS — E78.5 HYPERLIPIDEMIA WITH TARGET LDL LESS THAN 100: ICD-10-CM

## 2020-07-07 NOTE — TELEPHONE ENCOUNTER
----- Message from Tomás West MD sent at 6/29/2020  1:52 AM CDT -----  Morena,      ADDENDUM (6/25/2020): Lipids far from goal.  If patient compliant with medications (crestor and Zetia), ADD Repatha 140 mg subcutaneous q 2 weeks.    Tomás West MD    Divisions of Cardiology  [place], [place]      Called patient. He was unable to talk and said he would call back.   DBaBRIANNA malagon

## 2020-07-31 NOTE — TELEPHONE ENCOUNTER
Called and spoke to patient. He states he was having a procedure done at the [facility] and they did the lipid test. He had not fasted and he had stopped his medications for two weeks.   Patient has restarted the rosuvastatin and zetia. He was advised to take daily and recheck his labs in two months. Will them have Dr West review and make recommendations.     BRIANNA Mcnally

## 2020-08-05 ENCOUNTER — OFFICE VISIT (OUTPATIENT)
Dept: SURGERY | Facility: CLINIC | Age: 60
End: 2020-08-05
Payer: COMMERCIAL

## 2020-08-05 VITALS
OXYGEN SATURATION: 97 % | WEIGHT: 207.2 LBS | SYSTOLIC BLOOD PRESSURE: 140 MMHG | DIASTOLIC BLOOD PRESSURE: 89 MMHG | HEART RATE: 51 BPM | BODY MASS INDEX: 28.06 KG/M2 | HEIGHT: 72 IN

## 2020-08-05 DIAGNOSIS — Z09 FOLLOW-UP EXAMINATION FOLLOWING SURGERY: Primary | ICD-10-CM

## 2020-08-05 DIAGNOSIS — K60.30 ANAL FISTULA: ICD-10-CM

## 2020-08-05 ASSESSMENT — PAIN SCALES - GENERAL: PAINLEVEL: NO PAIN (0)

## 2020-08-05 ASSESSMENT — MIFFLIN-ST. JEOR: SCORE: 1797.85

## 2020-08-05 NOTE — NURSING NOTE
Chief Complaint   Patient presents with     RECHECK     Post-op DOS 6/19/2020       Vitals:    08/05/20 1515   BP: (!) 140/89   BP Location: Left arm   Patient Position: Sitting   Cuff Size: Adult Regular   Pulse: 51   SpO2: 97%   Weight: 207 lb 3.2 oz   Height: 6'       Body mass index is 28.1 kg/m .      Cornelius Chavez LPN

## 2020-08-05 NOTE — PATIENT INSTRUCTIONS
Follow up:  1. MRI before 9/16/2020    Please call with any questions or concerns regarding your clinic visit today.    It is a pleasure being involved in your health care.    Contacts post-consultation depending on your need:    Radiology Appointments [phone]    Schedule Clinic Appointments [phone] # 1   M-F 7:30 - 5 pm    BRIANNA Mora [phone]    Clinic Fax Number [phone]    Surgery Scheduling [phone]    My Chart is available 24 hours a day and is a secure way to access your records and communicate with your care team.  I strongly recommend signing up if you haven't already done so, if you are comfortable with computers.  If you would like to inquire about this or are having problems with My Chart access, you may call [phone] or go online at [email or web address].  Please allow at least 24 hours for a response and extra time on weekends and Holidays.

## 2020-08-05 NOTE — LETTER
2020       RE: Tremayne Galarza  [address] [address]     Dear Colleague,    Thank you for referring your patient, Tremayne Galarza, to the [facility] COLON AND RECTAL SURGERY at [facility]. Please see a copy of my visit note below.    Colon and Rectal Surgery Postoperative Clinic Note     Referring provider:  Referred Self, MD  No address on file       RE: Tremayne Galarza  : 1960  STEFANIA: 2020    Tremayne Galarza is a very pleasant 60 year old male here for follow-up of anorectal fistula.     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     He developed two small boils on his buttock and hip with cultures showing staphylococcus aureus and he was treated with Augmentin.    MRI Pelvis 20:  1. 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  2. Near complete resolution of the previously seen posterior perianal  abscess, with minimal residual dilation of the superior fistula tract.  3. Skin and subcutaneous soft tissue thickening and enhancement in the  anterior left perineal region corresponding to the clinically visible  lesion.    Colonoscopy in  was normal.    He saw dermatology and was treated for folliculitis     Tremayne has had a large posterior fistula that is intersphincteric with a large internal orifice. This has gotten worse despite a seton. An Magnetic Resonance Imaging (MRI) 20 showed complexity and a horseshoe component to the right.    He is now status post examination under anesthesia with partial fistulotomy, debridement of the fistula and placement of a seton on 20 with a large internal orifice, extension to the deep post-anal space and horseshoeing to the right    Interval history: Tremayne reports that he is doing well. No pain. Minimal drainage. No difficulty with bowel movements.    PLEASE SEE  NOTE BELOW FOR PHYSICAL EXAMINATION, REVIEW OF SYSTEMS, AND OTHER HISTORY.    Assessment/Plan:  60 year old male with anorectal fistula with large internal orifice and extension to the deep post-anal space and horseshoeing to the right. He is doing quite well with minimal drainage and no pain. Two setons in place. I would like to give the internal cavity some more time to heal and would then like to re image him before removing one of the setons. Will get a 3T MRI in one month and if there is no branching and internal orifice has improved, will remove one of the setons going through the same tract. Will then give additional time for additional healing before considering fistulotomy. Tremayne is in agreement with this plan and I encouraged him to contact the clinic with any worsening symptoms in the meantime.    This is a postoperative visit. Total time was 15 minutes, over 50% was spent counseling the patient.     PLEASE SEE NOTE BELOW FOR PHYSICAL EXAMINATION, REVIEW OF SYSTEMS, AND OTHER HISTORY.    Camila Calderon MD  Colon and Rectal Surgery Staff  [facility]    -------------------------------------------------------------------------------------------------------------------    Medical history:  Past Medical History:   Diagnosis Date     Chronic ischemic heart disease 9/4/2014    Overview:  3 stents to RCA 8/2014      Essential hypertension 2/11/2005    Overview:  LW Onset:  [id number]      Fistula      Hyperlipidemia 11/14/2005    Overview:  LW Onset:  [id number]      Perirectal abscess      Staph infection        Surgical history:  Past Surgical History:   Procedure Laterality Date     BIOPSY       CARDIAC SURGERY       COLONOSCOPY N/A 12/30/2015    Procedure: COLONOSCOPY;  Surgeon: Duane, William Charles, MD;  Location: [facility] OR     COLONOSCOPY WITH CO2 INSUFFLATION N/A 12/30/2015    Procedure: COLONOSCOPY WITH CO2 INSUFFLATION;  Surgeon: Duane, William Charles, MD;  Location:  OR      EXAM UNDER ANESTHESIA ANUS N/A 1/10/2020    Procedure: EXAM UNDER ANESTHESIA, ANUS, inscision and drainage of perirectal abscess, partial fistulotomy,  seton placement;  Surgeon: Camila Calderon MD;  [facility]     EXAM UNDER ANESTHESIA ANUS N/A 6/19/2020    Procedure: Exam Under Anesthesia, Fistulotomy, Seton placement;  Surgeon: Camila Calderon MD;  [facility]     FISTULOTOMY RECTUM N/A 1/10/2020    Procedure: partial FISTULOTOMY, RECTUM;  Surgeon: Camila Calderon MD;  [facility]     PLACEMENT OF SETON RECTUM N/A 1/10/2020    Procedure: PLACEMENT, SETON STITCH;  Surgeon: Camila Calderon MD;  [facility] OR       Problem list:    Patient Active Problem List    Diagnosis Date Noted     Staph infection 03/03/2020     Priority: Medium     History of nonmelanoma skin cancer 03/03/2020     Priority: Medium     History of dysplastic nevus 03/03/2020     Priority: Medium     Perianal abscess 01/09/2020     Priority: Medium     Added automatically from request for surgery [id number]       Anal fistula 01/09/2020     Priority: Medium     Added automatically from request for surgery [id number]       Verruca plantaris 04/11/2018     Priority: Medium     LUQ abdominal pain 03/27/2018     Priority: Medium     CAD (coronary artery disease) 08/05/2015     Priority: Medium     Status post coronary angiogram 03/26/2015     Priority: Medium     Nondependent alcohol abuse 02/03/2015     Priority: Medium     Chronic ischemic heart disease 09/04/2014     Priority: Medium     Overview:   3 stents to RCA 8/2014       Postsurgical percutaneous transluminal coronary angioplasty status 09/04/2014     Priority: Medium     Benign neoplasm of colon 06/05/2013     Priority: Medium     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 Provider to review and confirm.       HTN, goal below 140/90 02/11/2005     Priority: Medium     Overview:   LW Onset:  11Feb05       Obesity 02/11/2005     Priority: Medium     Overview:   LW Onset:  11Feb05       Allergic rhinitis 02/11/2005     Priority: Medium     Overview:   LW Onset:  11Feb05         Medications:  Current Outpatient Medications   Medication Sig Dispense Refill     aspirin (CHILDRENS ASPIRIN) 81 MG chewable tablet Take 81 mg by mouth       atenolol (TENORMIN) 25 MG tablet Take 1 tablet (25 mg) by mouth daily 90 tablet 3     ezetimibe (ZETIA) 10 MG tablet TAKE ONE TABLET BY MOUTH ONCE DAILY 90 tablet 0     fexofenadine (ALLEGRA) 60 MG tablet Take 0.5 tablets (30 mg) by mouth daily 60 tablet 3     mupirocin (BACTROBAN) 2 % external ointment Use 3 times per day for 3 weeks. Then the first week of every month for 6 months. 60 g 2     rosuvastatin (CRESTOR) 40 MG tablet Take 1 tablet (40 mg) by mouth daily 90 tablet 3     sildenafil (VIAGRA) 50 MG tablet Take 1 tablet (50 mg) by mouth daily as needed (erectile dysfunction) as needed for ED 10 tablet 0       Allergies:  No Known Allergies    Family history:  Family History   Problem Relation Age of Onset     Coronary Artery Disease Mother      Hypertension Mother      Cerebrovascular Disease Mother        Social history:  Social History     Socioeconomic History     Marital status:      Spouse name: Not on file     Number of children: Not on file     Years of education: Not on file     Highest education level: Not on file   Occupational History     Not on file   Social Needs     Financial resource strain: Not on file     Food insecurity     Worry: Not on file     Inability: Not on file     Transportation needs     Medical: Not on file     Non-medical: Not on file   Tobacco Use     Smoking status: Never Smoker     Smokeless tobacco: Never Used   Substance and Sexual Activity     Alcohol use: Yes     Drug use: No     Sexual activity:  Yes     Partners: Female   Lifestyle     Physical activity     Days per week: Not on file     Minutes per session: Not on file     Stress: Not on file   Relationships     Social connections     Talks on phone: Not on file     Gets together: Not on file     Attends Christian service: Not on file     Active member of club or organization: Not on file     Attends meetings of clubs or organizations: Not on file     Relationship status: Not on file     Intimate partner violence     Fear of current or ex partner: Not on file     Emotionally abused: Not on file     Physically abused: Not on file     Forced sexual activity: Not on file   Other Topics Concern     Parent/sibling w/ CABG, MI or angioplasty before 65F 55M? Not Asked   Social History Narrative     Not on file         Nursing Notes:   Cornelius Chavez LPN  8/5/2020  3:18 PM  Signed  Chief Complaint   Patient presents with     RECHECK     Post-op DOS 6/19/2020       Vitals:    08/05/20 1515   BP: (!) 140/89   BP Location: Left arm   Patient Position: Sitting   Cuff Size: Adult Regular   Pulse: 51   SpO2: 97%   Weight: 207 lb 3.2 oz   Height: 6'       Body mass index is 28.1 kg/m .      Cornelius Chaevz LPN                           Physical Examination:  BP (!) 140/89 (BP Location: Left arm, Patient Position: Sitting, Cuff Size: Adult Regular)   Pulse 51   Ht 1.829 m (6')   Wt 94 kg (207 lb 3.2 oz)   SpO2 97%   BMI 28.10 kg/m    General: alert, oriented, in no acute distress, sitting comfortably  HEENT: mucous membranes moist  Perianal external examination:  Two yellow vessel loops setons in place in the posterior position with external site healed up to the seton. Minimal drainage and no induration or fluctuance    Digital rectal examination: Was deferred.    Anoscopy: Was deferred.      Again, thank you for allowing me to participate in the care of your patient.      Sincerely,    Camila Calderon MD

## 2020-09-08 DIAGNOSIS — E78.00 HYPERCHOLESTEREMIA: Primary | ICD-10-CM

## 2020-09-09 ENCOUNTER — ANCILLARY PROCEDURE (OUTPATIENT)
Dept: MRI IMAGING | Facility: CLINIC | Age: 60
End: 2020-09-09
Attending: COLON & RECTAL SURGERY
Payer: COMMERCIAL

## 2020-09-09 DIAGNOSIS — K60.30 ANAL FISTULA: ICD-10-CM

## 2020-09-09 RX ORDER — GADOBUTROL 604.72 MG/ML
10 INJECTION INTRAVENOUS ONCE
Status: COMPLETED | OUTPATIENT
Start: 2020-09-09 | End: 2020-09-09

## 2020-09-09 RX ADMIN — GADOBUTROL 10 ML: 604.72 INJECTION INTRAVENOUS at 14:26

## 2020-09-11 RX ORDER — EZETIMIBE 10 MG/1
10 TABLET ORAL DAILY
Qty: 90 TABLET | Refills: 2 | Status: SHIPPED | OUTPATIENT
Start: 2020-09-11 | End: 2021-10-28

## 2020-09-14 NOTE — PROGRESS NOTES
Colon and Rectal Surgery Postoperative Clinic Note     Referring provider:  Referred Self, MD  No address on file     RE: Tremayne Galarza  : 1960  STEFANIA: 2020    Tremayne Galarza is a very pleasant 60 year old male here for follow-up of anorectal fistula.     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     He developed two small boils on his buttock and hip with cultures showing staphylococcus aureus and he was treated with Augmentin.    MRI Pelvis 20:  1. 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  2. Near complete resolution of the previously seen posterior perianal  abscess, with minimal residual dilation of the superior fistula tract.  3. Skin and subcutaneous soft tissue thickening and enhancement in the  anterior left perineal region corresponding to the clinically visible  lesion.    Colonoscopy in  was normal.    He saw dermatology and was treated for folliculitis     Tremayne has had a large posterior fistula that is intersphincteric with a large internal orifice. This has gotten worse despite a seton. An Magnetic Resonance Imaging (MRI) 20 showed complexity and a horseshoe component to the right.    He is now status post examination under anesthesia with partial fistulotomy, debridement of the fistula and placement of a seton on 20 with a large internal orifice, extension to the deep post-anal space and horseshoeing to the right    I saw him in clinic on 20 and wanted to give the internal cavity some more time to heal before removing one of the setons and to get an MRI to ensure there was no branching and internal orificehas improved.    MR Pelvis 20:  Redemonstration of the posterior midline intersphincteric  fistula with seton in place which again bifurcates into superior and  inferior component. Decreased  enhancement and T2 hyperintensity of the  primary tract since prior, improving but persistent active  inflammation. No new fistula formation.    Interval history: Tremayne reports that he is doing well. No pain. Minimal drainage. No difficulty with bowel movements. Some stool leakage with the setons but less. The Magnetic Resonance Imaging (MRI) was reviewed and there continues to be active disease and some complexity.     PLEASE SEE NOTE BELOW FOR PHYSICAL EXAMINATION, REVIEW OF SYSTEMS, AND OTHER HISTORY.    Assessment/Plan:  60 year old male with anorectal fistula with large internal orifice and extension to the deep post-anal space and horseshoeing to the right. He is doing quite well with minimal drainage and no pain. Two setons in place and I removed one today. The Magnetic Resonance Imaging (MRI) demonstrates ongoing complex disease. This will either help or he may have additional issues and we would then expect to do an examination under anesthesia and to debride the area and possibly need to put a drain in to back the drain out to help the fistula heal. Tremayne is in agreement with this plan and I encouraged him to contact the clinic with any worsening symptoms in the meantime.    This is a postoperative visit. Total time was 15 minutes, over 50% was spent counseling the patient.     PLEASE SEE NOTE BELOW FOR PHYSICAL EXAMINATION, REVIEW OF SYSTEMS, AND OTHER HISTORY.    Camila Calderon MD  Colon and Rectal Surgery Staff  [facility]    -------------------------------------------------------------------------------------------------------------------    Medical history:  Past Medical History:   Diagnosis Date     Chronic ischemic heart disease 9/4/2014    Overview:  3 stents to RCA 8/2014      Essential hypertension 2/11/2005    Overview:  LW Onset:  [id number]      Fistula      Hyperlipidemia 11/14/2005    Overview:  LW Onset:  14Nov05      Perirectal abscess      Staph infection         Surgical history:  Past Surgical History:   Procedure Laterality Date     BIOPSY       CARDIAC SURGERY       COLONOSCOPY N/A 12/30/2015    Procedure: COLONOSCOPY;  Surgeon: Duane, William Charles, MD;  Location: [facility] OR     COLONOSCOPY WITH CO2 INSUFFLATION N/A 12/30/2015    Procedure: COLONOSCOPY WITH CO2 INSUFFLATION;  Surgeon: Duane, William Charles, MD;  Location: [facility] OR     EXAM UNDER ANESTHESIA ANUS N/A 1/10/2020    Procedure: EXAM UNDER ANESTHESIA, ANUS, inscision and drainage of perirectal abscess, partial fistulotomy,  seton placement;  Surgeon: Camila Calderon MD;  [facility]     EXAM UNDER ANESTHESIA ANUS N/A 6/19/2020    Procedure: Exam Under Anesthesia, Fistulotomy, Seton placement;  Surgeon: Camila Calderon MD;  [facility]     FISTULOTOMY RECTUM N/A 1/10/2020    Procedure: partial FISTULOTOMY, RECTUM;  Surgeon: Camila Calderon MD;  [facility]     PLACEMENT OF SETON RECTUM N/A 1/10/2020    Procedure: PLACEMENT, SETON STITCH;  Surgeon: Camila Calderon MD;  [facility]       Problem list:    Patient Active Problem List    Diagnosis Date Noted     Staph infection 03/03/2020     Priority: Medium     History of nonmelanoma skin cancer 03/03/2020     Priority: Medium     History of dysplastic nevus 03/03/2020     Priority: Medium     Perianal abscess 01/09/2020     Priority: Medium     Added automatically from request for surgery [id number]       Anal fistula 01/09/2020     Priority: Medium     Added automatically from request for surgery [id number]       Verruca plantaris 04/11/2018     Priority: Medium     LUQ abdominal pain 03/27/2018     Priority: Medium     CAD (coronary artery disease) 08/05/2015     Priority: Medium     Status post coronary angiogram 03/26/2015     Priority: Medium     Nondependent alcohol abuse 02/03/2015     Priority: Medium     Chronic ischemic heart disease 09/04/2014     Priority: Medium     Overview:   3 stents to RCA  8/2014       Postsurgical percutaneous transluminal coronary angioplasty status 09/04/2014     Priority: Medium     Benign neoplasm of colon 06/05/2013     Priority: Medium     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 Provider to review and confirm.       HTN, goal below 140/90 02/11/2005     Priority: Medium     Overview:   LW Onset:  [id number]       Obesity 02/11/2005     Priority: Medium     Overview:   LW Onset:  11Feb05       Allergic rhinitis 02/11/2005     Priority: Medium     Overview:   LW Onset:  11Feb05         Medications:  Current Outpatient Medications   Medication Sig Dispense Refill     aspirin (CHILDRENS ASPIRIN) 81 MG chewable tablet Take 81 mg by mouth       atenolol (TENORMIN) 25 MG tablet Take 1 tablet (25 mg) by mouth daily 90 tablet 3     ezetimibe (ZETIA) 10 MG tablet Take 1 tablet (10 mg) by mouth daily 90 tablet 2     fexofenadine (ALLEGRA) 60 MG tablet Take 0.5 tablets (30 mg) by mouth daily 60 tablet 3     mupirocin (BACTROBAN) 2 % external ointment Use 3 times per day for 3 weeks. Then the first week of every month for 6 months. 60 g 2     rosuvastatin (CRESTOR) 40 MG tablet Take 1 tablet (40 mg) by mouth daily 90 tablet 3     sildenafil (VIAGRA) 50 MG tablet Take 1 tablet (50 mg) by mouth daily as needed (erectile dysfunction) as needed for ED 10 tablet 0       Allergies:  No Known Allergies    Family history:  Family History   Problem Relation Age of Onset     Coronary Artery Disease Mother      Hypertension Mother      Cerebrovascular Disease Mother        Social history:  Social History     Socioeconomic History     Marital status:      Spouse name: Not on file     Number of children: Not on file     Years of education: Not on file     Highest education level: Not on file   Occupational History     Not on file    Social Needs     Financial resource strain: Not on file     Food insecurity     Worry: Not on file     Inability: Not on file     Transportation needs     Medical: Not on file     Non-medical: Not on file   Tobacco Use     Smoking status: Never Smoker     Smokeless tobacco: Never Used   Substance and Sexual Activity     Alcohol use: Yes     Drug use: No     Sexual activity: Yes     Partners: Female   Lifestyle     Physical activity     Days per week: Not on file     Minutes per session: Not on file     Stress: Not on file   Relationships     Social connections     Talks on phone: Not on file     Gets together: Not on file     Attends [employer] service: Not on file     Active member of club or organization: Not on file     Attends meetings of clubs or organizations: Not on file     Relationship status: Not on file     Intimate partner violence     Fear of current or ex partner: Not on file     Emotionally abused: Not on file     Physically abused: Not on file     Forced sexual activity: Not on file   Other Topics Concern     Parent/sibling w/ CABG, MI or angioplasty before 65F 55M? Not Asked   Social History Narrative     Not on file         Nursing Notes:   Cornelius Chavez LPN  9/16/2020  3:10 PM  Signed  Chief Complaint   Patient presents with     RECHECK     Anal fistula. Discuss MRI       Vitals:    09/16/20 1507   BP: 118/76   BP Location: Left arm   Patient Position: Sitting   Cuff Size: Adult Regular   Pulse: 75   SpO2: 94%   Weight: 207 lb   Height: 6'       Body mass index is 28.07 kg/m .      Cornelius Chavez LPN                           Physical Examination:  /76 (BP Location: Left arm, Patient Position: Sitting, Cuff Size: Adult Regular)   Pulse 75   Ht 1.829 m (6')   Wt 93.9 kg (207 lb)   SpO2 94%   BMI 28.07 kg/m    General: alert, oriented, in no acute distress, sitting comfortably  HEENT: mucous membranes moist  Perianal external examination:  Two yellow vessel loops setons in place in the  posterior position with external site healed up to the seton. Minimal drainage and no induration or fluctuance. One of the 2 setons was removed today without difficulty    Digital rectal examination: Was deferred.    Anoscopy: Was deferred.

## 2020-09-16 ENCOUNTER — OFFICE VISIT (OUTPATIENT)
Dept: SURGERY | Facility: CLINIC | Age: 60
End: 2020-09-16
Payer: COMMERCIAL

## 2020-09-16 VITALS
WEIGHT: 207 LBS | OXYGEN SATURATION: 94 % | HEART RATE: 75 BPM | HEIGHT: 72 IN | BODY MASS INDEX: 28.04 KG/M2 | SYSTOLIC BLOOD PRESSURE: 118 MMHG | DIASTOLIC BLOOD PRESSURE: 76 MMHG

## 2020-09-16 DIAGNOSIS — K60.30 ANAL FISTULA: Primary | ICD-10-CM

## 2020-09-16 DIAGNOSIS — Z09 FOLLOW-UP EXAMINATION FOLLOWING SURGERY: ICD-10-CM

## 2020-09-16 ASSESSMENT — MIFFLIN-ST. JEOR: SCORE: 1796.95

## 2020-09-16 ASSESSMENT — PAIN SCALES - GENERAL: PAINLEVEL: NO PAIN (0)

## 2020-09-16 NOTE — LETTER
2020       RE: Tremayne Galarza  [address] [facility] [address]     Dear Colleague,    Thank you for referring your patient, Tremayne Galarza, to the [facility] COLON AND RECTAL SURGERY at [facility]. Please see a copy of my visit note below.    Colon and Rectal Surgery Postoperative Clinic Note     Referring provider:  Referred Self, MD  No address on file     RE: Tremayne Galarza  : 1960  STEFANIA: 2020    Tremayne Galarza is a very pleasant 60 year old male here for follow-up of anorectal fistula.     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     He developed two small boils on his buttock and hip with cultures showing staphylococcus aureus and he was treated with Augmentin.    MRI Pelvis 20:  1. 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  2. Near complete resolution of the previously seen posterior perianal  abscess, with minimal residual dilation of the superior fistula tract.  3. Skin and subcutaneous soft tissue thickening and enhancement in the  anterior left perineal region corresponding to the clinically visible  lesion.    Colonoscopy in  was normal.    He saw dermatology and was treated for folliculitis     Tremayne has had a large posterior fistula that is intersphincteric with a large internal orifice. This has gotten worse despite a seton. An Magnetic Resonance Imaging (MRI) 20 showed complexity and a horseshoe component to the right.    He is now status post examination under anesthesia with partial fistulotomy, debridement of the fistula and placement of a seton on 20 with a large internal orifice, extension to the deep post-anal space and horseshoeing to the right    I saw him in clinic on 20 and wanted to give the internal cavity some more time to heal before removing  one of the setons and to get an MRI to ensure there was no branching and internal orificehas improved.    MR Pelvis 9/9/20:  Redemonstration of the posterior midline intersphincteric  fistula with seton in place which again bifurcates into superior and  inferior component. Decreased enhancement and T2 hyperintensity of the  primary tract since prior, improving but persistent active  inflammation. No new fistula formation.    Interval history: Tremayne reports that he is doing well. No pain. Minimal drainage. No difficulty with bowel movements. Some stool leakage with the setons but less. The Magnetic Resonance Imaging (MRI) was reviewed and there continues to be active disease and some complexity.     PLEASE SEE NOTE BELOW FOR PHYSICAL EXAMINATION, REVIEW OF SYSTEMS, AND OTHER HISTORY.    Assessment/Plan:  60 year old male with anorectal fistula with large internal orifice and extension to the deep post-anal space and horseshoeing to the right. He is doing quite well with minimal drainage and no pain. Two setons in place and I removed one today. The Magnetic Resonance Imaging (MRI) demonstrates ongoing complex disease. This will either help or he may have additional issues and we would then expect to do an examination under anesthesia and to debride the area and possibly need to put a drain in to back the drain out to help the fistula heal. Tremayne is in agreement with this plan and I encouraged him to contact the clinic with any worsening symptoms in the meantime.    This is a postoperative visit. Total time was 15 minutes, over 50% was spent counseling the patient.     PLEASE SEE NOTE BELOW FOR PHYSICAL EXAMINATION, REVIEW OF SYSTEMS, AND OTHER HISTORY.    Camila Calderon MD  Colon and Rectal Surgery Staff  [facility]  -------------------------------------------------------------------------------------------------------------------  Medical history:  Past Medical History:    Diagnosis Date     Chronic ischemic heart disease 9/4/2014    Overview:  3 stents to RCA 8/2014      Essential hypertension 2/11/2005    Overview:  LW Onset:  [id number]      Fistula      Hyperlipidemia 11/14/2005    Overview:  LW Onset:  14Nov05      Perirectal abscess      Staph infection        Surgical history:  Past Surgical History:   Procedure Laterality Date     BIOPSY       CARDIAC SURGERY       COLONOSCOPY N/A 12/30/2015    Procedure: COLONOSCOPY;  Surgeon: Duane, William Charles, MD;  Location: [facility] OR     COLONOSCOPY WITH CO2 INSUFFLATION N/A 12/30/2015    Procedure: COLONOSCOPY WITH CO2 INSUFFLATION;  Surgeon: Duane, William Charles, MD;  Location: [facility] OR     EXAM UNDER ANESTHESIA ANUS N/A 1/10/2020    Procedure: EXAM UNDER ANESTHESIA, ANUS, inscision and drainage of perirectal abscess, partial fistulotomy,  seton placement;  Surgeon: Camila Calderon MD;  [facility]     EXAM UNDER ANESTHESIA ANUS N/A 6/19/2020    Procedure: Exam Under Anesthesia, Fistulotomy, Seton placement;  Surgeon: Camila Calderon MD;  [facility]     FISTULOTOMY RECTUM N/A 1/10/2020    Procedure: partial FISTULOTOMY, RECTUM;  Surgeon: Camila Calderon MD;  [facility]     PLACEMENT OF SETON RECTUM N/A 1/10/2020    Procedure: PLACEMENT, SETON STITCH;  Surgeon: Camila Calderon MD;  [facility]     Problem list:    Patient Active Problem List    Diagnosis Date Noted     Staph infection 03/03/2020     Priority: Medium     History of nonmelanoma skin cancer 03/03/2020     Priority: Medium     History of dysplastic nevus 03/03/2020     Priority: Medium     Perianal abscess 01/09/2020     Priority: Medium     Added automatically from request for surgery [id number]       Anal fistula 01/09/2020     Priority: Medium     Added automatically from request for surgery [id number]       Verruca plantaris 04/11/2018     Priority: Medium     LUQ abdominal pain 03/27/2018     Priority: Medium     CAD  (coronary artery disease) 08/05/2015     Priority: Medium     Status post coronary angiogram 03/26/2015     Priority: Medium     Nondependent alcohol abuse 02/03/2015     Priority: Medium     Chronic ischemic heart disease 09/04/2014     Priority: Medium     Overview:   3 stents to RCA 8/2014       Postsurgical percutaneous transluminal coronary angioplasty status 09/04/2014     Priority: Medium     Benign neoplasm of colon 06/05/2013     Priority: Medium     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 Provider to review and confirm.       HTN, goal below 140/90 02/11/2005     Priority: Medium     Overview:   LW Onset:  [id number]       Obesity 02/11/2005     Priority: Medium     Overview:   LW Onset:  11Feb05       Allergic rhinitis 02/11/2005     Priority: Medium     Overview:   LW Onset:  11Feb05       Medications:  Current Outpatient Medications   Medication Sig Dispense Refill     aspirin (CHILDRENS ASPIRIN) 81 MG chewable tablet Take 81 mg by mouth       atenolol (TENORMIN) 25 MG tablet Take 1 tablet (25 mg) by mouth daily 90 tablet 3     ezetimibe (ZETIA) 10 MG tablet Take 1 tablet (10 mg) by mouth daily 90 tablet 2     fexofenadine (ALLEGRA) 60 MG tablet Take 0.5 tablets (30 mg) by mouth daily 60 tablet 3     mupirocin (BACTROBAN) 2 % external ointment Use 3 times per day for 3 weeks. Then the first week of every month for 6 months. 60 g 2     rosuvastatin (CRESTOR) 40 MG tablet Take 1 tablet (40 mg) by mouth daily 90 tablet 3     sildenafil (VIAGRA) 50 MG tablet Take 1 tablet (50 mg) by mouth daily as needed (erectile dysfunction) as needed for ED 10 tablet 0     Allergies:  No Known Allergies    Family history:  Family History   Problem Relation Age of Onset     Coronary Artery Disease Mother      Hypertension Mother      Cerebrovascular  Disease Mother      Social history:  Social History     Socioeconomic History     Marital status:      Spouse name: Not on file     Number of children: Not on file     Years of education: Not on file     Highest education level: Not on file   Occupational History     Not on file   Social Needs     Financial resource strain: Not on file     Food insecurity     Worry: Not on file     Inability: Not on file     Transportation needs     Medical: Not on file     Non-medical: Not on file   Tobacco Use     Smoking status: Never Smoker     Smokeless tobacco: Never Used   Substance and Sexual Activity     Alcohol use: Yes     Drug use: No     Sexual activity: Yes     Partners: Female   Lifestyle     Physical activity     Days per week: Not on file     Minutes per session: Not on file     Stress: Not on file   Relationships     Social connections     Talks on phone: Not on file     Gets together: Not on file     Attends Sabianist service: Not on file     Active member of club or organization: Not on file     Attends meetings of clubs or organizations: Not on file     Relationship status: Not on file     Intimate partner violence     Fear of current or ex partner: Not on file     Emotionally abused: Not on file     Physically abused: Not on file     Forced sexual activity: Not on file   Other Topics Concern     Parent/sibling w/ CABG, MI or angioplasty before 65F 55M? Not Asked   Social History Narrative     Not on file     Nursing Notes:   Cornelius Chavez LPN  9/16/2020  3:10 PM  Signed  Chief Complaint   Patient presents with     RECHECK     Anal fistula. Discuss MRI       Vitals:    09/16/20 1507   BP: 118/76   BP Location: Left arm   Patient Position: Sitting   Cuff Size: Adult Regular   Pulse: 75   SpO2: 94%   Weight: 207 lb   Height: 6'     Body mass index is 28.07 kg/m .    Cornelius Chavez LPN     Physical Examination:  /76 (BP Location: Left arm, Patient Position: Sitting, Cuff Size: Adult Regular)   Pulse 75    Ht 1.829 m (6')   Wt 93.9 kg (207 lb)   SpO2 94%   BMI 28.07 kg/m    General: alert, oriented, in no acute distress, sitting comfortably  HEENT: mucous membranes moist  Perianal external examination:  Two yellow vessel loops setons in place in the posterior position with external site healed up to the seton. Minimal drainage and no induration or fluctuance. One of the 2 setons was removed today without difficulty    Digital rectal examination: Was deferred.    Anoscopy: Was deferred.      Again, thank you for allowing me to participate in the care of your patient.  Sincerely,    Camila Calderon MD

## 2020-09-16 NOTE — NURSING NOTE
Chief Complaint   Patient presents with     RECHECK     Anal fistula. Discuss MRI       Vitals:    09/16/20 1507   BP: 118/76   BP Location: Left arm   Patient Position: Sitting   Cuff Size: Adult Regular   Pulse: 75   SpO2: 94%   Weight: 207 lb   Height: 6'       Body mass index is 28.07 kg/m .      Cornelius Chavez LPN

## 2020-09-18 ENCOUNTER — TELEPHONE (OUTPATIENT)
Dept: SURGERY | Facility: CLINIC | Age: 60
End: 2020-09-18

## 2020-09-18 ENCOUNTER — HOSPITAL ENCOUNTER (OUTPATIENT)
Facility: AMBULATORY SURGERY CENTER | Age: 60
End: 2020-09-18
Attending: COLON & RECTAL SURGERY
Payer: COMMERCIAL

## 2020-09-18 DIAGNOSIS — Z11.59 ENCOUNTER FOR SCREENING FOR OTHER VIRAL DISEASES: Primary | ICD-10-CM

## 2020-09-18 NOTE — TELEPHONE ENCOUNTER
Dr. Calderon had discussed possible surgery date 10/9/2020 with patient, who wants that date because it is soon after he returns from out of town on 10/4/2020. At this time 10/9/2020 is not available in the [facility] OR, so patient added to the wait list for that date. Will schedule patient on 10/23/2020, but if 10/9/2020 opens up, I told patient I would give him a call to see if he wants to move up to that date.    Spoke with patient, completed the following scheduling:    Patient is scheduled for surgery with Dr. Calderon    Spoke with Tremayne    Date of Surgery: 10/23/2020    Location: [facility]    Informed patient they will need an adult  Yes    H&P: to be scheduled by patient with PCP on-file within 30 days before surgery date; I had suggested that he might schedule it the same day as his COVID-19 test, since it will be at the same clinic in [place]    COVID-19 test scheduled at [facility] on 10/20/2020 at 8:15 am    Post-op with Juliette Giles NP, on 11/5/2020 at 7:00 am    Surgery packet: will send via [employer] and Mail     Additional comments:   - wait list for [facility] surgery date 10/9/2020

## 2020-10-01 ENCOUNTER — TELEPHONE (OUTPATIENT)
Dept: SURGERY | Facility: CLINIC | Age: 60
End: 2020-10-01

## 2020-10-02 NOTE — TELEPHONE ENCOUNTER
Patient's [facility] surgery was scheduled on 10/23/2020, but he was on the wait list to move up if anything opened up on 10/9/2020 with Dr. Calderon.    I called patient to let him know that a slot opened up on 10/9/2020, he wanted to take it, so rescheduled accordingly.    Patient is scheduled for surgery with Dr. Yumiko Bhatt with Tremayne    Date of Surgery: 10/9/2020    Location: [facility]    Informed patient they will need an adult  [facility] Pre-op Appts:    - COVID-19 test on 10/6/2020 at 9:30 am    - H&P: Scheduled with Charissa Prak NP, on 10/6/2020 at 9:50 AM    [facility] appointment:    - Post-op with Juliette Giles NP, on 11/5/2020 at 7:00 am    Surgery packet: sending via [employer] and Mail

## 2020-10-08 ENCOUNTER — TELEPHONE (OUTPATIENT)
Dept: SURGERY | Facility: CLINIC | Age: 60
End: 2020-10-08

## 2020-10-08 DIAGNOSIS — K60.30 ANAL FISTULA: Primary | ICD-10-CM

## 2020-10-08 RX ORDER — ONDANSETRON 2 MG/ML
4 INJECTION INTRAMUSCULAR; INTRAVENOUS EVERY 30 MIN PRN
Status: CANCELLED | OUTPATIENT
Start: 2020-10-08

## 2020-10-08 RX ORDER — ONDANSETRON 4 MG/1
4 TABLET, ORALLY DISINTEGRATING ORAL EVERY 30 MIN PRN
Status: CANCELLED | OUTPATIENT
Start: 2020-10-08

## 2020-10-08 RX ORDER — KETOROLAC TROMETHAMINE 30 MG/ML
30 INJECTION, SOLUTION INTRAMUSCULAR; INTRAVENOUS EVERY 6 HOURS PRN
Status: CANCELLED | OUTPATIENT
Start: 2020-10-08 | End: 2020-10-13

## 2020-10-08 RX ORDER — ACETAMINOPHEN 325 MG/1
975 TABLET ORAL ONCE
Status: CANCELLED | OUTPATIENT
Start: 2020-10-08 | End: 2020-10-08

## 2020-10-08 RX ORDER — NALOXONE HYDROCHLORIDE 0.4 MG/ML
.1-.4 INJECTION, SOLUTION INTRAMUSCULAR; INTRAVENOUS; SUBCUTANEOUS
Status: CANCELLED | OUTPATIENT
Start: 2020-10-08 | End: 2020-10-09

## 2020-10-08 RX ORDER — MEPERIDINE HYDROCHLORIDE 25 MG/ML
12.5 INJECTION INTRAMUSCULAR; INTRAVENOUS; SUBCUTANEOUS
Status: CANCELLED | OUTPATIENT
Start: 2020-10-08

## 2020-10-08 RX ORDER — FENTANYL CITRATE 50 UG/ML
25-50 INJECTION, SOLUTION INTRAMUSCULAR; INTRAVENOUS
Status: CANCELLED | OUTPATIENT
Start: 2020-10-08

## 2020-10-08 RX ORDER — SODIUM CHLORIDE, SODIUM LACTATE, POTASSIUM CHLORIDE, CALCIUM CHLORIDE 600; 310; 30; 20 MG/100ML; MG/100ML; MG/100ML; MG/100ML
INJECTION, SOLUTION INTRAVENOUS CONTINUOUS
Status: CANCELLED | OUTPATIENT
Start: 2020-10-08

## 2020-10-08 RX ORDER — OXYCODONE HYDROCHLORIDE 5 MG/1
5 TABLET ORAL EVERY 4 HOURS PRN
Status: CANCELLED | OUTPATIENT
Start: 2020-10-08

## 2020-10-08 NOTE — TELEPHONE ENCOUNTER
"Received call from [facility] that patient is supposed to have an MRI before surgery, so surgery with Dr. Calderon is being cancelled for tomorrow.     Patient had an MRI on 9/9/2020. I called BRIANNA Mora, she had not heard anything about this.    Called [facility] back, they said that their \"special needs\" notes indicate that the MRI is needed before surgery. She had thought that this came from BRIANNA Mora.    Phone call to patient, he states that Dr. Calderon wanted him to get another MRI to see if things have improved. If so, he said that she said he may not need surgery after all. He said 1st time and location available for an MRI is fine with him.    BRIANNA Mora entered MRI orders, and I called Imaging scheduling and got his MRI scheduled on 10/15/2020 at 7:15 am, check-in at 6:45 am, at [facility] ([place] Called patient, he is agreeable to this appointment. I also sent him the appointment info via [employer], including all of the MRI instructions.    I called [facility] OR scheduling, moved his case to 10/23/2020 for now. Dr. Calderon said it will likely be cancelled, but to keep it there until she reviews the MRI. Patient updated of this plan.  "

## 2020-10-15 ENCOUNTER — HOSPITAL ENCOUNTER (OUTPATIENT)
Dept: MRI IMAGING | Facility: CLINIC | Age: 60
Discharge: HOME | End: 2020-10-15
Attending: COLON & RECTAL SURGERY | Admitting: COLON & RECTAL SURGERY
Payer: COMMERCIAL

## 2020-10-15 DIAGNOSIS — K60.30 ANAL FISTULA: ICD-10-CM

## 2020-10-15 PROCEDURE — A9585 GADOBUTROL INJECTION: HCPCS | Performed by: COLON & RECTAL SURGERY

## 2020-10-15 PROCEDURE — 255N000002 HC RX 255 OP 636: Performed by: COLON & RECTAL SURGERY

## 2020-10-15 PROCEDURE — 72197 MRI PELVIS W/O & W/DYE: CPT | Mod: 26 | Performed by: RADIOLOGY

## 2020-10-15 PROCEDURE — 72197 MRI PELVIS W/O & W/DYE: CPT

## 2020-10-15 RX ORDER — GADOBUTROL 604.72 MG/ML
7.5 INJECTION INTRAVENOUS ONCE
Status: DISCONTINUED | OUTPATIENT
Start: 2020-10-15 | End: 2020-10-15

## 2020-10-15 RX ORDER — GADOBUTROL 604.72 MG/ML
10 INJECTION INTRAVENOUS ONCE
Status: COMPLETED | OUTPATIENT
Start: 2020-10-15 | End: 2020-10-15

## 2020-10-15 RX ADMIN — GADOBUTROL 9.3 ML: 604.72 INJECTION INTRAVENOUS at 07:28

## 2020-10-16 ENCOUNTER — TELEPHONE (OUTPATIENT)
Dept: SURGERY | Facility: CLINIC | Age: 60
End: 2020-10-16

## 2020-10-16 NOTE — TELEPHONE ENCOUNTER
Sent [employer] message regarding post-op appointment.    Eliezer Mclean, EMT  Colon and Rectal Surgery

## 2020-10-20 ENCOUNTER — PATIENT OUTREACH (OUTPATIENT)
Dept: SURGERY | Facility: CLINIC | Age: 60
End: 2020-10-20

## 2020-11-23 DIAGNOSIS — N52.9 ERECTILE DYSFUNCTION, UNSPECIFIED ERECTILE DYSFUNCTION TYPE: ICD-10-CM

## 2020-11-27 NOTE — TELEPHONE ENCOUNTER
sildenafil (VIAGRA) 50 MG tablet  Last Written Prescription Date:  4/6/20  Last Fill Quantity: 10,   # refills: 0  Last Office Visit : 4/6/20  Future Office visit:  None      Routing refill request to provider for review/approval because: not on protocol for ascc dx.

## 2020-11-28 RX ORDER — SILDENAFIL 50 MG/1
50 TABLET, FILM COATED ORAL DAILY PRN
Qty: 10 TABLET | Refills: 0 | Status: SHIPPED | OUTPATIENT
Start: 2020-11-28 | End: 2022-05-11

## 2020-12-07 DIAGNOSIS — K60.30 ANAL FISTULA: Primary | ICD-10-CM

## 2020-12-09 ENCOUNTER — HOSPITAL ENCOUNTER (OUTPATIENT)
Dept: MRI IMAGING | Facility: CLINIC | Age: 60
Discharge: HOME | End: 2020-12-09
Attending: COLON & RECTAL SURGERY | Admitting: COLON & RECTAL SURGERY
Payer: COMMERCIAL

## 2020-12-09 DIAGNOSIS — K60.30 ANAL FISTULA: ICD-10-CM

## 2020-12-09 PROCEDURE — 72197 MRI PELVIS W/O & W/DYE: CPT | Mod: 26 | Performed by: RADIOLOGY

## 2020-12-09 PROCEDURE — A9585 GADOBUTROL INJECTION: HCPCS | Performed by: COLON & RECTAL SURGERY

## 2020-12-09 PROCEDURE — 72197 MRI PELVIS W/O & W/DYE: CPT

## 2020-12-09 PROCEDURE — 255N000002 HC RX 255 OP 636: Performed by: COLON & RECTAL SURGERY

## 2020-12-09 RX ORDER — GADOBUTROL 604.72 MG/ML
10 INJECTION INTRAVENOUS ONCE
Status: COMPLETED | OUTPATIENT
Start: 2020-12-09 | End: 2020-12-09

## 2020-12-09 RX ADMIN — GADOBUTROL 9.3 ML: 604.72 INJECTION INTRAVENOUS at 15:35

## 2020-12-22 NOTE — PROGRESS NOTES
Colon and Rectal Surgery Clinic Note     Referring provider:  Referred Self, MD  No address on file     RE: Tremayne Galarza  : 1960  STEFANIA: 2020    Tremayne Galarza is a very pleasant 60 year old male here for follow-up of anorectal fistula.     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     He developed two small boils on his buttock and hip with cultures showing staphylococcus aureus and he was treated with Augmentin.    MRI Pelvis 20:  1. 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  2. Near complete resolution of the previously seen posterior perianal  abscess, with minimal residual dilation of the superior fistula tract.  3. Skin and subcutaneous soft tissue thickening and enhancement in the  anterior left perineal region corresponding to the clinically visible  lesion.    Colonoscopy in  was normal.    He saw dermatology and was treated for folliculitis     Tremayne has had a large posterior fistula that is intersphincteric with a large internal orifice. This has gotten worse despite a seton. An Magnetic Resonance Imaging (MRI) 20 showed complexity and a horseshoe component to the right.    He is now status post examination under anesthesia with partial fistulotomy, debridement of the fistula and placement of a seton on 20 with a large internal orifice, extension to the deep post-anal space and horseshoeing to the right    I saw him in clinic on 20 and wanted to give the internal cavity some more time to heal before removing one of the setons and to get an MRI to ensure there was no branching and internal orificehas improved.    MR Pelvis 20:  Redemonstration of the posterior midline intersphincteric  fistula with seton in place which again bifurcates into superior and  inferior component. Decreased enhancement and T2  hyperintensity of the  primary tract since prior, improving but persistent active  inflammation. No new fistula formation.    I met with Tremayne in September and removed one of his setons at that time.    MR Pelvis 10/15/20:  Impression:   1. Redemonstration of posterior midline intersphincteric fistula with  seton in place, showing ramifications and slightly decreased  horseshoeing component on the right side with persistent inflammatory  change.  2. No new fistula or abscess.    MR Pelvis 12/9/20:  Impression:   1. Redemonstration of posterior midline intersphincteric fistula with  seton in place with minimally decreased horseshoeing component on the  right side with persistent inflammatory changes.  2. No new fistula or abscess is identified.    Interval history: Overall, he is doing well with minimal drainage and functioning well at work.  He has been over a year since this began.  The seton remains in place.    PLEASE SEE NOTE BELOW FOR PHYSICAL EXAMINATION, REVIEW OF SYSTEMS, AND OTHER HISTORY.    Assessment/Plan:  60 year old male with anorectal fistula with large internal orifice and extension to the deep post-anal space and horseshoeing to the right.  Overall he is significantly improved with minimal drainage at this point in a MRI that is improved.  We reviewed the findings of it which still do show a small amount of additional horseshoeing on the right.  On examination today, the internal orifice appears to be more proximal only involving about half of the internal anal sphincter.  I encouraged Tremayne to start tugging on the seton.  We will have the option of potentially doing a completion fistulotomy versus a stage procedure.  I would like to do this in approximately 1 month.  We will schedule him for a examination under anesthesia with possible completion fistulotomy versus partial fistulotomy.  He will need to be seen in [facility] ([facility]).  Answer multiple of his questions.    Total  time was 15 minutes, over 50% was spent counseling the patient.     PLEASE SEE NOTE BELOW FOR PHYSICAL EXAMINATION, REVIEW OF SYSTEMS, AND OTHER HISTORY.    Camila Calderon MD  Colon and Rectal Surgery Staff  [facility]    -------------------------------------------------------------------------------------------------------------------    Medical history:  Past Medical History:   Diagnosis Date     Chronic ischemic heart disease 9/4/2014    Overview:  3 stents to RCA 8/2014      Essential hypertension 2/11/2005    Overview:  LW Onset:  [id number]      Fistula      Hyperlipidemia 11/14/2005    Overview:  LW Onset:  14Nov05      Perirectal abscess      Staph infection        Surgical history:  Past Surgical History:   Procedure Laterality Date     BIOPSY       CARDIAC SURGERY       COLONOSCOPY N/A 12/30/2015    Procedure: COLONOSCOPY;  Surgeon: Duane, William Charles, MD;  Location: [facility]     COLONOSCOPY WITH CO2 INSUFFLATION N/A 12/30/2015    Procedure: COLONOSCOPY WITH CO2 INSUFFLATION;  Surgeon: Duane, William Charles, MD;  Location: [facility]     EXAM UNDER ANESTHESIA ANUS N/A 1/10/2020    Procedure: EXAM UNDER ANESTHESIA, ANUS, inscision and drainage of perirectal abscess, partial fistulotomy,  seton placement;  Surgeon: Camila Calderon MD;  [facility]     EXAM UNDER ANESTHESIA ANUS N/A 6/19/2020    Procedure: Exam Under Anesthesia, Fistulotomy, Seton placement;  Surgeon: Camila Calderon MD;  [facility]     FISTULOTOMY RECTUM N/A 1/10/2020    Procedure: partial FISTULOTOMY, RECTUM;  Surgeon: Camila Calderon MD;  [facility]     PLACEMENT OF SETON RECTUM N/A 1/10/2020    Procedure: PLACEMENT, SETON STITCH;  Surgeon: Camila Calderon MD;  [facility]       Problem list:    Patient Active Problem List    Diagnosis Date Noted     Staph infection 03/03/2020     Priority: Medium     History of nonmelanoma skin cancer 03/03/2020      Priority: Medium     History of dysplastic nevus 03/03/2020     Priority: Medium     Perianal abscess 01/09/2020     Priority: Medium     Added automatically from request for surgery [id number]       Anal fistula 01/09/2020     Priority: Medium     Added automatically from request for surgery [id number]       Verruca plantaris 04/11/2018     Priority: Medium     LUQ abdominal pain 03/27/2018     Priority: Medium     CAD (coronary artery disease) 08/05/2015     Priority: Medium     Status post coronary angiogram 03/26/2015     Priority: Medium     Nondependent alcohol abuse 02/03/2015     Priority: Medium     Chronic ischemic heart disease 09/04/2014     Priority: Medium     Overview:   3 stents to RCA 8/2014       Postsurgical percutaneous transluminal coronary angioplasty status 09/04/2014     Priority: Medium     Benign neoplasm of colon 06/05/2013     Priority: Medium     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 Provider to review and confirm.       HTN, goal below 140/90 02/11/2005     Priority: Medium     Overview:   LW Onset:  [id number]       Obesity 02/11/2005     Priority: Medium     Overview:   LW Onset:  11Feb05       Allergic rhinitis 02/11/2005     Priority: Medium     Overview:   LW Onset:  11Feb05         Medications:  Current Outpatient Medications   Medication Sig Dispense Refill     aspirin (CHILDRENS ASPIRIN) 81 MG chewable tablet Take 81 mg by mouth       atenolol (TENORMIN) 25 MG tablet Take 1 tablet (25 mg) by mouth daily 90 tablet 3     ezetimibe (ZETIA) 10 MG tablet Take 1 tablet (10 mg) by mouth daily 90 tablet 2     fexofenadine (ALLEGRA) 60 MG tablet Take 0.5 tablets (30 mg) by mouth daily 60 tablet 3     mupirocin (BACTROBAN) 2 % external ointment Use 3 times per day for 3 weeks. Then the first week of every month for 6  months. 60 g 2     rosuvastatin (CRESTOR) 40 MG tablet Take 1 tablet (40 mg) by mouth daily 90 tablet 3     sildenafil (VIAGRA) 50 MG tablet Take 1 tablet (50 mg) by mouth daily as needed 10 tablet 0       Allergies:  No Known Allergies    Family history:  Family History   Problem Relation Age of Onset     Coronary Artery Disease Mother      Hypertension Mother      Cerebrovascular Disease Mother        Social history:  Social History     Socioeconomic History     Marital status:      Spouse name: Not on file     Number of children: Not on file     Years of education: Not on file     Highest education level: Not on file   Occupational History     Not on file   Social Needs     Financial resource strain: Not on file     Food insecurity     Worry: Not on file     Inability: Not on file     Transportation needs     Medical: Not on file     Non-medical: Not on file   Tobacco Use     Smoking status: Never Smoker     Smokeless tobacco: Never Used   Substance and Sexual Activity     Alcohol use: Yes     Drug use: No     Sexual activity: Yes     Partners: Female   Lifestyle     Physical activity     Days per week: Not on file     Minutes per session: Not on file     Stress: Not on file   Relationships     Social connections     Talks on phone: Not on file     Gets together: Not on file     Attends [employer] service: Not on file     Active member of club or organization: Not on file     Attends meetings of clubs or organizations: Not on file     Relationship status: Not on file     Intimate partner violence     Fear of current or ex partner: Not on file     Emotionally abused: Not on file     Physically abused: Not on file     Forced sexual activity: Not on file   Other Topics Concern     Parent/sibling w/ CABG, MI or angioplasty before 65F 55M? Not Asked   Social History Narrative     Not on file         Nursing Notes:   Reina Roberts CMA  12/23/2020  3:58 PM  Signed  Chief Complaint   Patient presents  with     Follow Up     Fistula follow up.       Vitals:    12/23/20 1539   BP: 134/78   BP Location: Left arm   Patient Position: Sitting   Cuff Size: Adult Regular   Pulse: 60   Temp: 98.6  F (37  C)   TempSrc: Oral   SpO2: 95%   Weight: 203 lb 9.6 oz   Height: 6'       Body mass index is 27.61 kg/m .          Reina Malik Banks CMA         Physical Examination:  /78 (BP Location: Left arm, Patient Position: Sitting, Cuff Size: Adult Regular)   Pulse 60   Temp 98.6  F (37  C) (Oral)   Ht 1.829 m (6')   Wt 92.4 kg (203 lb 9.6 oz)   SpO2 95%   BMI 27.61 kg/m    General: alert, oriented, in no acute distress, sitting comfortably  Perianal external examination:  1 yellow vessel loops seton in place in the right posterior position with external site healed up to the seton. Minimal drainage and no induration or fluctuance.   Digital rectal examination: Demonstrated the seton internally encircling approximately half of the internal anal sphincter.  Anoscopy: Confirmed the seton encircling approximately half of the internal anal sphincter with proximal scarring.

## 2020-12-23 ENCOUNTER — OFFICE VISIT (OUTPATIENT)
Dept: SURGERY | Facility: CLINIC | Age: 60
End: 2020-12-23
Payer: COMMERCIAL

## 2020-12-23 VITALS
DIASTOLIC BLOOD PRESSURE: 78 MMHG | BODY MASS INDEX: 27.58 KG/M2 | HEART RATE: 60 BPM | TEMPERATURE: 98.6 F | OXYGEN SATURATION: 95 % | WEIGHT: 203.6 LBS | SYSTOLIC BLOOD PRESSURE: 134 MMHG | HEIGHT: 72 IN

## 2020-12-23 DIAGNOSIS — K60.30 ANAL FISTULA: Primary | ICD-10-CM

## 2020-12-23 PROCEDURE — 99213 OFFICE O/P EST LOW 20 MIN: CPT | Performed by: COLON & RECTAL SURGERY

## 2020-12-23 ASSESSMENT — MIFFLIN-ST. JEOR: SCORE: 1781.52

## 2020-12-23 ASSESSMENT — PAIN SCALES - GENERAL: PAINLEVEL: NO PAIN (0)

## 2020-12-23 NOTE — LETTER
2020       RE: Tremayne Galarza  [address] [address]     Dear Colleague,    Thank you for referring your patient, Tremayne Galarza, to the [facility] COLON AND RECTAL SURGERY [facility]IN[facility] [place] [facility]. Please see a copy of my visit note below.    Colon and Rectal Surgery Clinic Note     Referring provider:  Referred Self, MD  No address on file     RE: Tremayne Galarza  : 1960  STEFANIA: 2020    Tremayne Galarza is a very pleasant 60 year old male here for follow-up of anorectal fistula.     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     He developed two small boils on his buttock and hip with cultures showing staphylococcus aureus and he was treated with Augmentin.    MRI Pelvis 20:  1. 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  2. Near complete resolution of the previously seen posterior perianal  abscess, with minimal residual dilation of the superior fistula tract.  3. Skin and subcutaneous soft tissue thickening and enhancement in the  anterior left perineal region corresponding to the clinically visible  lesion.    Colonoscopy in  was normal.    He saw dermatology and was treated for folliculitis     Tremayne has had a large posterior fistula that is intersphincteric with a large internal orifice. This has gotten worse despite a seton. An Magnetic Resonance Imaging (MRI) 20 showed complexity and a horseshoe component to the right.    He is now status post examination under anesthesia with partial fistulotomy, debridement of the fistula and placement of a seton on 20 with a large internal orifice, extension to the deep post-anal space and horseshoeing to the right    I saw him in clinic on 20 and wanted to give the internal cavity some more time to heal  before removing one of the setons and to get an MRI to ensure there was no branching and internal orificehas improved.    MR Pelvis 9/9/20:  Redemonstration of the posterior midline intersphincteric  fistula with seton in place which again bifurcates into superior and  inferior component. Decreased enhancement and T2 hyperintensity of the  primary tract since prior, improving but persistent active  inflammation. No new fistula formation.    I met with Tremayne in September and removed one of his setons at that time.    MR Pelvis 10/15/20:  Impression:   1. Redemonstration of posterior midline intersphincteric fistula with  seton in place, showing ramifications and slightly decreased  horseshoeing component on the right side with persistent inflammatory  change.  2. No new fistula or abscess.    MR Pelvis 12/9/20:  Impression:   1. Redemonstration of posterior midline intersphincteric fistula with  seton in place with minimally decreased horseshoeing component on the  right side with persistent inflammatory changes.  2. No new fistula or abscess is identified.    Interval history: Overall, he is doing well with minimal drainage and functioning well at work.  He has been over a year since this began.  The seton remains in place.    PLEASE SEE NOTE BELOW FOR PHYSICAL EXAMINATION, REVIEW OF SYSTEMS, AND OTHER HISTORY.    Assessment/Plan:  60 year old male with anorectal fistula with large internal orifice and extension to the deep post-anal space and horseshoeing to the right.  Overall he is significantly improved with minimal drainage at this point in a MRI that is improved.  We reviewed the findings of it which still do show a small amount of additional horseshoeing on the right.  On examination today, the internal orifice appears to be more proximal only involving about half of the internal anal sphincter.  I encouraged Tremayne to start tugging on the seton.  We will have the option of potentially doing a completion  fistulotomy versus a stage procedure.  I would like to do this in approximately 1 month.  We will schedule him for a examination under anesthesia with possible completion fistulotomy versus partial fistulotomy.  He will need to be seen in [facility] (PAC).  Answer multiple of his questions.    Total time was 15 minutes, over 50% was spent counseling the patient.     PLEASE SEE NOTE BELOW FOR PHYSICAL EXAMINATION, REVIEW OF SYSTEMS, AND OTHER HISTORY.    Camila Calderon MD  Colon and Rectal Surgery Staff  [facility]    -------------------------------------------------------------------------------------------------------------------    Medical history:  Past Medical History:   Diagnosis Date     Chronic ischemic heart disease 9/4/2014    Overview:  3 stents to RCA 8/2014      Essential hypertension 2/11/2005    Overview:  LW Onset:  [id number]      Fistula      Hyperlipidemia 11/14/2005    Overview:  LW Onset:  [id number]      Perirectal abscess      Staph infection        Surgical history:  Past Surgical History:   Procedure Laterality Date     BIOPSY       CARDIAC SURGERY       COLONOSCOPY N/A 12/30/2015    Procedure: COLONOSCOPY;  Surgeon: Duane, William Charles, MD;  Location: [facility]     COLONOSCOPY WITH CO2 INSUFFLATION N/A 12/30/2015    Procedure: COLONOSCOPY WITH CO2 INSUFFLATION;  Surgeon: Duane, William Charles, MD;  Location: [facility]     EXAM UNDER ANESTHESIA ANUS N/A 1/10/2020    Procedure: EXAM UNDER ANESTHESIA, ANUS, inscision and drainage of perirectal abscess, partial fistulotomy,  seton placement;  Surgeon: Camila Calderon MD;  [facility]     EXAM UNDER ANESTHESIA ANUS N/A 6/19/2020    Procedure: Exam Under Anesthesia, Fistulotomy, Seton placement;  Surgeon: Camila Calderon MD;  [facility]     EXAM UNDER ANESTHESIA ANUS N/A 1/15/2021    Procedure: EXAM UNDER ANESTHESIA, ANUS, Completion fistulotomy;  Surgeon: Yumiko,  Camila REYNOSO MD;  [facility]     FISTULOTOMY RECTUM N/A 1/10/2020    Procedure: partial FISTULOTOMY, RECTUM;  Surgeon: Camila Calderon MD;  [facility]     PLACEMENT OF SETON RECTUM N/A 1/10/2020    Procedure: PLACEMENT, SETON STITCH;  Surgeon: Camila Calderon MD;  [facility]       Problem list:    Patient Active Problem List    Diagnosis Date Noted     Staph infection 03/03/2020     Priority: Medium     History of nonmelanoma skin cancer 03/03/2020     Priority: Medium     History of dysplastic nevus 03/03/2020     Priority: Medium     Perianal abscess 01/09/2020     Priority: Medium     Added automatically from request for surgery [id number]       Anal fistula 01/09/2020     Priority: Medium     Added automatically from request for surgery [id number]       Verruca plantaris 04/11/2018     Priority: Medium     LUQ abdominal pain 03/27/2018     Priority: Medium     CAD (coronary artery disease) 08/05/2015     Priority: Medium     Status post coronary angiogram 03/26/2015     Priority: Medium     Nondependent alcohol abuse 02/03/2015     Priority: Medium     Chronic ischemic heart disease 09/04/2014     Priority: Medium     Overview:   3 stents to RCA 8/2014       Postsurgical percutaneous transluminal coronary angioplasty status 09/04/2014     Priority: Medium     Benign neoplasm of colon 06/05/2013     Priority: Medium     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 Provider to review and confirm.       HTN, goal below 140/90 02/11/2005     Priority: Medium     Overview:   LW Onset:  87Exr79       Obesity 02/11/2005     Priority: Medium     Overview:   LW Onset:  27Rdx76       Allergic rhinitis 02/11/2005     Priority: Medium     Overview:   LW Onset:  11Feb05         Medications:  Current Outpatient Medications    Medication Sig Dispense Refill     aspirin (CHILDRENS ASPIRIN) 81 MG chewable tablet Take 81 mg by mouth       atenolol (TENORMIN) 25 MG tablet Take 1 tablet (25 mg) by mouth daily 90 tablet 3     ezetimibe (ZETIA) 10 MG tablet Take 1 tablet (10 mg) by mouth daily 90 tablet 2     fexofenadine (ALLEGRA) 60 MG tablet Take 0.5 tablets (30 mg) by mouth daily 60 tablet 3     mupirocin (BACTROBAN) 2 % external ointment Use 3 times per day for 3 weeks. Then the first week of every month for 6 months. 60 g 2     rosuvastatin (CRESTOR) 40 MG tablet Take 1 tablet (40 mg) by mouth daily 90 tablet 3     sildenafil (VIAGRA) 50 MG tablet Take 1 tablet (50 mg) by mouth daily as needed 10 tablet 0       Allergies:  No Known Allergies    Family history:  Family History   Problem Relation Age of Onset     Coronary Artery Disease Mother      Hypertension Mother      Cerebrovascular Disease Mother        Social history:  Social History     Socioeconomic History     Marital status:      Spouse name: Not on file     Number of children: Not on file     Years of education: Not on file     Highest education level: Not on file   Occupational History     Not on file   Social Needs     Financial resource strain: Not on file     Food insecurity     Worry: Not on file     Inability: Not on file     Transportation needs     Medical: Not on file     Non-medical: Not on file   Tobacco Use     Smoking status: Never Smoker     Smokeless tobacco: Never Used   Substance and Sexual Activity     Alcohol use: Yes     Comment: 2-3 drinks weekly     Drug use: No     Sexual activity: Yes     Partners: Female   Lifestyle     Physical activity     Days per week: Not on file     Minutes per session: Not on file     Stress: Not on file   Relationships     Social connections     Talks on phone: Not on file     Gets together: Not on file     Attends Nondenominational service: Not on file     Active member of club or organization: Not on file     Attends meetings  of clubs or organizations: Not on file     Relationship status: Not on file     Intimate partner violence     Fear of current or ex partner: Not on file     Emotionally abused: Not on file     Physically abused: Not on file     Forced sexual activity: Not on file   Other Topics Concern     Parent/sibling w/ CABG, MI or angioplasty before 65F 55M? Not Asked   Social History Narrative     Not on file         Nursing Notes:   Reina Roberts CMA  12/23/2020  3:58 PM  Signed  Chief Complaint   Patient presents with     Follow Up     Fistula follow up.       Vitals:    12/23/20 1539   BP: 134/78   BP Location: Left arm   Patient Position: Sitting   Cuff Size: Adult Regular   Pulse: 60   Temp: 98.6  F (37  C)   TempSrc: Oral   SpO2: 95%   Weight: 203 lb 9.6 oz   Height: 6'       Body mass index is 27.61 kg/m .        Reina Banks CMA       Physical Examination:  /78 (BP Location: Left arm, Patient Position: Sitting, Cuff Size: Adult Regular)   Pulse 60   Temp 98.6  F (37  C) (Oral)   Ht 6'   Wt 203 lb 9.6 oz   SpO2 95%   BMI 27.61 kg/m    General: alert, oriented, in no acute distress, sitting comfortably  Perianal external examination:  1 yellow vessel loops seton in place in the right posterior position with external site healed up to the seton. Minimal drainage and no induration or fluctuance.   Digital rectal examination: Demonstrated the seton internally encircling approximately half of the internal anal sphincter.  Anoscopy: Confirmed the seton encircling approximately half of the internal anal sphincter with proximal scarring.    Again, thank you for allowing me to participate in the care of your patient.      Sincerely,    Camila Calderon MD

## 2020-12-23 NOTE — NURSING NOTE
Chief Complaint   Patient presents with     Follow Up     Fistula follow up.       Vitals:    12/23/20 1539   BP: 134/78   BP Location: Left arm   Patient Position: Sitting   Cuff Size: Adult Regular   Pulse: 60   Temp: 98.6  F (37  C)   TempSrc: Oral   SpO2: 95%   Weight: 203 lb 9.6 oz   Height: 6'       Body mass index is 27.61 kg/m .          Reina Banks CMA

## 2020-12-28 DIAGNOSIS — K60.30 ANAL FISTULA: Primary | ICD-10-CM

## 2021-01-11 ENCOUNTER — TELEPHONE (OUTPATIENT)
Dept: SURGERY | Facility: CLINIC | Age: 61
End: 2021-01-11

## 2021-01-11 ENCOUNTER — PRE VISIT (OUTPATIENT)
Dept: SURGERY | Facility: CLINIC | Age: 61
End: 2021-01-11

## 2021-01-11 DIAGNOSIS — Z11.59 ENCOUNTER FOR SCREENING FOR OTHER VIRAL DISEASES: Primary | ICD-10-CM

## 2021-01-11 NOTE — TELEPHONE ENCOUNTER
Spoke with patient, he would like his EUA procedure with Dr. Calderon scheduled asap. I was able to schedule it this Friday 1/15/2021.    Completed the following scheduling with patient via phone:    Patient is scheduled for surgery with Dr. Calderon    Spoke with Tremayne    Date of Surgery: 1/15/2021    Location: [facility]    Informed patient they will need an adult  Yes    Pre-op with surgeon (if applicable): 12/23/2020    H&P: Scheduled with PAC on 1/12/2021 at 4:00 PM    COVID-19 Test scheduled at the [facility] on 1/12/2021 at 5:30 PM    Post-op appointments scheduled with:    - Juliette Giles NP, on 2/1/2021 at 3:00 PM    - Dr. Calderon on 3/3/2021 at 5:15 PM    Surgery packet: sent via [employer] and Mail on 1/11/2021

## 2021-01-11 NOTE — TELEPHONE ENCOUNTER
FUTURE VISIT INFORMATION      SURGERY INFORMATION:    Date: 1/15/21    Location:  OR    Surgeon:  Camila Calderon MD    Anesthesia Type:  MAC    Procedure: EXAM UNDER ANESTHESIA, ANUS    Consult:     RECORDS REQUESTED FROM:       Primary Care Provider: [place] APRN [facility]    Pertinent Medical History: Chronic ischemic heart disease, hypertension, CAD    Most recent EKG+ Tracin20    Most recent ECHO: 14    Most recent Cardiac Stress Test:    Most recent Coronary Angiogram: 3/26/15

## 2021-01-12 ENCOUNTER — ANESTHESIA EVENT (OUTPATIENT)
Dept: SURGERY | Facility: AMBULATORY SURGERY CENTER | Age: 61
End: 2021-01-12

## 2021-01-12 ENCOUNTER — OFFICE VISIT (OUTPATIENT)
Dept: SURGERY | Facility: CLINIC | Age: 61
End: 2021-01-12
Payer: COMMERCIAL

## 2021-01-12 VITALS
HEIGHT: 72 IN | HEART RATE: 55 BPM | BODY MASS INDEX: 27.85 KG/M2 | WEIGHT: 205.6 LBS | TEMPERATURE: 97.8 F | RESPIRATION RATE: 16 BRPM | OXYGEN SATURATION: 99 % | SYSTOLIC BLOOD PRESSURE: 163 MMHG | DIASTOLIC BLOOD PRESSURE: 85 MMHG

## 2021-01-12 DIAGNOSIS — Z01.818 PREOP EXAMINATION: Primary | ICD-10-CM

## 2021-01-12 DIAGNOSIS — Z11.59 ENCOUNTER FOR SCREENING FOR OTHER VIRAL DISEASES: ICD-10-CM

## 2021-01-12 LAB
SARS-COV-2 RNA RESP QL NAA+PROBE: NORMAL
SPECIMEN SOURCE: NORMAL

## 2021-01-12 PROCEDURE — U0005 INFEC AGEN DETEC AMPLI PROBE: HCPCS | Mod: 90 | Performed by: PATHOLOGY

## 2021-01-12 PROCEDURE — 99203 OFFICE O/P NEW LOW 30 MIN: CPT | Performed by: CLINICAL NURSE SPECIALIST

## 2021-01-12 PROCEDURE — U0003 INFECTIOUS AGENT DETECTION BY NUCLEIC ACID (DNA OR RNA); SEVERE ACUTE RESPIRATORY SYNDROME CORONAVIRUS 2 (SARS-COV-2) (CORONAVIRUS DISEASE [COVID-19]), AMPLIFIED PROBE TECHNIQUE, MAKING USE OF HIGH THROUGHPUT TECHNOLOGIES AS DESCRIBED BY CMS-2020-01-R: HCPCS | Mod: 90 | Performed by: PATHOLOGY

## 2021-01-12 ASSESSMENT — PAIN SCALES - GENERAL: PAINLEVEL: NO PAIN (0)

## 2021-01-12 ASSESSMENT — MIFFLIN-ST. JEOR: SCORE: 1790.6

## 2021-01-12 ASSESSMENT — LIFESTYLE VARIABLES: TOBACCO_USE: 0

## 2021-01-12 NOTE — PATIENT INSTRUCTIONS
Preparing for Your Surgery      Name:  Tremayne Galarza   MRN:  [id number]   :  1960   Today's Date:  2021       Arriving for surgery:  Surgery date:  1/15/21  Arrival time:  06:20 am    Restrictions due to COVID 19:  No visitors are allowed at this time.    [place] parking is available for anyone with mobility limitations or disabilities.  ([place]  24 hours/ 7 days a week; [facility]  7 am- 3:30 pm, Mon- Fri)    Please come to:   [facility]  [address] [place] [id number]  -  Proceed to the 5th floor to check into the Ambulatory Surgery Center.              >> There will be patient concierges on the 1st and 5th floor, for assistance or an escort, if you would like.              >> Please call [phone] with any questions.    What can I eat or drink?  -  You may eat and drink normally for up to 8 hours before your surgery.   -  You may have clear liquids until 4 hours before surgery.     Examples of clear liquids:  Water  Clear broth  Juices (apple, white grape, white cranberry  and cider) without pulp  Noncarbonated, powder based beverages  (lemonade and Rahul-Aid)  Sodas (Sprite, 7-Up, ginger ale and seltzer)  Coffee or tea (without milk or cream)  Gatorade    -  No Alcohol for at least 24 hours before surgery     Which medicines can I take?    Hold Aspirin for 7 days before surgery.   Hold Multivitamins for 7 days before surgery.  Hold Supplements for 7 days before surgery.  Hold Ibuprofen (Advil, Motrin) for 1 day before surgery--unless otherwise directed by surgeon.  Hold Naproxen (Aleve) for 4 days before surgery.  -  PLEASE TAKE these medications the day of surgery:  Tylenol if needed; take scheduled morning medications.    How do I prepare myself?  - Please take 2 showers before surgery using Scrubcare or Hibiclens soap.    Use this soap only from the neck to your toes.     Leave the soap on your skin for one minute--then rinse thoroughly.      You may use  your own shampoo and conditioner; no other hair products.   - Please remove all jewelry and body piercings.  - No lotions, deodorants or fragrance.  - No makeup or fingernail polish.   - Bring your ID and insurance card.    - All patients are required to have a Covid-19 test within 4 days of surgery/procedure.      -Patients will be contacted by the [facility] scheduling team within 1 week of surgery to make an appointment.      - Patients may call the Scheduling team at [phone] if they have not been scheduled within 4 days of  surgery.      ALL PATIENTS GOING HOME THE SAME DAY OF SURGERY ARE REQUIRED TO HAVE A RESPONSIBLE ADULT TO DRIVE AND BE IN ATTENDANCE WITH THEM FOR 24 HOURS FOLLOWING SURGERY     Questions or Concerns:    - For any questions regarding the day of surgery or your hospital stay, please contact the Pre Admission Nursing Office at [phone].       - If you have health changes between today and your surgery please call your surgeon.       For questions after surgery please call your surgeons office.

## 2021-01-12 NOTE — ANESTHESIA PREPROCEDURE EVALUATION
Anesthesia Pre-Procedure Evaluation    Patient: Tremayne Galarza   MRN:     [id number] Gender:   male   Age:    60 year old :      1960        Preoperative Diagnosis: Anal fistula [K60.3]   Procedure(s):  EXAM UNDER ANESTHESIA, ANUS     LABS:  CBC:   Lab Results   Component Value Date    WBC 4.9 2020    WBC 8.6 2019    HGB 14.6 2020    HGB 14.7 2019    HCT 42.5 2020    HCT 44.0 2019     (L) 2020     2019     BMP:   Lab Results   Component Value Date     2020     2019    POTASSIUM 4.3 2020    POTASSIUM 3.9 2019    CHLORIDE 107 2020    CHLORIDE 106 2019    CO2 29 2020    CO2 30 2019    BUN 17 2020    BUN 20 2019    CR 0.80 2020    CR 0.93 2019    GLC 96 2020     (H) 2019     COAGS:   Lab Results   Component Value Date    INR 1.0 2014     POC: No results found for: BGM, HCG, HCGS  OTHER:   Lab Results   Component Value Date    ARMAND 9.1 2020    ALBUMIN 4.0 2019    PROTTOTAL 7.8 2019    ALT 36 2019    AST 25 2019    ALKPHOS 64 2019    BILITOTAL 0.6 2019    LIPASE 236 2018    TSH 1.78 2015        Preop Vitals    BP Readings from Last 3 Encounters:   20 134/78   20 118/76   20 (!) 140/89    Pulse Readings from Last 3 Encounters:   20 60   20 75   20 51      Resp Readings from Last 3 Encounters:   20 14   01/10/20 12   12/30/15 16    SpO2 Readings from Last 3 Encounters:   20 95%   20 94%   20 97%      Temp Readings from Last 1 Encounters:   20 98.6  F (37  C) (Oral)    Ht Readings from Last 1 Encounters:   20 1.829 m (6')      Wt Readings from Last 1 Encounters:   20 92.4 kg (203 lb 9.6 oz)    Estimated body mass index is 27.61 kg/m  as calculated from the following:    Height as of 20: 1.829 m (6').    Weight as of  12/23/20: 92.4 kg (203 lb 9.6 oz).     LDA:  Right Radial Interventional Cardiac Procedure Access (Active)   Number of days: 2119       Open Drain Rectal  (Active)   Number of days: 368        Past Medical History:   Diagnosis Date     Chronic ischemic heart disease 9/4/2014    Overview:  3 stents to RCA 8/2014      Essential hypertension 2/11/2005    Overview:  LW Onset:  [id number]      Fistula      Hyperlipidemia 11/14/2005    Overview:  LW Onset:  [id number]      Perirectal abscess      Staph infection       Past Surgical History:   Procedure Laterality Date     BIOPSY       CARDIAC SURGERY       COLONOSCOPY N/A 12/30/2015    Procedure: COLONOSCOPY;  Surgeon: Duane, William Charles, MD;  Location: [facility] OR     COLONOSCOPY WITH CO2 INSUFFLATION N/A 12/30/2015    Procedure: COLONOSCOPY WITH CO2 INSUFFLATION;  Surgeon: Duane, William Charles, MD;  Location: [facility] OR     EXAM UNDER ANESTHESIA ANUS N/A 1/10/2020    Procedure: EXAM UNDER ANESTHESIA, ANUS, inscision and drainage of perirectal abscess, partial fistulotomy,  seton placement;  Surgeon: Camila Calderon MD;  Location: [facility] OR     EXAM UNDER ANESTHESIA ANUS N/A 6/19/2020    Procedure: Exam Under Anesthesia, Fistulotomy, Seton placement;  Surgeon: Camila Calderon MD;  Location: [facility] OR     FISTULOTOMY RECTUM N/A 1/10/2020    Procedure: partial FISTULOTOMY, RECTUM;  Surgeon: Camila Calderon MD;  Location: [facility] OR     PLACEMENT OF SETON RECTUM N/A 1/10/2020    Procedure: PLACEMENT, SETON STITCH;  Surgeon: Camila Calderon MD;  Location: [facility] OR      No Known Allergies     Anesthesia Evaluation     . Pt has had prior anesthetic. Type: General and MAC    No history of anesthetic complications          ROS/MED HX    ENT/Pulmonary:  - neg pulmonary ROS    (-) tobacco use   Neurologic:  - neg neurologic ROS     Cardiovascular:     (+) Dyslipidemia, hypertension--CAD, --stent,3 stents to RCA 8/2014  . Taking blood thinners Pt has received  "instructions: Instructions Given to patient: Planned 7 day hold for surgery. . . :. . Previous cardiac testing date:results:Stress Testdate: results:ECG reviewed date:6/11/20 results:\"Sinus rhythm, within normal limits\"Cath date: 3/2015 results:          METS/Exercise Tolerance:  >4 METS   Hematologic:  - neg hematologic  ROS       Musculoskeletal:  - neg musculoskeletal ROS       GI/Hepatic: Comment: Complex anal fistula    (+) Other GI/Hepatic Perianal abscess      Renal/Genitourinary:  - ROS Renal section negative       Endo:     (+) Obesity, .      Psychiatric:  - neg psychiatric ROS       Infectious Disease:  - neg infectious disease ROS       Malignancy:      - no malignancy   Other:    (+) No chance of pregnancy C-spine cleared: N/A, no H/O Chronic Pain,no other significant disability   - neg other ROS                     PHYSICAL EXAM:   Mental Status/Neuro: A/A/O; Age Appropriate   Airway: Facies: Feasible  Mallampati: I  Mouth/Opening: Full  TM distance: > 6 cm  Neck ROM: Full   Respiratory: Auscultation: CTAB     Resp. Rate: Normal     Resp. Effort: Normal      CV: Rhythm: Regular  Heart: Normal Sounds   Comments:                      Assessment:   ASA SCORE: 3    H&P: History and physical reviewed and following examination; no interval change.    NPO Status: NPO Appropriate     Plan:   Anes. Type:  MAC   Pre-Medication: None   Induction:  N/a   Airway: Native Airway   Access/Monitoring: PIV   Maintenance: N/a     Postop Plan:   Postop Pain: None  Postop Sedation/Airway: Not planned  Disposition: Outpatient     PONV Management:    Prevention: Ondansetron, Dexamethasone     CONSENT: Direct conversation   Plan and risks discussed with: Patient                   PAC Discussion and Assessment    ASA Classification: 3  Case is suitable for: ASC  Anesthetic techniques and relevant risks discussed: MAC with GA as backup  Invasive monitoring and risk discussed: No  Types:   Possibility and Risk of blood " transfusion discussed: No  NPO instructions given:   Additional anesthetic preparation and risks discussed:   Needs early admission to pre-op area:   Other:     PAC Resident/NP Anesthesia Assessment:  Tremayne Galarza is a 60 year old male scheduled to undergo examination under anesthesia with Dr. Sai Stewart on 1/15/21. He has the following specific operative considerations:   - RCRI : No serious cardiac risks.   - VTE risk: 0.5%  - DANIEL # of risks 3/8 = Intermediate risk  - Risk of PONV score = 2.  If > 2, anti-emetic intervention recommended.    --Complex anal fistula s/p multiple procedures. Above procedure now planned for further evaluation and possible definitive surgery.  --No history of problems with anesthesia.  --HLD Zetia, rosuvastatin. HYPERTENSION. Will take atenolol on DOS. Cardiac history as above with stents X 3 in 2014. Is followed by Cardiology and stable. No aspirin at this time. Denies chest pain or irregular HR. Able to do kickboxing without symptoms.   --Nonsmoker. Denies pulmonary symptoms.       Patient is optimized and is acceptable candidate for the proposed procedure.  No further diagnostic evaluation is needed.         Reviewed and Signed by PAC Mid-Level Provider/Resident  Mid-Level Provider/Resident: SYLVIA Roberson, CNS  Date: 1/12/21  Time: 4:11pm    Attending Anesthesiologist Anesthesia Assessment:        Anesthesiologist:   Date:   Time:   Pass/Fail:   Disposition:     PAC Pharmacist Assessment:        Pharmacist:   Date:   Time:    SYLVIA Stanton

## 2021-01-12 NOTE — H&P
Pre-Operative H & P     CC:  Preoperative exam to assess for increased cardiopulmonary risk while undergoing surgery and anesthesia.    Date of Encounter: 1/12/2021  Primary Care Physician:  Mee Park  Reason for visit: Anal fistula    HPI  Tremayne Galarza is a 60 year old male who presents for pre-operative H & P in preparation for exam under anesthesia of anus with Dr. Calderon on 1/15/21 at [facility]. History is obtained from the patient and medical records.     Patient with history of perirectal abscess s/p multiple procedures, and findings of intersphincteric fistula, last 6/20/20 with EUA, partial fistulotomy, debridement of fistula and placement of seton. He has been followed in clinic over time by Colon and Rectal surgery with some improvement in his last exam. His last MR pelvis on 12/9/20 showed redemonstration of posterior midline intersphincteric fistula with seton in place with minimally decreased horseshoeing component on the right side with persistent inflammatory changes. There was no new fistula or abscess identified. He was counseled for above procedure for further evaluation and possibly more definitive surgery.     His history is otherwise significant for HLD, HYPERTENSION, CAD, s/p  PCI of the proximal, mid/distal RCA segments with JOSE.stents X 3 to RCA in 2014. He is followed by Dr. West, last seen on 4/6/20 and considered stable with some medication adjustments for his lipid lowering agents.     Today patient denies fever, cough, shortness of breath, chest pain, irregular HR, or ankle edema. He is able to walk on his treadmill at home and did a kick boxing class this am without difficulty.     Past Medical History  Past Medical History:   Diagnosis Date     Chronic ischemic heart disease 9/4/2014    Overview:  3 stents to RCA 8/2014      Essential hypertension 2/11/2005    Overview:  LW Onset:  [id number]      Fistula      Hyperlipidemia 11/14/2005     Overview:  LW Onset:  14Nov05      Perirectal abscess      Staph infection        Past Surgical History  Past Surgical History:   Procedure Laterality Date     BIOPSY       CARDIAC SURGERY       COLONOSCOPY N/A 12/30/2015    Procedure: COLONOSCOPY;  Surgeon: Duane, William Charles, MD;  Location: [facility] OR     COLONOSCOPY WITH CO2 INSUFFLATION N/A 12/30/2015    Procedure: COLONOSCOPY WITH CO2 INSUFFLATION;  Surgeon: Duane, William Charles, MD;  Location: [facility] OR     EXAM UNDER ANESTHESIA ANUS N/A 1/10/2020    Procedure: EXAM UNDER ANESTHESIA, ANUS, inscision and drainage of perirectal abscess, partial fistulotomy,  seton placement;  Surgeon: Camila Calderon MD;  Location: [facility] OR     EXAM UNDER ANESTHESIA ANUS N/A 6/19/2020    Procedure: Exam Under Anesthesia, Fistulotomy, Seton placement;  Surgeon: Camila Calderon MD;  Location: [facility] OR     FISTULOTOMY RECTUM N/A 1/10/2020    Procedure: partial FISTULOTOMY, RECTUM;  Surgeon: Camila Calderon MD;  Location: [facility] OR     PLACEMENT OF SETON RECTUM N/A 1/10/2020    Procedure: PLACEMENT, SETON STITCH;  Surgeon: Camila Calderon MD;  Location: [facility] OR       Hx of Blood transfusions/reactions: Denies.      Hx of abnormal bleeding or anti-platelet use: Denies.     Menstrual history: No LMP for male patient.    Steroid use in the last year: Unknown.     Personal or FH with difficulty with Anesthesia:  Denies.     Prior to Admission Medications  Current Outpatient Medications   Medication Sig Dispense Refill     aspirin (CHILDRENS ASPIRIN) 81 MG chewable tablet Take 81 mg by mouth       atenolol (TENORMIN) 25 MG tablet Take 1 tablet (25 mg) by mouth daily 90 tablet 3     ezetimibe (ZETIA) 10 MG tablet Take 1 tablet (10 mg) by mouth daily 90 tablet 2     fexofenadine (ALLEGRA) 60 MG tablet Take 0.5 tablets (30 mg) by mouth daily 60 tablet 3     rosuvastatin (CRESTOR) 40 MG tablet Take 1 tablet (40 mg) by mouth daily 90 tablet 3     mupirocin  (BACTROBAN) 2 % external ointment Use 3 times per day for 3 weeks. Then the first week of every month for 6 months. 60 g 2     sildenafil (VIAGRA) 50 MG tablet Take 1 tablet (50 mg) by mouth daily as needed 10 tablet 0       Allergies  No Known Allergies    Social History  Social History     Socioeconomic History     Marital status:      Spouse name: Not on file     Number of children: Not on file     Years of education: Not on file     Highest education level: Not on file   Occupational History     Not on file   Social Needs     Financial resource strain: Not on file     Food insecurity     Worry: Not on file     Inability: Not on file     Transportation needs     Medical: Not on file     Non-medical: Not on file   Tobacco Use     Smoking status: Never Smoker     Smokeless tobacco: Never Used   Substance and Sexual Activity     Alcohol use: Yes     Comment: 2-3 drinks weekly     Drug use: No     Sexual activity: Yes     Partners: Female   Lifestyle     Physical activity     Days per week: Not on file     Minutes per session: Not on file     Stress: Not on file   Relationships     Social connections     Talks on phone: Not on file     Gets together: Not on file     Attends [employer] service: Not on file     Active member of club or organization: Not on file     Attends meetings of clubs or organizations: Not on file     Relationship status: Not on file     Intimate partner violence     Fear of current or ex partner: Not on file     Emotionally abused: Not on file     Physically abused: Not on file     Forced sexual activity: Not on file   Other Topics Concern     Parent/sibling w/ CABG, MI or angioplasty before 65F 55M? Not Asked   Social History Narrative     Not on file       Family History  Family History   Problem Relation Age of Onset     Coronary Artery Disease Mother      Hypertension Mother      Cerebrovascular Disease Mother        Personally reviewed  LABS:  CBC:   Lab Results   Component Value Date     WBC 4.9 06/11/2020    WBC 8.6 12/23/2019    HGB 14.6 06/11/2020    HGB 14.7 12/23/2019    HCT 42.5 06/11/2020    HCT 44.0 12/23/2019     (L) 06/11/2020     12/23/2019     BMP:   Lab Results   Component Value Date     06/11/2020     12/23/2019    POTASSIUM 4.3 06/11/2020    POTASSIUM 3.9 12/23/2019    CHLORIDE 107 06/11/2020    CHLORIDE 106 12/23/2019    CO2 29 06/11/2020    CO2 30 12/23/2019    BUN 17 06/11/2020    BUN 20 12/23/2019    CR 0.80 06/11/2020    CR 0.93 12/23/2019    GLC 96 06/11/2020     (H) 12/23/2019     COAGS:   Lab Results   Component Value Date    INR 1.0 08/21/2014     POC: No results found for: BGM, HCG, HCGS  OTHER:   Lab Results   Component Value Date    ARMAND 9.1 06/11/2020    ALBUMIN 4.0 12/23/2019    PROTTOTAL 7.8 12/23/2019    ALT 36 12/23/2019    AST 25 12/23/2019    ALKPHOS 64 12/23/2019    BILITOTAL 0.6 12/23/2019    LIPASE 236 03/27/2018    TSH 1.78 02/06/2015        Preop Vitals    BP Readings from Last 3 Encounters:   12/23/20 134/78   09/16/20 118/76   08/05/20 (!) 140/89    Pulse Readings from Last 3 Encounters:   12/23/20 60   09/16/20 75   08/05/20 51      Resp Readings from Last 3 Encounters:   06/19/20 14   01/10/20 12   12/30/15 16    SpO2 Readings from Last 3 Encounters:   12/23/20 95%   09/16/20 94%   08/05/20 97%      Temp Readings from Last 1 Encounters:   12/23/20 98.6  F (37  C) (Oral)    Ht Readings from Last 1 Encounters:   12/23/20 1.829 m (6')      Wt Readings from Last 1 Encounters:   12/23/20 92.4 kg (203 lb 9.6 oz)    Estimated body mass index is 27.61 kg/m  as calculated from the following:    Height as of 12/23/20: 1.829 m (6').    Weight as of 12/23/20: 92.4 kg (203 lb 9.6 oz).     ROS/MED HX    The complete review of systems is negative other than noted in the HPI or here.     ENT/Pulmonary:  - neg pulmonary ROS    (-) tobacco use   Neurologic:  - neg neurologic ROS     Cardiovascular:     (+) Dyslipidemia, hypertension--CAD,  "--stent,3 stents to RCA 8/2014  . Taking blood thinners Pt has received instructions: Instructions Given to patient: Planned 7 day hold for surgery. . . :. . Previous cardiac testing date:results:Stress Testdate: results:ECG reviewed date:6/11/20 results:\"Sinus rhythm, within normal limits\"Cath date: 3/2015 results:          METS/Exercise Tolerance:  >4 METS   Hematologic:  - neg hematologic  ROS       Musculoskeletal:  - neg musculoskeletal ROS       GI/Hepatic: Comment: Complex anal fistula    (+) Other GI/Hepatic Perianal abscess      Renal/Genitourinary:  - ROS Renal section negative       Endo:     (+) Obesity, .      Psychiatric:  - neg psychiatric ROS       Infectious Disease:  - neg infectious disease ROS       Malignany:      - no malignancy   Other:    (+) No chance of pregnancy C-spine cleared: N/A, no H/O Chronic Pain,no other significant disability   - neg other ROS         PHYSICAL EXAM:   Mental Status/Neuro: A/A/O; Age Appropriate   Airway: Facies: Feasible  Mallampati: I  Mouth/Opening: Full  TM distance: > 6 cm  Neck ROM: Full   Respiratory: Auscultation: CTAB     Resp. Rate: Normal     Resp. Effort: Normal      CV: Rhythm: Regular  Heart: Normal Sounds   Comments:      Temp: 97.8  F (36.6  C) Temp src: Oral BP: (!) 163/85 Pulse: 55   Resp: 16 SpO2: 99 %         205 lbs 9.6 oz  6' 0\"   Body mass index is 27.88 kg/m .       Physical Exam  Constitutional: Awake, alert, cooperative, no apparent distress, and appears stated age.  Eyes: Pupils equal, round and reactive to light, extra ocular muscles intact, sclera clear, conjunctiva normal.  HENT: Normocephalic, oral pharynx with moist mucus membranes, good dentition. No goiter appreciated.   Respiratory: Clear to auscultation bilaterally, no crackles or wheezing. No cough or obvious dyspnea.  Cardiovascular: Regular rate and rhythm, normal S1 and S2, and no murmur noted.  Carotids +2, no bruits. No edema. Palpable pulses to radial  DP and PT arteries. "   GI: Normal bowel sounds, soft, non-distended, non-tender, no masses palpated, no hepatosplenomegaly.    Lymph/Hematologic: No cervical lymphadenopathy and no supraclavicular lymphadenopathy.  Genitourinary:  Deferred.   Skin: Warm and dry.  No rashes at anticipated surgical site.   Musculoskeletal: Full ROM of neck. There is no redness, warmth, or swelling of the joints. Gross motor strength is normal.    Neurologic: Awake, alert, oriented to name, place and time. Cranial nerves II-XII are grossly intact. Gait is normal.   Neuropsychiatric: Calm, cooperative. Normal affect.     EKG: Personally reviewed 6/11/20 Sinus rhythm  Stress test: 2014  CONCLUSIONS  REST:  LV chamber size, wall thickness, and segmental and global wall motion  are normal. No significant valvular abnormalities are seen.  The visually estimated resting LVEF is 60 %.    STRESS:  There is lack of augmentation of LVEF during peak exercise.  Anterior-apical hypokinesis is suggested.    CONCLUSIONS:  Abnormal exercise echocardiogram with adequate heart rate and  workload.  There is lack of augmentation of LVEF during peak exercise.  Anterior-apical hypokinesis is suggested.  Risk stratification by stress echocardiography is identified as  intermediate to high risk.  Cardiology consult will be scheduled.    CARDIAC CATH: 8/27/14  The coronary circulation is right dominant.  Left main coronary artery   Left main: Normal.  Left anterior descending artery and branches  LAD: Normal. Moderate calcification, nonobstructive disease.  Left circumflex artery and branches  Circumflex: Moderate calcification, nonobstructive disease.  Right coronary artery and branches  RCA: This vessel is subtotally occluded in the proximal portion. There is a long segment of disease in the mid/distal portion with JOE 2 flow. Both of these lesions were successfully stented with an everolimus eluting  stents. Proximal RCA: Mid RCA    MR Pelvis 12/9/20                                                                    Impression:   1. Redemonstration of posterior midline intersphincteric fistula with  seton in place with minimally decreased horseshoeing component on the  right side with persistent inflammatory changes.  2. No new fistula or abscess is identified.     Imaging and cardiac testing reviewed by this provider    Outside records reviewed from: Care Everywhere    ASSESSMENT and PLAN  Tremayne Galarza is a 60 year old male scheduled to undergo examination under anesthesia with Dr. Sai Stewart on 1/15/21. He has the following specific operative considerations:   - RCRI : No serious cardiac risks.   - Anesthesia considerations:  Refer to PAC assessment in anesthesia records  - VTE risk: 0.5%  - DANIEL # of risks 3/8 = Intermediate risk  - Risk of PONV score = 2.  If > 2, anti-emetic intervention recommended.    --Complex anal fistula s/p multiple procedures. Above procedure now planned for further evaluation and possible definitive surgery.  --No history of problems with anesthesia.  --HLD Zetia, rosuvastatin. HYPERTENSION. Will take atenolol on DOS. Cardiac history as above with stents X 3 in 2014. Is followed by Cardiology and stable. No aspirin at this time. Denies chest pain or irregular HR. Able to do kick boxing without symptoms.   --Nonsmoker. Denies pulmonary symptoms.     Arrival time, NPO, shower and medication instructions provided by nursing staff today. Preparing For Your Surgery handout given.    Patient is optimized and is acceptable candidate for the proposed procedure.  No further diagnostic evaluation is needed.       SYLVIA Stanton CNS  Preoperative Assessment Center  [facility]  Phone: [phone]  Fax: [phone]

## 2021-01-13 LAB
LABORATORY COMMENT REPORT: NORMAL
SARS-COV-2 RNA RESP QL NAA+PROBE: NEGATIVE
SPECIMEN SOURCE: NORMAL

## 2021-01-15 ENCOUNTER — HOSPITAL ENCOUNTER (OUTPATIENT)
Facility: AMBULATORY SURGERY CENTER | Age: 61
Discharge: HOME | End: 2021-01-15
Attending: COLON & RECTAL SURGERY | Admitting: COLON & RECTAL SURGERY
Payer: COMMERCIAL

## 2021-01-15 ENCOUNTER — ANESTHESIA (OUTPATIENT)
Dept: SURGERY | Facility: AMBULATORY SURGERY CENTER | Age: 61
End: 2021-01-15

## 2021-01-15 VITALS
HEIGHT: 72 IN | WEIGHT: 205 LBS | OXYGEN SATURATION: 97 % | SYSTOLIC BLOOD PRESSURE: 129 MMHG | DIASTOLIC BLOOD PRESSURE: 75 MMHG | TEMPERATURE: 97.2 F | HEART RATE: 52 BPM | RESPIRATION RATE: 18 BRPM | BODY MASS INDEX: 27.77 KG/M2

## 2021-01-15 DIAGNOSIS — K60.30 ANAL FISTULA: ICD-10-CM

## 2021-01-15 PROCEDURE — 46285 REMOVE ANAL FIST 2 STAGE: CPT

## 2021-01-15 PROCEDURE — 46285 REMOVE ANAL FIST 2 STAGE: CPT | Performed by: COLON & RECTAL SURGERY

## 2021-01-15 RX ORDER — ACETAMINOPHEN 325 MG/1
975 TABLET ORAL ONCE
Status: COMPLETED | OUTPATIENT
Start: 2021-01-15 | End: 2021-01-15

## 2021-01-15 RX ORDER — LIDOCAINE 40 MG/G
CREAM TOPICAL
Status: DISCONTINUED | OUTPATIENT
Start: 2021-01-15 | End: 2021-01-16 | Stop reason: HOSPADM

## 2021-01-15 RX ORDER — SODIUM CHLORIDE, SODIUM LACTATE, POTASSIUM CHLORIDE, CALCIUM CHLORIDE 600; 310; 30; 20 MG/100ML; MG/100ML; MG/100ML; MG/100ML
INJECTION, SOLUTION INTRAVENOUS CONTINUOUS
Status: DISCONTINUED | OUTPATIENT
Start: 2021-01-15 | End: 2021-01-16 | Stop reason: HOSPADM

## 2021-01-15 RX ORDER — KETOROLAC TROMETHAMINE 30 MG/ML
30 INJECTION, SOLUTION INTRAMUSCULAR; INTRAVENOUS EVERY 6 HOURS PRN
Status: DISCONTINUED | OUTPATIENT
Start: 2021-01-15 | End: 2021-01-16 | Stop reason: HOSPADM

## 2021-01-15 RX ORDER — PROPOFOL 10 MG/ML
INJECTION, EMULSION INTRAVENOUS PRN
Status: DISCONTINUED | OUTPATIENT
Start: 2021-01-15 | End: 2021-01-15

## 2021-01-15 RX ORDER — ONDANSETRON 2 MG/ML
4 INJECTION INTRAMUSCULAR; INTRAVENOUS EVERY 30 MIN PRN
Status: DISCONTINUED | OUTPATIENT
Start: 2021-01-15 | End: 2021-01-16 | Stop reason: HOSPADM

## 2021-01-15 RX ORDER — BUPIVACAINE HYDROCHLORIDE AND EPINEPHRINE 5; 5 MG/ML; UG/ML
INJECTION, SOLUTION PERINEURAL PRN
Status: DISCONTINUED | OUTPATIENT
Start: 2021-01-15 | End: 2021-01-15 | Stop reason: HOSPADM

## 2021-01-15 RX ORDER — NALOXONE HYDROCHLORIDE 0.4 MG/ML
0.2 INJECTION, SOLUTION INTRAMUSCULAR; INTRAVENOUS; SUBCUTANEOUS
Status: DISCONTINUED | OUTPATIENT
Start: 2021-01-15 | End: 2021-01-16 | Stop reason: HOSPADM

## 2021-01-15 RX ORDER — PROPOFOL 10 MG/ML
INJECTION, EMULSION INTRAVENOUS CONTINUOUS PRN
Status: DISCONTINUED | OUTPATIENT
Start: 2021-01-15 | End: 2021-01-15

## 2021-01-15 RX ORDER — LIDOCAINE HYDROCHLORIDE 20 MG/ML
INJECTION, SOLUTION INFILTRATION; PERINEURAL PRN
Status: DISCONTINUED | OUTPATIENT
Start: 2021-01-15 | End: 2021-01-15

## 2021-01-15 RX ORDER — OXYCODONE HCL 5 MG/5 ML
5 SOLUTION, ORAL ORAL EVERY 4 HOURS PRN
Status: DISCONTINUED | OUTPATIENT
Start: 2021-01-15 | End: 2021-01-16 | Stop reason: HOSPADM

## 2021-01-15 RX ORDER — ONDANSETRON 4 MG/1
4 TABLET, ORALLY DISINTEGRATING ORAL EVERY 30 MIN PRN
Status: DISCONTINUED | OUTPATIENT
Start: 2021-01-15 | End: 2021-01-16 | Stop reason: HOSPADM

## 2021-01-15 RX ORDER — KETAMINE HYDROCHLORIDE 10 MG/ML
INJECTION INTRAMUSCULAR; INTRAVENOUS PRN
Status: DISCONTINUED | OUTPATIENT
Start: 2021-01-15 | End: 2021-01-15

## 2021-01-15 RX ORDER — FENTANYL CITRATE 50 UG/ML
25-50 INJECTION, SOLUTION INTRAMUSCULAR; INTRAVENOUS
Status: DISCONTINUED | OUTPATIENT
Start: 2021-01-15 | End: 2021-01-16 | Stop reason: HOSPADM

## 2021-01-15 RX ORDER — NALOXONE HYDROCHLORIDE 0.4 MG/ML
0.4 INJECTION, SOLUTION INTRAMUSCULAR; INTRAVENOUS; SUBCUTANEOUS
Status: DISCONTINUED | OUTPATIENT
Start: 2021-01-15 | End: 2021-01-16 | Stop reason: HOSPADM

## 2021-01-15 RX ORDER — KETOROLAC TROMETHAMINE 30 MG/ML
INJECTION, SOLUTION INTRAMUSCULAR; INTRAVENOUS PRN
Status: DISCONTINUED | OUTPATIENT
Start: 2021-01-15 | End: 2021-01-15

## 2021-01-15 RX ORDER — ALBUTEROL SULFATE 0.83 MG/ML
2.5 SOLUTION RESPIRATORY (INHALATION) EVERY 4 HOURS PRN
Status: DISCONTINUED | OUTPATIENT
Start: 2021-01-15 | End: 2021-01-16 | Stop reason: HOSPADM

## 2021-01-15 RX ORDER — ONDANSETRON 2 MG/ML
INJECTION INTRAMUSCULAR; INTRAVENOUS PRN
Status: DISCONTINUED | OUTPATIENT
Start: 2021-01-15 | End: 2021-01-15

## 2021-01-15 RX ORDER — MEPERIDINE HYDROCHLORIDE 25 MG/ML
12.5 INJECTION INTRAMUSCULAR; INTRAVENOUS; SUBCUTANEOUS
Status: DISCONTINUED | OUTPATIENT
Start: 2021-01-15 | End: 2021-01-16 | Stop reason: HOSPADM

## 2021-01-15 RX ORDER — GLYCOPYRROLATE 0.2 MG/ML
INJECTION, SOLUTION INTRAMUSCULAR; INTRAVENOUS PRN
Status: DISCONTINUED | OUTPATIENT
Start: 2021-01-15 | End: 2021-01-15

## 2021-01-15 RX ADMIN — PROPOFOL 200 MCG/KG/MIN: 10 INJECTION, EMULSION INTRAVENOUS at 08:00

## 2021-01-15 RX ADMIN — ONDANSETRON 4 MG: 2 INJECTION INTRAMUSCULAR; INTRAVENOUS at 07:58

## 2021-01-15 RX ADMIN — KETAMINE HYDROCHLORIDE 10 MG: 10 INJECTION INTRAMUSCULAR; INTRAVENOUS at 08:03

## 2021-01-15 RX ADMIN — SODIUM CHLORIDE, SODIUM LACTATE, POTASSIUM CHLORIDE, CALCIUM CHLORIDE: 600; 310; 30; 20 INJECTION, SOLUTION INTRAVENOUS at 06:30

## 2021-01-15 RX ADMIN — KETAMINE HYDROCHLORIDE 10 MG: 10 INJECTION INTRAMUSCULAR; INTRAVENOUS at 08:04

## 2021-01-15 RX ADMIN — GLYCOPYRROLATE 0.2 MG: 0.2 INJECTION, SOLUTION INTRAMUSCULAR; INTRAVENOUS at 07:58

## 2021-01-15 RX ADMIN — PROPOFOL 50 MG: 10 INJECTION, EMULSION INTRAVENOUS at 08:01

## 2021-01-15 RX ADMIN — LIDOCAINE HYDROCHLORIDE 60 MG: 20 INJECTION, SOLUTION INFILTRATION; PERINEURAL at 07:59

## 2021-01-15 RX ADMIN — ACETAMINOPHEN 975 MG: 325 TABLET ORAL at 06:29

## 2021-01-15 RX ADMIN — KETOROLAC TROMETHAMINE 30 MG: 30 INJECTION, SOLUTION INTRAMUSCULAR; INTRAVENOUS at 07:58

## 2021-01-15 ASSESSMENT — MIFFLIN-ST. JEOR: SCORE: 1787.87

## 2021-01-15 NOTE — DISCHARGE INSTRUCTIONS
Anorectal Surgery Instructions      What can I expect after anorectal surgery?  Most anorectal procedures are done as outpatient surgery, and you go home the same day as the procedure. A few surgical procedures will require that you stay in the hospital for about one to three days. No matter where the procedure is done or how long or short it takes, these recommendations will help you heal and feel more comfortable.    Medicines:  The anal area is very sensitive; you can expect to have some pain for up to 2-4 weeks after the procedure. Your doctor will give you a prescription for one or more pain medications. In general, there are two types of medicines that can provide relief.      Non-steroidal anti-inflammatory drugs (NSAIDS). This class of drugs includes ibuprophen and naproxen. Your doctor may give you a prescription for these, or you can buy the identical drugs in smaller size tablets, over the counter. Brand names include: Advil or Aleve. In general, it is best to take these drugs on a regular basis following your surgery. Some patients cannot take these drugs, either because they cause stomach upset or if the individual has a history of ulcers or gastritis. The drugs are best taken with food. The maximum dose of ibuprofen is 800 milligrams 3 times per dayOR 600 milligrams 4 times per day. The maximum does of naproxen is 500 milligrams 2 times per day. Your doctor may give you a prescription for one or the other of these drugs, or you can buy them at your drugstore.    Narcotic pain relievers. These include Vicodin, Percocet, and Tylenol number 3. These are all prescription medications. These should be used as prescribed and as needed. Because these drugs have side effects (including constipation), you should reduce your use of these medications as tolerated as your pain improves.    In general, the best strategy is to take (if you are able to tolerate it) an NSAID medication on a regular basis until your  pain has largely gone away. You can take the narcotic pain medicine in addition to the NSAID medicine. Most patients use the full dose of the NSAID medication and narcotic pain reliever for the first few days after their operation. As your pain begins to lessen, you should cut back on your narcotic use while continuing to take your regular NSAID medication.    Some patients find it easiest to transition away from narcotic drugs by also taking acetaminophen (Tylenol). However, it is important to realize that most narcotic pain relievers also have acetaminophen, and excessive doses of acetaminophen can be dangerous.Accordingly, you can substitute 1000 milligrams of acetaminophen (two Extra Strength Tylenol or similar generic) for any given dose of narcotic, but acetominophen should not be taken in addition to a full narcotic dose that contains acetaminophen. The maximum acceptable dose of acetaminophen is 4000 milligrams.    Refilling prescriptions. If you need additional pain medication, please call the triage nurse at [phone] during normal business hours (8 a.m. to 4 p.m., Monday though Friday) or have your pharmacy fax a refill request to [phone]. If you call after hours or on the weekends, the doctor on call may not know you personally and may not renew narcotic pain medication by phone. Call your primary care provider for all other medication refills.     Bowel function and Perineal care:  Bowel movements can be very painful. It is important to have regular bowel movements at least every other day and to keep your stool soft. Your doctor may tell you to take a stool softener, such as Colace, once or twice a day. Try to avoid constipation and/or diarrhea as this can make the pain and bleeding worse. A high fiber diet, including at least four servings of fruits or vegetables daily, will help to keep your bowel movements regular and soft. If you have trouble getting enough fiber in your daily diet, a fiber  supplement can be considered, including Metamucil, Benefiber, or Citrucel, and can be bought at your local grocery store or pharmacy. It is important to drink six to eight glasses of water or juice everyday when using fiber products.    You can expect to have some bleeding after bowel movements, but it should stop soon after you wipe. Use a wet cloth or perianal pad (Tucks or Preparation H pads) to gently wipe the area after each bowel movement. Do not rub the anal area or use a lot of pressure. Using a spray bottle filled with warm water helps loosen any remaining stool. Blot gently with a soft dry cloth or tissue paper.    If possible, take a tub bath immediately after each bowel movement. Baths should be take at least 3 times daily for the first week to 10 days following your procedure. You should soak in the tub for 10 to 15 minutes each time with water as warm as you can tolerate. Even after you go back to work, it is a good idea to sit in the tub in the morning, after returning from work, and again in the evening before bedtime.    Constipation will cause you to strain when you have a bowel movement. The hard stool will be difficult to pass, will increase pain and bleeding, and will slow down healing. If you have not had a bowel movement within 2 days, take 2 teaspoons of Milk of Magnesia in the morning. If there are no results, repeat this. Stop taking Milk of Magnesia or other laxatives if you begin to have diarrhea.    Diarrhea can occur if too much laxative is taken or for several other reasons. If you have 3 or more loose, watery stools in a 24-hour period, stop taking laxatives. Continue to eat a high fiber diet and to take bulk-forming agents such as Metamucil, Benefiber, or Citrucel. You may take Immodium as directed on the package but please call the triage nurse, [phone], if this was not discussed with you surgeon preoperatively.    If you are still having diarrhea or constipation after you have  tried these recommendations, please call the triage nurse line, [phone].    Bleeding/Infection:  Infection around the anal opening is not very common. The anal area has excellent blood supply, which helps the area to heal. Bloody discharge after bowel movements is normal and may last 2 to 4 weeks after your surgery. However, if you bleed between bowel movements and cannot get it to stop, call the triage nurse immediately [phone].    Activity:  After your procedure, there are no restrictions on your activity except restrictions surrounding being on narcotics and in pain, such as no heavy machine operating or driving. You may walk, climb stairs, ride in a car, and sit as tolerated. It is helpful to avoid sitting in one position for long periods (2 or more hours). After some surgeries, you may be told not to perform any lifting (more than 10 pounds) for several weeks after surgery.    When do I need to call the doctor or triage nurse?  If you experience any of the problems listed here, call our triage nurse during business hours ([phone]). The nurse will help you with your problem or have the doctor call you. After hours and on weekends, please call the main hospital number ([phone]) and ask for the colon and rectal surgery person on call. Some is available to help you 24 hours a day, seven days a week.    Fever greater than 101 degrees    Chills    Foul-smelling drainage    Nausea and vomiting    Diarrhea - greater than 3 water stools in 24 hours    Constipation - no bowel movement after 3 days    Severe bleeding that does not stop soon after a bowel movement    Problems with the incision, including increased pain, swelling, or redness  [facility]  Home Care Following Anesthesia  For 24 hours after surgery:  1. Get plenty of rest.  A responsible adult must stay with you for at least 24 hours after you leave the surgery center.  2. Do not drive or use heavy  equipment.  If you have weakness or tingling, don't drive or use heavy equipment until this feeling goes away.   3. Do not drink alcohol.   4. Avoid strenuous or risky activities.  Ask for help when climbing stairs.  5. You may feel lightheaded.  IF so, sit for a few minutes before standing.  Have someone help you get up.   6. If you have nausea (feel sick to your stomach): Drink only clear liquids such as apple juice, ginger ale, broth or 7-Up.  Rest may also help.  Be sure to drink enough fluids.  Move to a regular diet as you feel able.   7. You may have a slight fever.  Call the doctor if your fever is over 100 F (37.7 C) (taken under the tongue) or lasts longer than 24 hours.  8. You may have a dry mouth, a sore throat, muscle aches or trouble sleeping. These should go away after 24 hours.  9. Do not make important or legal decisions.               Tips for taking pain medications  To get the best pain relief possible, remember these points:    Take pain medications as directed, before pain becomes severe.    Pain medication can upset your stomach: taking it with food may help.    Constipation is a common side effect of pain medication. Drink plenty of  fluids.    Eat foods high in fiber. Take a stool softener if recommended by your doctor or pharmacist.    Do not drink alcohol, drive or operate machinery while taking pain medications.    Ask about other ways to control pain, such as with heat, ice or relaxation.    Tylenol/Acetaminophen Consumption: Tylenol 975 mg given at 0630.   To help encourage the safe use of acetaminophen, the makers of TYLENOL  have lowered the maximum daily dose for single-ingredient Extra Strength TYLENOL  (acetaminophen) products sold in the [place] from 8 pills per day (4,000 mg) to 6 pills per day (3,000 mg). The dosing interval has also changed from 2 pills every 4-6 hours to 2 pills every 6 hours.    If you feel your pain relief is insufficient, you may take Tylenol/Acetaminophen in  addition to your narcotic pain medication.     Be careful not to exceed 3,000 mg of Tylenol/Acetaminophen in a 24 hour period from all sources.    If you are taking extra strength Tylenol/acetaminophen (500 mg), the maximum dose is 6 tablets in 24 hours.    If you are taking regular strength acetaminophen (325 mg), the maximum dose is 9 tablets in 24 hours.    Call a doctor for any of the followin. Signs of infection (fever, growing tenderness at the surgery site, a large amount of drainage or bleeding, severe pain, foul-smelling drainage, redness, swelling).  2. It has been over 8 to 10 hours since surgery and you are still not able to urinate (pass water).  3. Headache for over 24 hours.  4. Signs of Covid-19 infection (temperature over 100 degrees, shortness of breath, cough, loss of taste/smell, generalized body aches, persistent headache, chills, sore throat, nausea/vomiting/diarrhea)  Your doctor is:       Dr. Camila Calderon, Colon Rectal: [phone]               Or dial [phone] and ask for the resident on call for:  General Surgery  For emergency care, call the:  [place]:  [phone] (TTY for hearing impaired: [phone])

## 2021-01-15 NOTE — OP NOTE
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     POSTOPERATIVE DIAGNOSIS: Same.     PROCEDURE: Examination under anesthesia, completion fistulotomy.     INDICATIONS: Tremayne Galarza is a 60 year old male who previously had a large posterior fistula that is intersphincteric with a large internal orifice. This has taken almost a year to control but now is doing very well. The risks and benefits of surgery were thoroughly discussed with the patient and he agreed to proceed. The risks and benefits of surgery were thoroughly discussed with the patient and he agreed to proceed.     DESCRIPTION OF PROCEDURE: The patient was brought to the operating room, placed prone on the operating room table. Deep sedation was induced with intravenous medicines with deep sedation and monitored anesthesia care. We prepped and draped the area in the usual sterile fashion. We began the procedure with a timeout and performed an anal block using a mixture 50/50 of bupivacaine and lidocaine with epinephrine. A total of 20 mL were infused and following this, we performed anoscopy which demonstrated a posterior seton with a fistula to the right of midline. This include about 2/3 of the internal anal sphincter and did not involve the external anal sphincter. A completion fistulotomy was performed and hemostasis was obtained. The fistula was mature with good epithelialization. At the end the case, all instruments and sponge counts were correct x2. The patient was emerged from anesthesia and taken to postoperative anesthesia care unit in good condition.     ESTIMATED BLOOD LOSS: Minimal.     URINE OUTPUT: Not measured.     JOSIAS MCGRATH MD   Colon and Rectal Surgery Staff  [facility]

## 2021-01-15 NOTE — ANESTHESIA POSTPROCEDURE EVALUATION
Anesthesia POST Procedure Evaluation    Patient: Tremayne Galarza   MRN:     [id number] Gender:   male   Age:    60 year old :      1960        Preoperative Diagnosis: Anal fistula [K60.3]   Procedure(s):  EXAM UNDER ANESTHESIA, ANUS, Completion fistulotomy   Postop Comments: No value filed.     Anesthesia Type: MAC       Disposition: Outpatient   Postop Pain Control: Uneventful            Sign Out: Well controlled pain   PONV: No   Neuro/Psych: Uneventful            Sign Out: Acceptable/Baseline neuro status   Airway/Respiratory: Uneventful            Sign Out: Acceptable/Baseline resp. status   CV/Hemodynamics: Uneventful            Sign Out: Acceptable CV status   Other NRE: NONE   DID A NON-ROUTINE EVENT OCCUR? No         Last Anesthesia Record Vitals:  CRNA VITALS  1/15/2021 0748 - 1/15/2021 0848      1/15/2021             Resp Rate (set):  10          Last PACU Vitals:  Vitals Value Taken Time   /75 01/15/21 0838   Temp 35.9  C (96.7  F) 01/15/21 0823   Pulse 51 01/15/21 0838   Resp 11 01/15/21 0838   SpO2 98 % 01/15/21 0838   Temp src     NIBP     Pulse     SpO2     Resp     Temp     Ht Rate     Temp 2     Vitals shown include unvalidated device data.      Electronically Signed By: Kolby Martines MD, January 15, 2021, 8:49 AM

## 2021-01-15 NOTE — ANESTHESIA CARE TRANSFER NOTE
Patient: Tremayne Galarza    Procedure(s):  EXAM UNDER ANESTHESIA, ANUS, Completion fistulotomy    Diagnosis: Anal fistula [K60.3]  Diagnosis Additional Information: No value filed.    Anesthesia Type:   MAC     Note:  Airway :Nasal Cannula  Patient transferred to:PACU  Comments: Uneventful transport to PACU; VSS; Report given to RN; Pt comfortable; IV patent: pt exchanging wellHandoff Report: Identifed the Patient, Identified the Reponsible Provider, Reviewed the pertinent medical history, Discussed the surgical course, Reviewed Intra-OP anesthesia mangement and issues during anesthesia, Set expectations for post-procedure period and Allowed opportunity for questions and acknowledgement of understanding      Vitals: (Last set prior to Anesthesia Care Transfer)    CRNA VITALS  1/15/2021 0748 - 1/15/2021 0819      1/15/2021             Resp Rate (set):  10                Electronically Signed By: SYLVIA Aguilera CRNA  January 15, 2021  8:19 AM

## 2021-02-01 ENCOUNTER — OFFICE VISIT (OUTPATIENT)
Dept: SURGERY | Facility: CLINIC | Age: 61
End: 2021-02-01
Payer: COMMERCIAL

## 2021-02-01 VITALS
HEIGHT: 72 IN | BODY MASS INDEX: 28.47 KG/M2 | WEIGHT: 210.2 LBS | OXYGEN SATURATION: 96 % | DIASTOLIC BLOOD PRESSURE: 80 MMHG | SYSTOLIC BLOOD PRESSURE: 144 MMHG | HEART RATE: 56 BPM

## 2021-02-01 DIAGNOSIS — K60.30 ANAL FISTULA: ICD-10-CM

## 2021-02-01 DIAGNOSIS — Z09 FOLLOW-UP EXAMINATION FOLLOWING SURGERY: Primary | ICD-10-CM

## 2021-02-01 PROCEDURE — 99024 POSTOP FOLLOW-UP VISIT: CPT | Performed by: NURSE PRACTITIONER

## 2021-02-01 ASSESSMENT — MIFFLIN-ST. JEOR: SCORE: 1811.46

## 2021-02-01 ASSESSMENT — PAIN SCALES - GENERAL: PAINLEVEL: NO PAIN (0)

## 2021-02-01 NOTE — NURSING NOTE
Chief Complaint   Patient presents with     Surgical Followup     Post-op DOS: 1/15/2021 EUA, completion fistulotomy       Vitals:    02/01/21 1521   BP: (!) 144/80   BP Location: Left arm   Patient Position: Sitting   Cuff Size: Adult Regular   Pulse: 56   SpO2: 96%   Weight: 210 lb 3.2 oz   Height: 6'       Body mass index is 28.51 kg/m .    Breanne Amezcua MA

## 2021-02-01 NOTE — LETTER
2021       RE: Tremayne Galarza  [address] [facility] [id number]     Dear Colleague,    Thank you for referring your patient, Tremayne Galarza, to the [facility] [place] [facility]. Please see a copy of my visit note below.    Colon and Rectal Surgery Postoperative Clinic Note    RE: Tremayne Galarza  : 1960  STEFANIA: 2021    Tremayne Galarza is a very pleasant 60 year old male here for postoperative follow up.    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     He developed two small boils on his buttock and hip with cultures showing staphylococcus aureus and he was treated with Augmentin.    MRI Pelvis 20:  1. 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  2. Near complete resolution of the previously seen posterior perianal  abscess, with minimal residual dilation of the superior fistula tract.  3. Skin and subcutaneous soft tissue thickening and enhancement in the  anterior left perineal region corresponding to the clinically visible  lesion.    Colonoscopy in  was normal.    He saw dermatology and was treated for folliculitis     Tremayne has had a large posterior fistula that is intersphincteric with a large internal orifice. This has gotten worse despite a seton. An Magnetic Resonance Imaging (MRI) 20 showed complexity and a horseshoe component to the right.    He is now status post examination under anesthesia with partial fistulotomy, debridement of the fistula and placement of a seton on 20 with a large internal orifice, extension to the deep post-anal space and horseshoeing to the right    I saw him in clinic on 20 and wanted to give the internal cavity some more time to heal before removing one of the setons and to get an MRI to ensure there was no branching  and internal orificehas improved.    MR Pelvis 9/9/20:  Redemonstration of the posterior midline intersphincteric  fistula with seton in place which again bifurcates into superior and  inferior component. Decreased enhancement and T2 hyperintensity of the  primary tract since prior, improving but persistent active  inflammation. No new fistula formation.    I met with Tremayne in September and removed one of his setons at that time.    MR Pelvis 10/15/20:  Impression:   1. Redemonstration of posterior midline intersphincteric fistula with  seton in place, showing ramifications and slightly decreased  horseshoeing component on the right side with persistent inflammatory  change.  2. No new fistula or abscess.    MR Pelvis 12/9/20:  Impression:   1. Redemonstration of posterior midline intersphincteric fistula with  seton in place with minimally decreased horseshoeing component on the  right side with persistent inflammatory changes.  2. No new fistula or abscess is identified.    He is now status post examination under anesthesia with completion fistulotomy on 1/15/21 with Dr. Calderon     Interval history: Tremayne has been doing well. He denies any pain. Only slight drainage. He is taking Miralax to keep stools soft. No difficulty holding bowel movements.    Physical Examination: Exam was chaperoned by Breanne Amezcua MA  BP (!) 144/80 (BP Location: Left arm, Patient Position: Sitting, Cuff Size: Adult Regular)   Pulse 56   Ht 6'   Wt 210 lb 3.2 oz   SpO2 96%   BMI 28.51 kg/m    General: alert, oriented, in no acute distress, sitting comfortably  HEENT: mucous membranes moist  Perianal external examination:  Surgical site in the posterior position with good granulation tissue. No erythema. Some fecal material present on perianal skin.  Digital rectal examination: Was deferred.    Anoscopy: Was deferred.    Assessment/Plan:  60 year old male status post examination under anesthesia with completion fistulotomy on  1/15/21 with Dr. Calderon. He is recovering well. Recommended backing off on the Miralax and using a barrier cream on his perianal skin due to stool leakage. Surgical site is healing well. He can follow up as needed. Patient's questions were answered to his stated satisfaction and he is in agreement with this plan.      Medical history:  Past Medical History:   Diagnosis Date     Chronic ischemic heart disease 9/4/2014    Overview:  3 stents to RCA 8/2014      Essential hypertension 2/11/2005    Overview:  LW Onset:  56Gur94      Fistula      Hyperlipidemia 11/14/2005    Overview:  LW Onset:  80Rsw30      Perirectal abscess      Staph infection        Surgical history:  Past Surgical History:   Procedure Laterality Date     BIOPSY       CARDIAC SURGERY       COLONOSCOPY N/A 12/30/2015    Procedure: COLONOSCOPY;  Surgeon: Duane, William Charles, MD;  Location: [facility] OR     COLONOSCOPY WITH CO2 INSUFFLATION N/A 12/30/2015    Procedure: COLONOSCOPY WITH CO2 INSUFFLATION;  Surgeon: Duane, William Charles, MD;  Location: [facility] OR     EXAM UNDER ANESTHESIA ANUS N/A 1/10/2020    Procedure: EXAM UNDER ANESTHESIA, ANUS, inscision and drainage of perirectal abscess, partial fistulotomy,  seton placement;  Surgeon: Camila Calderon MD;  Location: [facility]     EXAM UNDER ANESTHESIA ANUS N/A 6/19/2020    Procedure: Exam Under Anesthesia, Fistulotomy, Seton placement;  Surgeon: Camila Calderon MD;  [facility]     EXAM UNDER ANESTHESIA ANUS N/A 1/15/2021    Procedure: EXAM UNDER ANESTHESIA, ANUS, Completion fistulotomy;  Surgeon: Camila Calderon MD;  [facility]     FISTULOTOMY RECTUM N/A 1/10/2020    Procedure: partial FISTULOTOMY, RECTUM;  Surgeon: Camila Calderon MD;  [facility]     PLACEMENT OF SETON RECTUM N/A 1/10/2020    Procedure: PLACEMENT, SETON STITCH;  Surgeon: Camila Calderon MD;  Location: [facility]       Problem list:    Patient Active Problem List     Diagnosis Date Noted     Staph infection 03/03/2020     Priority: Medium     History of nonmelanoma skin cancer 03/03/2020     Priority: Medium     History of dysplastic nevus 03/03/2020     Priority: Medium     Perianal abscess 01/09/2020     Priority: Medium     Added automatically from request for surgery [id number]       Anal fistula 01/09/2020     Priority: Medium     Added automatically from request for surgery [id number]       Verruca plantaris 04/11/2018     Priority: Medium     LUQ abdominal pain 03/27/2018     Priority: Medium     CAD (coronary artery disease) 08/05/2015     Priority: Medium     Status post coronary angiogram 03/26/2015     Priority: Medium     Nondependent alcohol abuse 02/03/2015     Priority: Medium     Chronic ischemic heart disease 09/04/2014     Priority: Medium     Overview:   3 stents to RCA 8/2014       Postsurgical percutaneous transluminal coronary angioplasty status 09/04/2014     Priority: Medium     Benign neoplasm of colon 06/05/2013     Priority: Medium     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 Provider to review and confirm.       HTN, goal below 140/90 02/11/2005     Priority: Medium     Overview:   LW Onset:  18Cea86       Obesity 02/11/2005     Priority: Medium     Overview:   LW Onset:  99Ggc93       Allergic rhinitis 02/11/2005     Priority: Medium     Overview:   LW Onset:  69Gku89         Medications:  Current Outpatient Medications   Medication Sig Dispense Refill     aspirin (CHILDRENS ASPIRIN) 81 MG chewable tablet Take 81 mg by mouth       atenolol (TENORMIN) 25 MG tablet Take 1 tablet (25 mg) by mouth daily 90 tablet 3     ezetimibe (ZETIA) 10 MG tablet Take 1 tablet (10 mg) by mouth daily 90 tablet 2     fexofenadine (ALLEGRA) 60 MG tablet Take 0.5 tablets (30 mg) by mouth daily 60 tablet 3      mupirocin (BACTROBAN) 2 % external ointment Use 3 times per day for 3 weeks. Then the first week of every month for 6 months. 60 g 2     rosuvastatin (CRESTOR) 40 MG tablet Take 1 tablet (40 mg) by mouth daily 90 tablet 3     sildenafil (VIAGRA) 50 MG tablet Take 1 tablet (50 mg) by mouth daily as needed 10 tablet 0       Allergies:  No Known Allergies    Family history:  Family History   Problem Relation Age of Onset     Coronary Artery Disease Mother      Hypertension Mother      Cerebrovascular Disease Mother        Social history:  Social History     Tobacco Use     Smoking status: Never Smoker     Smokeless tobacco: Never Used   Substance Use Topics     Alcohol use: Yes     Comment: 2-3 drinks weekly     Marital status: .    Nursing Notes:   Breanne Amezcua  2/1/2021  3:23 PM  Signed  Chief Complaint   Patient presents with     Surgical Followup     Post-op DOS: 1/15/2021 EUA, completion fistulotomy       Vitals:    02/01/21 1521   BP: (!) 144/80   BP Location: Left arm   Patient Position: Sitting   Cuff Size: Adult Regular   Pulse: 56   SpO2: 96%   Weight: 210 lb 3.2 oz   Height: 6'       Body mass index is 28.51 kg/m .    Breanne Amezcua MA       10 minutes spent on the date of the encounter doing chart review, history and exam, documentation and further activities as noted above.   This is a postop visit.    SYLVIA Tai, NP-C  Colon and Rectal Surgery  [facility]    This note was created using speech recognition software and may contain unintended word substitutions.

## 2021-02-01 NOTE — PROGRESS NOTES
Colon and Rectal Surgery Postoperative Clinic Note    RE: Tremayne Galarza  : 1960  STEFANIA: 2021    Tremayne Galarza is a very pleasant 60 year old male here for postoperative follow up.    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     He developed two small boils on his buttock and hip with cultures showing staphylococcus aureus and he was treated with Augmentin.    MRI Pelvis 20:  1. 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  2. Near complete resolution of the previously seen posterior perianal  abscess, with minimal residual dilation of the superior fistula tract.  3. Skin and subcutaneous soft tissue thickening and enhancement in the  anterior left perineal region corresponding to the clinically visible  lesion.    Colonoscopy in  was normal.    He saw dermatology and was treated for folliculitis     Tremayne has had a large posterior fistula that is intersphincteric with a large internal orifice. This has gotten worse despite a seton. An Magnetic Resonance Imaging (MRI) 20 showed complexity and a horseshoe component to the right.    He is now status post examination under anesthesia with partial fistulotomy, debridement of the fistula and placement of a seton on 20 with a large internal orifice, extension to the deep post-anal space and horseshoeing to the right    I saw him in clinic on 20 and wanted to give the internal cavity some more time to heal before removing one of the setons and to get an MRI to ensure there was no branching and internal orificehas improved.    MR Pelvis 20:  Redemonstration of the posterior midline intersphincteric  fistula with seton in place which again bifurcates into superior and  inferior component. Decreased enhancement and T2 hyperintensity of the  primary tract since prior, improving but persistent  active  inflammation. No new fistula formation.    I met with Tremayne in September and removed one of his setons at that time.    MR Pelvis 10/15/20:  Impression:   1. Redemonstration of posterior midline intersphincteric fistula with  seton in place, showing ramifications and slightly decreased  horseshoeing component on the right side with persistent inflammatory  change.  2. No new fistula or abscess.    MR Pelvis 12/9/20:  Impression:   1. Redemonstration of posterior midline intersphincteric fistula with  seton in place with minimally decreased horseshoeing component on the  right side with persistent inflammatory changes.  2. No new fistula or abscess is identified.    He is now status post examination under anesthesia with completion fistulotomy on 1/15/21 with Dr. Calderon     Interval history: Tremayne has been doing well. He denies any pain. Only slight drainage. He is taking Miralax to keep stools soft. No difficulty holding bowel movements.    Physical Examination: Exam was chaperoned by Breanne Amezcua MA  BP (!) 144/80 (BP Location: Left arm, Patient Position: Sitting, Cuff Size: Adult Regular)   Pulse 56   Ht 6'   Wt 210 lb 3.2 oz   SpO2 96%   BMI 28.51 kg/m    General: alert, oriented, in no acute distress, sitting comfortably  HEENT: mucous membranes moist  Perianal external examination:  Surgical site in the posterior position with good granulation tissue. No erythema. Some fecal material present on perianal skin.  Digital rectal examination: Was deferred.    Anoscopy: Was deferred.    Assessment/Plan:  60 year old male status post examination under anesthesia with completion fistulotomy on 1/15/21 with Dr. Calderon. He is recovering well. Recommended backing off on the Miralax and using a barrier cream on his perianal skin due to stool leakage. Surgical site is healing well. He can follow up as needed. Patient's questions were answered to his stated satisfaction and he is in agreement with  this plan.      Medical history:  Past Medical History:   Diagnosis Date     Chronic ischemic heart disease 9/4/2014    Overview:  3 stents to RCA 8/2014      Essential hypertension 2/11/2005    Overview:  LW Onset:  63Cuw69      Fistula      Hyperlipidemia 11/14/2005    Overview:  LW Onset:  14Nov05      Perirectal abscess      Staph infection        Surgical history:  Past Surgical History:   Procedure Laterality Date     BIOPSY       CARDIAC SURGERY       COLONOSCOPY N/A 12/30/2015    Procedure: COLONOSCOPY;  Surgeon: Duane, William Charles, MD;  Location: [facility] OR     COLONOSCOPY WITH CO2 INSUFFLATION N/A 12/30/2015    Procedure: COLONOSCOPY WITH CO2 INSUFFLATION;  Surgeon: Duane, William Charles, MD;  Location: [facility] OR     EXAM UNDER ANESTHESIA ANUS N/A 1/10/2020    Procedure: EXAM UNDER ANESTHESIA, ANUS, inscision and drainage of perirectal abscess, partial fistulotomy,  seton placement;  Surgeon: Camila Calderon MD;  [facility]     EXAM UNDER ANESTHESIA ANUS N/A 6/19/2020    Procedure: Exam Under Anesthesia, Fistulotomy, Seton placement;  Surgeon: Camila Calderon MD;  [facility]     EXAM UNDER ANESTHESIA ANUS N/A 1/15/2021    Procedure: EXAM UNDER ANESTHESIA, ANUS, Completion fistulotomy;  Surgeon: Camila Calderon MD;  [facility]     FISTULOTOMY RECTUM N/A 1/10/2020    Procedure: partial FISTULOTOMY, RECTUM;  Surgeon: Camila Calderon MD;  [facility]     PLACEMENT OF SETON RECTUM N/A 1/10/2020    Procedure: PLACEMENT, SETON STITCH;  Surgeon: Camila Calderon MD;  Location: [facility]       Problem list:    Patient Active Problem List    Diagnosis Date Noted     Staph infection 03/03/2020     Priority: Medium     History of nonmelanoma skin cancer 03/03/2020     Priority: Medium     History of dysplastic nevus 03/03/2020     Priority: Medium     Perianal abscess 01/09/2020     Priority: Medium     Added automatically from request for surgery  [id number]       Anal fistula 01/09/2020     Priority: Medium     Added automatically from request for surgery [id number]       Verruca plantaris 04/11/2018     Priority: Medium     LUQ abdominal pain 03/27/2018     Priority: Medium     CAD (coronary artery disease) 08/05/2015     Priority: Medium     Status post coronary angiogram 03/26/2015     Priority: Medium     Nondependent alcohol abuse 02/03/2015     Priority: Medium     Chronic ischemic heart disease 09/04/2014     Priority: Medium     Overview:   3 stents to RCA 8/2014       Postsurgical percutaneous transluminal coronary angioplasty status 09/04/2014     Priority: Medium     Benign neoplasm of colon 06/05/2013     Priority: Medium     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 Provider to review and confirm.       HTN, goal below 140/90 02/11/2005     Priority: Medium     Overview:   LW Onset:  29Aij87       Obesity 02/11/2005     Priority: Medium     Overview:   LW Onset:  11Feb05       Allergic rhinitis 02/11/2005     Priority: Medium     Overview:   LW Onset:  11Feb05         Medications:  Current Outpatient Medications   Medication Sig Dispense Refill     aspirin (CHILDRENS ASPIRIN) 81 MG chewable tablet Take 81 mg by mouth       atenolol (TENORMIN) 25 MG tablet Take 1 tablet (25 mg) by mouth daily 90 tablet 3     ezetimibe (ZETIA) 10 MG tablet Take 1 tablet (10 mg) by mouth daily 90 tablet 2     fexofenadine (ALLEGRA) 60 MG tablet Take 0.5 tablets (30 mg) by mouth daily 60 tablet 3     mupirocin (BACTROBAN) 2 % external ointment Use 3 times per day for 3 weeks. Then the first week of every month for 6 months. 60 g 2     rosuvastatin (CRESTOR) 40 MG tablet Take 1 tablet (40 mg) by mouth daily 90 tablet 3     sildenafil (VIAGRA) 50 MG tablet Take 1 tablet (50 mg) by mouth daily as needed 10  tablet 0       Allergies:  No Known Allergies    Family history:  Family History   Problem Relation Age of Onset     Coronary Artery Disease Mother      Hypertension Mother      Cerebrovascular Disease Mother        Social history:  Social History     Tobacco Use     Smoking status: Never Smoker     Smokeless tobacco: Never Used   Substance Use Topics     Alcohol use: Yes     Comment: 2-3 drinks weekly     Marital status: .    Nursing Notes:   Breanne Amezcua  2/1/2021  3:23 PM  Signed  Chief Complaint   Patient presents with     Surgical Followup     Post-op DOS: 1/15/2021 EUA, completion fistulotomy       Vitals:    02/01/21 1521   BP: (!) 144/80   BP Location: Left arm   Patient Position: Sitting   Cuff Size: Adult Regular   Pulse: 56   SpO2: 96%   Weight: 210 lb 3.2 oz   Height: 6'       Body mass index is 28.51 kg/m .    Breanne Amezcua MA         10 minutes spent on the date of the encounter doing chart review, history and exam, documentation and further activities as noted above.   This is a postop visit.    SYLVIA Tai, NP-C  Colon and Rectal Surgery  [facility]    This note was created using speech recognition software and may contain unintended word substitutions.

## 2021-02-06 NOTE — PROGRESS NOTES
Colon and Rectal Surgery Clinic Note     Referring provider:  Referred Self, MD  No address on file     RE: Tremayne Galarza  : 1960  STEFANIA: 2020    Tremayne Galarza is a very pleasant 60 year old male here for follow-up of anorectal fistula.     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     He developed two small boils on his buttock and hip with cultures showing staphylococcus aureus and he was treated with Augmentin.    MRI Pelvis 20:  1. 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  2. Near complete resolution of the previously seen posterior perianal  abscess, with minimal residual dilation of the superior fistula tract.  3. Skin and subcutaneous soft tissue thickening and enhancement in the  anterior left perineal region corresponding to the clinically visible  lesion.    Colonoscopy in  was normal.    He saw dermatology and was treated for folliculitis     Tremayne has had a large posterior fistula that is intersphincteric with a large internal orifice. This has gotten worse despite a seton. An Magnetic Resonance Imaging (MRI) 20 showed complexity and a horseshoe component to the right.    He is now status post examination under anesthesia with partial fistulotomy, debridement of the fistula and placement of a seton on 20 with a large internal orifice, extension to the deep post-anal space and horseshoeing to the right    I saw him in clinic on 20 and wanted to give the internal cavity some more time to heal before removing one of the setons and to get an MRI to ensure there was no branching and internal orificehas improved.    MR Pelvis 20:  Redemonstration of the posterior midline intersphincteric  fistula with seton in place which again bifurcates into superior and  inferior component. Decreased enhancement and T2  hyperintensity of the  primary tract since prior, improving but persistent active  inflammation. No new fistula formation.    I met with Tremayne in September and removed one of his setons at that time.    MR Pelvis 10/15/20:  Impression:   1. Redemonstration of posterior midline intersphincteric fistula with  seton in place, showing ramifications and slightly decreased  horseshoeing component on the right side with persistent inflammatory  change.  2. No new fistula or abscess.    MR Pelvis 12/9/20:  Impression:   1. Redemonstration of posterior midline intersphincteric fistula with  seton in place with minimally decreased horseshoeing component on the  right side with persistent inflammatory changes.  2. No new fistula or abscess is identified.    Interval history: Overall, he is doing well with minimal drainage and functioning well at work.  He has been over a year since this began.  The seton remains in place.    PLEASE SEE NOTE BELOW FOR PHYSICAL EXAMINATION, REVIEW OF SYSTEMS, AND OTHER HISTORY.    Assessment/Plan:  60 year old male with anorectal fistula with large internal orifice and extension to the deep post-anal space and horseshoeing to the right.  Overall he is significantly improved with minimal drainage at this point in a MRI that is improved.  We reviewed the findings of it which still do show a small amount of additional horseshoeing on the right.  On examination today, the internal orifice appears to be more proximal only involving about half of the internal anal sphincter.  I encouraged Tremayne to start tugging on the seton.  We will have the option of potentially doing a completion fistulotomy versus a stage procedure.  I would like to do this in approximately 1 month.  We will schedule him for a examination under anesthesia with possible completion fistulotomy versus partial fistulotomy.  He will need to be seen in [facility] ([facility]  Answer multiple of his questions.    Total  time was 15 minutes, over 50% was spent counseling the patient.     PLEASE SEE NOTE BELOW FOR PHYSICAL EXAMINATION, REVIEW OF SYSTEMS, AND OTHER HISTORY.    Camila Calderon MD  Colon and Rectal Surgery Staff  [facility]    -------------------------------------------------------------------------------------------------------------------    Medical history:  Past Medical History:   Diagnosis Date     Chronic ischemic heart disease 9/4/2014    Overview:  3 stents to RCA 8/2014      Essential hypertension 2/11/2005    Overview:  LW Onset:  [id number]      Fistula      Hyperlipidemia 11/14/2005    Overview:  LW Onset:  14Nov05      Perirectal abscess      Staph infection        Surgical history:  Past Surgical History:   Procedure Laterality Date     BIOPSY       CARDIAC SURGERY       COLONOSCOPY N/A 12/30/2015    Procedure: COLONOSCOPY;  Surgeon: Duane, William Charles, MD;  Location: [facility] OR     COLONOSCOPY WITH CO2 INSUFFLATION N/A 12/30/2015    Procedure: COLONOSCOPY WITH CO2 INSUFFLATION;  Surgeon: Duane, William Charles, MD;  Location: [facility] OR     EXAM UNDER ANESTHESIA ANUS N/A 1/10/2020    Procedure: EXAM UNDER ANESTHESIA, ANUS, inscision and drainage of perirectal abscess, partial fistulotomy,  seton placement;  Surgeon: Camila Calderon MD;  Location: [facility]     EXAM UNDER ANESTHESIA ANUS N/A 6/19/2020    Procedure: Exam Under Anesthesia, Fistulotomy, Seton placement;  Surgeon: Camila Calderon MD;  [facility]     EXAM UNDER ANESTHESIA ANUS N/A 1/15/2021    Procedure: EXAM UNDER ANESTHESIA, ANUS, Completion fistulotomy;  Surgeon: Camila Calderon MD;  Location[facility]     FISTULOTOMY RECTUM N/A 1/10/2020    Procedure: partial FISTULOTOMY, RECTUM;  Surgeon: Camila Calderon MD;  [facility]     PLACEMENT OF SETON RECTUM N/A 1/10/2020    Procedure: PLACEMENT, SETON STITCH;  Surgeon: Camila Calderon MD;  Location: [facility]        Problem list:    Patient Active Problem List    Diagnosis Date Noted     Staph infection 03/03/2020     Priority: Medium     History of nonmelanoma skin cancer 03/03/2020     Priority: Medium     History of dysplastic nevus 03/03/2020     Priority: Medium     Perianal abscess 01/09/2020     Priority: Medium     Added automatically from request for surgery [id number]       Anal fistula 01/09/2020     Priority: Medium     Added automatically from request for surgery [id number]       Verruca plantaris 04/11/2018     Priority: Medium     LUQ abdominal pain 03/27/2018     Priority: Medium     CAD (coronary artery disease) 08/05/2015     Priority: Medium     Status post coronary angiogram 03/26/2015     Priority: Medium     Nondependent alcohol abuse 02/03/2015     Priority: Medium     Chronic ischemic heart disease 09/04/2014     Priority: Medium     Overview:   3 stents to RCA 8/2014       Postsurgical percutaneous transluminal coronary angioplasty status 09/04/2014     Priority: Medium     Benign neoplasm of colon 06/05/2013     Priority: Medium     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 Provider to review and confirm.       HTN, goal below 140/90 02/11/2005     Priority: Medium     Overview:   LW Onset:  [id number]       Obesity 02/11/2005     Priority: Medium     Overview:   LW Onset:  46Nie65       Allergic rhinitis 02/11/2005     Priority: Medium     Overview:   LW Onset:  50Vmm68         Medications:  Current Outpatient Medications   Medication Sig Dispense Refill     aspirin (CHILDRENS ASPIRIN) 81 MG chewable tablet Take 81 mg by mouth       atenolol (TENORMIN) 25 MG tablet Take 1 tablet (25 mg) by mouth daily 90 tablet 3     ezetimibe (ZETIA) 10 MG tablet Take 1 tablet (10 mg) by mouth daily 90 tablet 2     fexofenadine (ALLEGRA) 60 MG tablet  Take 0.5 tablets (30 mg) by mouth daily 60 tablet 3     mupirocin (BACTROBAN) 2 % external ointment Use 3 times per day for 3 weeks. Then the first week of every month for 6 months. 60 g 2     rosuvastatin (CRESTOR) 40 MG tablet Take 1 tablet (40 mg) by mouth daily 90 tablet 3     sildenafil (VIAGRA) 50 MG tablet Take 1 tablet (50 mg) by mouth daily as needed 10 tablet 0       Allergies:  No Known Allergies    Family history:  Family History   Problem Relation Age of Onset     Coronary Artery Disease Mother      Hypertension Mother      Cerebrovascular Disease Mother        Social history:  Social History     Socioeconomic History     Marital status:      Spouse name: Not on file     Number of children: Not on file     Years of education: Not on file     Highest education level: Not on file   Occupational History     Not on file   Social Needs     Financial resource strain: Not on file     Food insecurity     Worry: Not on file     Inability: Not on file     Transportation needs     Medical: Not on file     Non-medical: Not on file   Tobacco Use     Smoking status: Never Smoker     Smokeless tobacco: Never Used   Substance and Sexual Activity     Alcohol use: Yes     Comment: 2-3 drinks weekly     Drug use: No     Sexual activity: Yes     Partners: Female   Lifestyle     Physical activity     Days per week: Not on file     Minutes per session: Not on file     Stress: Not on file   Relationships     Social connections     Talks on phone: Not on file     Gets together: Not on file     Attends [employer] service: Not on file     Active member of club or organization: Not on file     Attends meetings of clubs or organizations: Not on file     Relationship status: Not on file     Intimate partner violence     Fear of current or ex partner: Not on file     Emotionally abused: Not on file     Physically abused: Not on file     Forced sexual activity: Not on file   Other Topics Concern     Parent/sibling w/ CABG, MI  or angioplasty before 65F 55M? Not Asked   Social History Narrative     Not on file         Nursing Notes:   Reina Roberts CMA  12/23/2020  3:58 PM  Signed  Chief Complaint   Patient presents with     Follow Up     Fistula follow up.       Vitals:    12/23/20 1539   BP: 134/78   BP Location: Left arm   Patient Position: Sitting   Cuff Size: Adult Regular   Pulse: 60   Temp: 98.6  F (37  C)   TempSrc: Oral   SpO2: 95%   Weight: 203 lb 9.6 oz   Height: 6'       Body mass index is 27.61 kg/m .          Reina Banks CMA         Physical Examination:  /78 (BP Location: Left arm, Patient Position: Sitting, Cuff Size: Adult Regular)   Pulse 60   Temp 98.6  F (37  C) (Oral)   Ht 6'   Wt 203 lb 9.6 oz   SpO2 95%   BMI 27.61 kg/m    General: alert, oriented, in no acute distress, sitting comfortably  Perianal external examination:  1 yellow vessel loops seton in place in the right posterior position with external site healed up to the seton. Minimal drainage and no induration or fluctuance.   Digital rectal examination: Demonstrated the seton internally encircling approximately half of the internal anal sphincter.  Anoscopy: Confirmed the seton encircling approximately half of the internal anal sphincter with proximal scarring.

## 2021-03-02 NOTE — PROGRESS NOTES
Colon and Rectal Surgery telephone note      RE: Tremayne Galarza  : 1960  STEFANIA: 3/3/2021    Tremayne Galarza is a 61 year old male who is being evaluated via a billable telephone visit.        Tremayne Galarza is a very pleasant 61 year old male with visit today for postoperative follow up.     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     He developed two small boils on his buttock and hip with cultures showing staphylococcus aureus and he was treated with Augmentin.    MRI Pelvis 20:  1. 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  2. Near complete resolution of the previously seen posterior perianal  abscess, with minimal residual dilation of the superior fistula tract.  3. Skin and subcutaneous soft tissue thickening and enhancement in the  anterior left perineal region corresponding to the clinically visible  lesion.    Colonoscopy in  was normal.    He saw dermatology and was treated for folliculitis     Tremayne has had a large posterior fistula that is intersphincteric with a large internal orifice. This has gotten worse despite a seton. An Magnetic Resonance Imaging (MRI) 20 showed complexity and a horseshoe component to the right.    He is now status post examination under anesthesia with partial fistulotomy, debridement of the fistula and placement of a seton on 20 with a large internal orifice, extension to the deep post-anal space and horseshoeing to the right    I saw him in clinic on 20 and wanted to give the internal cavity some more time to heal before removing one of the setons and to get an MRI to ensure there was no branching and internal orificehas improved.    MR Pelvis 20:  Redemonstration of the posterior midline intersphincteric  fistula with seton in place which again bifurcates into superior  and  inferior component. Decreased enhancement and T2 hyperintensity of the  primary tract since prior, improving but persistent active  inflammation. No new fistula formation.    I met with Tremayne in September and removed one of his setons at that time.    MR Pelvis 10/15/20:  Impression:   1. Redemonstration of posterior midline intersphincteric fistula with  seton in place, showing ramifications and slightly decreased  horseshoeing component on the right side with persistent inflammatory  change.  2. No new fistula or abscess.    MR Pelvis 12/9/20:  Impression:   1. Redemonstration of posterior midline intersphincteric fistula with  seton in place with minimally decreased horseshoeing component on the  right side with persistent inflammatory changes.  2. No new fistula or abscess is identified.    He is now status post examination under anesthesia with completion fistulotomy on 1/15/21.    Interval History:  Overall he is doing quite well.  He does have still a small amount of drainage which is improving but not gone.  No fecal incontinence.  No pain.  Overall pleased with current results.    Assessment/Plan: Status post completion fistulotomy January 15, 2021.  Doing well.  I will check in with him in another few weeks to be sure that the fistulotomy site is healed.  If he continues to have drainage I would like for him to be seen in our office to ensure that the process is fully addressed.    Total time was 15 minutes, over 50% was spent counseling the patient. This is a postoperative visit.       Medical history:  Past Medical History:   Diagnosis Date     Chronic ischemic heart disease 9/4/2014    Overview:  3 stents to RCA 8/2014      Essential hypertension 2/11/2005    Overview:  LW Onset:  [id number]      Fistula      Hyperlipidemia 11/14/2005    Overview:  LW Onset:  [id number]      Perirectal abscess      Staph infection        Surgical history:  Past Surgical History:   Procedure Laterality Date     BIOPSY        CARDIAC SURGERY       COLONOSCOPY N/A 12/30/2015    Procedure: COLONOSCOPY;  Surgeon: Duane, William Charles, MD;  Location: [facility]     COLONOSCOPY WITH CO2 INSUFFLATION N/A 12/30/2015    Procedure: COLONOSCOPY WITH CO2 INSUFFLATION;  Surgeon: Duane, William Charles, MD;  Location: [facility]     EXAM UNDER ANESTHESIA ANUS N/A 1/10/2020    Procedure: EXAM UNDER ANESTHESIA, ANUS, inscision and drainage of perirectal abscess, partial fistulotomy,  seton placement;  Surgeon: Camila Calderon MD;  [facility]     EXAM UNDER ANESTHESIA ANUS N/A 6/19/2020    Procedure: Exam Under Anesthesia, Fistulotomy, Seton placement;  Surgeon: Camila Calderon MD;  [facility]     EXAM UNDER ANESTHESIA ANUS N/A 1/15/2021    Procedure: EXAM UNDER ANESTHESIA, ANUS, Completion fistulotomy;  Surgeon: Camila Calderon MD;  Location: [facility]     FISTULOTOMY RECTUM N/A 1/10/2020    Procedure: partial FISTULOTOMY, RECTUM;  Surgeon: Camila Calderon MD;  [facility]     PLACEMENT OF SETON RECTUM N/A 1/10/2020    Procedure: PLACEMENT, SETON STITCH;  Surgeon: Camila Calderon MD;  Location: [facility]       Problem list:    Patient Active Problem List    Diagnosis Date Noted     Staph infection 03/03/2020     Priority: Medium     History of nonmelanoma skin cancer 03/03/2020     Priority: Medium     History of dysplastic nevus 03/03/2020     Priority: Medium     Perianal abscess 01/09/2020     Priority: Medium     Added automatically from request for surgery [id number]       Anal fistula 01/09/2020     Priority: Medium     Added automatically from request for surgery [id number]       Verruca plantaris 04/11/2018     Priority: Medium     LUQ abdominal pain 03/27/2018     Priority: Medium     CAD (coronary artery disease) 08/05/2015     Priority: Medium     Status post coronary angiogram 03/26/2015     Priority: Medium     Nondependent alcohol abuse 02/03/2015     Priority: Medium     Chronic  ischemic heart disease 09/04/2014     Priority: Medium     Overview:   3 stents to RCA 8/2014       Postsurgical percutaneous transluminal coronary angioplasty status 09/04/2014     Priority: Medium     Benign neoplasm of colon 06/05/2013     Priority: Medium     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 Provider to review and confirm.       HTN, goal below 140/90 02/11/2005     Priority: Medium     Overview:   LW Onset:  15Nfs91       Obesity 02/11/2005     Priority: Medium     Overview:   LW Onset:  11Feb05       Allergic rhinitis 02/11/2005     Priority: Medium     Overview:   LW Onset:  11Feb05         Medications:  Current Outpatient Medications   Medication Sig Dispense Refill     aspirin (CHILDRENS ASPIRIN) 81 MG chewable tablet Take 81 mg by mouth       atenolol (TENORMIN) 25 MG tablet Take 1 tablet (25 mg) by mouth daily 90 tablet 3     ezetimibe (ZETIA) 10 MG tablet Take 1 tablet (10 mg) by mouth daily 90 tablet 2     fexofenadine (ALLEGRA) 60 MG tablet Take 0.5 tablets (30 mg) by mouth daily 60 tablet 3     mupirocin (BACTROBAN) 2 % external ointment Use 3 times per day for 3 weeks. Then the first week of every month for 6 months. 60 g 2     rosuvastatin (CRESTOR) 40 MG tablet Take 1 tablet (40 mg) by mouth daily 90 tablet 3     sildenafil (VIAGRA) 50 MG tablet Take 1 tablet (50 mg) by mouth daily as needed 10 tablet 0       Allergies:  No Known Allergies    Family history:  Family History   Problem Relation Age of Onset     Coronary Artery Disease Mother      Hypertension Mother      Cerebrovascular Disease Mother        Social history:  Social History     Tobacco Use     Smoking status: Never Smoker     Smokeless tobacco: Never Used   Substance Use Topics     Alcohol use: Yes     Comment: 2-3 drinks weekly    Marital status:  .      Nursing Notes:   Reina Roberts CMA  3/3/2021  5:43 PM  Signed  Chief Complaint   Patient presents with     Surgical Followup     Post-op follow up, DOS:01/15/2021       Vitals:    03/03/21 1741   Weight: 202 lb   Height: 6'       Body mass index is 27.4 kg/m .       MERA Tinoco MD  Colon and Rectal Surgery Staff  [facility]    This note was created using speech recognition software and may contain unintended word substitutions.

## 2021-03-03 ENCOUNTER — VIRTUAL VISIT (OUTPATIENT)
Dept: SURGERY | Facility: CLINIC | Age: 61
End: 2021-03-03
Payer: COMMERCIAL

## 2021-03-03 VITALS — WEIGHT: 202 LBS | BODY MASS INDEX: 27.36 KG/M2 | HEIGHT: 72 IN

## 2021-03-03 DIAGNOSIS — Z09 FOLLOW-UP EXAMINATION FOLLOWING SURGERY: Primary | ICD-10-CM

## 2021-03-03 PROCEDURE — 99024 POSTOP FOLLOW-UP VISIT: CPT | Performed by: COLON & RECTAL SURGERY

## 2021-03-03 ASSESSMENT — MIFFLIN-ST. JEOR: SCORE: 1769.27

## 2021-03-03 ASSESSMENT — PAIN SCALES - GENERAL: PAINLEVEL: NO PAIN (0)

## 2021-03-03 NOTE — LETTER
3/3/2021       RE: Tremayne Galarza  [address] [facility] [address]     Dear Colleague,    Thank you for referring your patient, Tremayne Galarza, to the [facility] COLON AND RECTAL SURGERY CLINIC [place] at [facility]. Please see a copy of my visit note below.    Colon and Rectal Surgery telephone note      RE: Tremayne Galarza  : 1960  STEFANIA: 3/3/2021    Tremayne Galarza is a 61 year old male who is being evaluated via a billable telephone visit.        Tremayne Galarza is a very pleasant 61 year old male with visit today for postoperative follow up.     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     He developed two small boils on his buttock and hip with cultures showing staphylococcus aureus and he was treated with Augmentin.    MRI Pelvis 20:  1. 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  2. Near complete resolution of the previously seen posterior perianal  abscess, with minimal residual dilation of the superior fistula tract.  3. Skin and subcutaneous soft tissue thickening and enhancement in the  anterior left perineal region corresponding to the clinically visible  lesion.    Colonoscopy in  was normal.    He saw dermatology and was treated for folliculitis     Tremayne has had a large posterior fistula that is intersphincteric with a large internal orifice. This has gotten worse despite a seton. An Magnetic Resonance Imaging (MRI) 20 showed complexity and a horseshoe component to the right.    He is now status post examination under anesthesia with partial fistulotomy, debridement of the fistula and placement of a seton on 20 with a large internal orifice, extension to the deep post-anal space and horseshoeing to the right    I saw him in clinic on 20 and wanted to  give the internal cavity some more time to heal before removing one of the setons and to get an MRI to ensure there was no branching and internal orificehas improved.    MR Pelvis 9/9/20:  Redemonstration of the posterior midline intersphincteric  fistula with seton in place which again bifurcates into superior and  inferior component. Decreased enhancement and T2 hyperintensity of the  primary tract since prior, improving but persistent active  inflammation. No new fistula formation.    I met with Tremayne in September and removed one of his setons at that time.    MR Pelvis 10/15/20:  Impression:   1. Redemonstration of posterior midline intersphincteric fistula with  seton in place, showing ramifications and slightly decreased  horseshoeing component on the right side with persistent inflammatory  change.  2. No new fistula or abscess.    MR Pelvis 12/9/20:  Impression:   1. Redemonstration of posterior midline intersphincteric fistula with  seton in place with minimally decreased horseshoeing component on the  right side with persistent inflammatory changes.  2. No new fistula or abscess is identified.    He is now status post examination under anesthesia with completion fistulotomy on 1/15/21.    Interval History:  Overall he is doing quite well.  He does have still a small amount of drainage which is improving but not gone.  No fecal incontinence.  No pain.  Overall pleased with current results.    Assessment/Plan: Status post completion fistulotomy January 15, 2021.  Doing well.  I will check in with him in another few weeks to be sure that the fistulotomy site is healed.  If he continues to have drainage I would like for him to be seen in our office to ensure that the process is fully addressed.    Total time was 15 minutes, over 50% was spent counseling the patient. This is a postoperative visit.     Medical history:  Past Medical History:   Diagnosis Date     Chronic ischemic heart disease 9/4/2014     Overview:  3 stents to RCA 8/2014      Essential hypertension 2/11/2005    Overview:  LW Onset:  [id number]      Fistula      Hyperlipidemia 11/14/2005    Overview:  LW Onset:  14Nov05      Perirectal abscess      Staph infection        Surgical history:  Past Surgical History:   Procedure Laterality Date     BIOPSY       CARDIAC SURGERY       COLONOSCOPY N/A 12/30/2015    Procedure: COLONOSCOPY;  Surgeon: Duane, William Charles, MD;  Location: [facility] OR     COLONOSCOPY WITH CO2 INSUFFLATION N/A 12/30/2015    Procedure: COLONOSCOPY WITH CO2 INSUFFLATION;  Surgeon: Duane, William Charles, MD;  Location: [facility] OR     EXAM UNDER ANESTHESIA ANUS N/A 1/10/2020    Procedure: EXAM UNDER ANESTHESIA, ANUS, inscision and drainage of perirectal abscess, partial fistulotomy,  seton placement;  Surgeon: Camila Calderon MD;  Location: [facility] OR     EXAM UNDER ANESTHESIA ANUS N/A 6/19/2020    Procedure: Exam Under Anesthesia, Fistulotomy, Seton placement;  Surgeon: Camila Calderon MD;  Location: [facility]     EXAM UNDER ANESTHESIA ANUS N/A 1/15/2021    Procedure: EXAM UNDER ANESTHESIA, ANUS, Completion fistulotomy;  Surgeon: Camila Calderon MD;  [facility]     FISTULOTOMY RECTUM N/A 1/10/2020    Procedure: partial FISTULOTOMY, RECTUM;  Surgeon: Camila Calderon MD;  Location: [facility]     PLACEMENT OF SETON RECTUM N/A 1/10/2020    Procedure: PLACEMENT, SETON STITCH;  Surgeon: Camila Calderon MD;  Location: [facility] OR       Problem list:    Patient Active Problem List    Diagnosis Date Noted     Staph infection 03/03/2020     Priority: Medium     History of nonmelanoma skin cancer 03/03/2020     Priority: Medium     History of dysplastic nevus 03/03/2020     Priority: Medium     Perianal abscess 01/09/2020     Priority: Medium     Added automatically from request for surgery [id number]       Anal fistula 01/09/2020     Priority: Medium     Added automatically from request for surgery [id number]        Verruca plantaris 04/11/2018     Priority: Medium     LUQ abdominal pain 03/27/2018     Priority: Medium     CAD (coronary artery disease) 08/05/2015     Priority: Medium     Status post coronary angiogram 03/26/2015     Priority: Medium     Nondependent alcohol abuse 02/03/2015     Priority: Medium     Chronic ischemic heart disease 09/04/2014     Priority: Medium     Overview:   3 stents to RCA 8/2014       Postsurgical percutaneous transluminal coronary angioplasty status 09/04/2014     Priority: Medium     Benign neoplasm of colon 06/05/2013     Priority: Medium     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 Provider to review and confirm.       HTN, goal below 140/90 02/11/2005     Priority: Medium     Overview:   LW Onset:  54Ljd66       Obesity 02/11/2005     Priority: Medium     Overview:   LW Onset:  11Feb05       Allergic rhinitis 02/11/2005     Priority: Medium     Overview:   LW Onset:  11Feb05         Medications:  Current Outpatient Medications   Medication Sig Dispense Refill     aspirin (CHILDRENS ASPIRIN) 81 MG chewable tablet Take 81 mg by mouth       atenolol (TENORMIN) 25 MG tablet Take 1 tablet (25 mg) by mouth daily 90 tablet 3     ezetimibe (ZETIA) 10 MG tablet Take 1 tablet (10 mg) by mouth daily 90 tablet 2     fexofenadine (ALLEGRA) 60 MG tablet Take 0.5 tablets (30 mg) by mouth daily 60 tablet 3     mupirocin (BACTROBAN) 2 % external ointment Use 3 times per day for 3 weeks. Then the first week of every month for 6 months. 60 g 2     rosuvastatin (CRESTOR) 40 MG tablet Take 1 tablet (40 mg) by mouth daily 90 tablet 3     sildenafil (VIAGRA) 50 MG tablet Take 1 tablet (50 mg) by mouth daily as needed 10 tablet 0       Allergies:  No Known Allergies    Family history:  Family History   Problem Relation Age of Onset      Coronary Artery Disease Mother      Hypertension Mother      Cerebrovascular Disease Mother        Social history:  Social History     Tobacco Use     Smoking status: Never Smoker     Smokeless tobacco: Never Used   Substance Use Topics     Alcohol use: Yes     Comment: 2-3 drinks weekly    Marital status: .      Nursing Notes:   Reina Roberts CMA  3/3/2021  5:43 PM  Signed  Chief Complaint   Patient presents with     Surgical Followup     Post-op follow up, DOS:01/15/2021       Vitals:    03/03/21 1741   Weight: 202 lb   Height: 6'       Body mass index is 27.4 kg/m .       MERA Tinoco MD  Colon and Rectal Surgery Staff  [facility]    This note was created using speech recognition software and may contain unintended word substitutions.

## 2021-03-03 NOTE — NURSING NOTE
Chief Complaint   Patient presents with     Surgical Followup     Post-op follow up, DOS:01/15/2021       Vitals:    03/03/21 1741   Weight: 202 lb   Height: 6'       Body mass index is 27.4 kg/m .       Reina Banks, CMA

## 2021-03-30 ENCOUNTER — OFFICE VISIT (OUTPATIENT)
Dept: DERMATOLOGY | Facility: CLINIC | Age: 61
End: 2021-03-30
Payer: COMMERCIAL

## 2021-03-30 DIAGNOSIS — D48.5 NEOPLASM OF UNCERTAIN BEHAVIOR OF SKIN: ICD-10-CM

## 2021-03-30 DIAGNOSIS — Z85.828 HISTORY OF NONMELANOMA SKIN CANCER: Primary | ICD-10-CM

## 2021-03-30 DIAGNOSIS — Z86.018 HISTORY OF DYSPLASTIC NEVUS: ICD-10-CM

## 2021-03-30 PROCEDURE — 11103 TANGNTL BX SKIN EA SEP/ADDL: CPT | Performed by: DERMATOLOGY

## 2021-03-30 PROCEDURE — 99213 OFFICE O/P EST LOW 20 MIN: CPT | Mod: 25 | Performed by: DERMATOLOGY

## 2021-03-30 PROCEDURE — 88305 TISSUE EXAM BY PATHOLOGIST: CPT | Performed by: DERMATOLOGY

## 2021-03-30 PROCEDURE — 11102 TANGNTL BX SKIN SINGLE LES: CPT | Performed by: DERMATOLOGY

## 2021-03-30 NOTE — LETTER
3/30/2021         RE: Tremayne Galarza  [address] [facility] [facility] [address]        Dear Colleague,    Thank you for referring your patient, Tremayne Galarza, to the [facility]. Please see a copy of my visit note below.    [facility] Dermatology Note  Encounter Date: Mar 30, 2021  Office Visit     Dermatology Problem List:  1. NMSC  -BCC, upper chest, s/p ED&C 8/4/2016  -BCC, base of neck, s/p excision 8/4/2016  2. Compound nevus with severe dysplasia, left chest  -s/p excision 1/15/2016  3. Eczema, left shin  -Previous Tx:  triamcinolone (initiated 1/4/2016)  4. Subcutaneous nodules  -current tx: doxycycline, mupirocin, BPO wash, Bactroban  -s/p expression 03/03/20  -s/p bacterial culture 03/03/20  5. AKs  -s/p cryotherapy  6. NUB - left ventral forearm, left posterior thigh - bx 3/30/21    ____________________________________________    Assessment & Plan:    # History of nonmelanoma skin cancer, no clincial evidence of recurrence.  - Sun protection: Counseled SPF30+ sunscreen, UPF clothing, sun avoidance, tanning bed avoidance.     # History of DN: No evidence of recurrence. While these are benign, they are a marker for increased melanoma risk.  - Recommended yearly skin checks.    # Neoplasm of uncertain behavior on the left ventral forearm. The differential diagnosis includes HAK vs other.    - See procedure note.     # Neoplasm of uncertain behavior on the left posterior thigh. The differential diagnosis includes congenital nevus vs other.    - See procedure note     Procedures Performed:   -Shave biopsy: Location(s) left ventral forearm, left posterior thigh.  After discussion of benefits and risks including but not limited to bleeding/bruising, pain/swelling, infection, scar, incomplete removal, nerve damage/numbness, recurrence, and non-diagnostic biopsy, written consent, verbal consent and photographs were obtained. Time-out was performed. The area was cleaned  with isopropyl alcohol. 0.5mL of 1% lidocaine with epinephrine was injected to obtain adequate anesthesia of each lesion. Shave biopsy was performed. Hemostasis was achieved with aluminium chloride. Vaseline and a sterile dressing were applied. The patient tolerated the procedure and no complications were noted. The patient was provided with verbal and written post care instructions.         Follow-up: pending path results    Staff and Scribe:     Scribe Disclosure:   I, Jani Hutchison, am serving as a scribe to document services personally performed by this physician, Dr. Radha Orona, based on data collection and the provider's statements to me.     Provider Disclosure:   The documentation recorded by the scribe accurately reflects the services I personally performed and the decisions made by me.    Radha Orona MD    Department of Dermatology  [facility]: Phone: [phone], Fax:[phone]  [facility]: Phone: [phone], Fax: [phone]      ____________________________________________    CC: Skin Check (annual skin check, no myriam CLAUDIO, personal hx of NMSC, )    HPI:  Mr. Tremayne Galarza is a(n) 61 year old male who presents today as a return patient for a skin check.    Last skin check on 03/03/2020. At that time, three AKs were treated with cryo. Patient also started on Bactroban TID and mupirocin ointment for a subcutaneous nodule on the left neck. Tomasa reported in a virtual visit on 03/31/2020 that this lesion had resolved.    Today, he has a spot on the left forearm he would like checked. Nothing bleeding, crusting, or changing.     Patient is otherwise feeling well, without additional skin concerns.    Labs Reviewed:  N/A    Physical Exam:  Vitals: There were no vitals taken for this visit.  SKIN: Total skin excluding the undergarment areas was performed. The exam  included the head/face, neck, both arms, chest, back, abdomen, both legs, digits and/or nails.   - Left Ventral Forearm: 5 mm cutaneous horn  - No re pigmentation at the site of previous DN  - There are well healed surgical scars without erythema, nodularity or telangiectasias at the site of previous NMSC.  - Left posterior thigh: 6 mm pigmented macule with erythema and asymmetric color  - No other lesions of concern on areas examined.     Medications:  Current Outpatient Medications   Medication     aspirin (CHILDRENS ASPIRIN) 81 MG chewable tablet     atenolol (TENORMIN) 25 MG tablet     ezetimibe (ZETIA) 10 MG tablet     fexofenadine (ALLEGRA) 60 MG tablet     mupirocin (BACTROBAN) 2 % external ointment     rosuvastatin (CRESTOR) 40 MG tablet     sildenafil (VIAGRA) 50 MG tablet     No current facility-administered medications for this visit.       Past Medical History:   Patient Active Problem List   Diagnosis     Chronic ischemic heart disease     Benign neoplasm of colon     Hyperlipidemia with target LDL less than 100     HTN, goal below 140/90     Lesion of plantar nerve     Obesity     Allergic rhinitis     Postsurgical percutaneous transluminal coronary angioplasty status     Nondependent alcohol abuse     Status post coronary angiogram     CAD (coronary artery disease)     LUQ abdominal pain     Verruca plantaris     Perianal abscess     Anal fistula     Staph infection     History of nonmelanoma skin cancer     History of dysplastic nevus     Past Medical History:   Diagnosis Date     Chronic ischemic heart disease 9/4/2014    Overview:  3 stents to RCA 8/2014      Essential hypertension 2/11/2005    Overview:  LW Onset:  38Ojp45      Fistula      Hyperlipidemia 11/14/2005    Overview:  LW Onset:  11Dci52      Perirectal abscess      Staph infection         CC No referring provider defined for this encounter. on close of this encounter.      Again, thank you for allowing me to participate in the care of  your patient.        Sincerely,        Radha Orona MD

## 2021-03-30 NOTE — NURSING NOTE
Tremayne Galarza's goals for this visit include:   Chief Complaint   Patient presents with     Skin Check     annual skin check, no myriam CLAUDIO, personal hx of NMSC,        He requests these members of his care team be copied on today's visit information:     PCP: [place]    Referring Provider:  No referring provider defined for this encounter.    There were no vitals taken for this visit.    Do you need any medication refills at today's visit? No    Karin Tobias LPN

## 2021-03-30 NOTE — PATIENT INSTRUCTIONS

## 2021-03-30 NOTE — PROGRESS NOTES
[facility] Dermatology Note  Encounter Date: Mar 30, 2021  Office Visit     Dermatology Problem List:  1. NMSC  -BCC, upper chest, s/p ED&C 8/4/2016  -BCC, base of neck, s/p excision 8/4/2016  2. Compound nevus with severe dysplasia, left chest  -s/p excision 1/15/2016  3. Eczema, left shin  -Previous Tx:  triamcinolone (initiated 1/4/2016)  4. Subcutaneous nodules  -current tx: doxycycline, mupirocin, BPO wash, Bactroban  -s/p expression 03/03/20  -s/p bacterial culture 03/03/20  5. AKs  -s/p cryotherapy  6. NUB - left ventral forearm, left posterior thigh - bx 3/30/21    ____________________________________________    Assessment & Plan:    # History of nonmelanoma skin cancer, no clincial evidence of recurrence.  - Sun protection: Counseled SPF30+ sunscreen, UPF clothing, sun avoidance, tanning bed avoidance.     # History of DN: No evidence of recurrence. While these are benign, they are a marker for increased melanoma risk.  - Recommended yearly skin checks.    # Neoplasm of uncertain behavior on the left ventral forearm. The differential diagnosis includes HAK vs other.    - See procedure note.     # Neoplasm of uncertain behavior on the left posterior thigh. The differential diagnosis includes congenital nevus vs other.    - See procedure note     Procedures Performed:   -Shave biopsy: Location(s) left ventral forearm, left posterior thigh.  After discussion of benefits and risks including but not limited to bleeding/bruising, pain/swelling, infection, scar, incomplete removal, nerve damage/numbness, recurrence, and non-diagnostic biopsy, written consent, verbal consent and photographs were obtained. Time-out was performed. The area was cleaned with isopropyl alcohol. 0.5mL of 1% lidocaine with epinephrine was injected to obtain adequate anesthesia of each lesion. Shave biopsy was performed. Hemostasis was achieved with aluminium chloride. Vaseline and a sterile dressing were applied. The  patient tolerated the procedure and no complications were noted. The patient was provided with verbal and written post care instructions.         Follow-up: pending path results    Staff and Scribe:     Scribe Disclosure:   I, Jani Hutchison, am serving as a scribe to document services personally performed by this physician, Dr. Radha Orona, based on data collection and the provider's statements to me.     Provider Disclosure:   The documentation recorded by the scribe accurately reflects the services I personally performed and the decisions made by me.    Radha Orona MD    Department of Dermatology  [facility]: Phone: [phone], Fax:[phone]  [facility]: Phone: [phone], Fax: [phone]      ____________________________________________    CC: Skin Check (annual skin check, no pablitoe nakia SC, personal hx of NMSC, )    HPI:  Mr. Tremayne Galarza is a(n) 61 year old male who presents today as a return patient for a skin check.    Last skin check on 03/03/2020. At that time, three AKs were treated with cryo. Patient also started on Bactroban TID and mupirocin ointment for a subcutaneous nodule on the left neck. Tomasa reported in a virtual visit on 03/31/2020 that this lesion had resolved.    Today, he has a spot on the left forearm he would like checked. Nothing bleeding, crusting, or changing.     Patient is otherwise feeling well, without additional skin concerns.    Labs Reviewed:  N/A    Physical Exam:  Vitals: There were no vitals taken for this visit.  SKIN: Total skin excluding the undergarment areas was performed. The exam included the head/face, neck, both arms, chest, back, abdomen, both legs, digits and/or nails.   - Left Ventral Forearm: 5 mm cutaneous horn  - No re pigmentation at the site of previous DN  - There are well healed surgical scars without erythema,  nodularity or telangiectasias at the site of previous NMSC.  - Left posterior thigh: 6 mm pigmented macule with erythema and asymmetric color  - No other lesions of concern on areas examined.     Medications:  Current Outpatient Medications   Medication     aspirin (CHILDRENS ASPIRIN) 81 MG chewable tablet     atenolol (TENORMIN) 25 MG tablet     ezetimibe (ZETIA) 10 MG tablet     fexofenadine (ALLEGRA) 60 MG tablet     mupirocin (BACTROBAN) 2 % external ointment     rosuvastatin (CRESTOR) 40 MG tablet     sildenafil (VIAGRA) 50 MG tablet     No current facility-administered medications for this visit.       Past Medical History:   Patient Active Problem List   Diagnosis     Chronic ischemic heart disease     Benign neoplasm of colon     Hyperlipidemia with target LDL less than 100     HTN, goal below 140/90     Lesion of plantar nerve     Obesity     Allergic rhinitis     Postsurgical percutaneous transluminal coronary angioplasty status     Nondependent alcohol abuse     Status post coronary angiogram     CAD (coronary artery disease)     LUQ abdominal pain     Verruca plantaris     Perianal abscess     Anal fistula     Staph infection     History of nonmelanoma skin cancer     History of dysplastic nevus     Past Medical History:   Diagnosis Date     Chronic ischemic heart disease 9/4/2014    Overview:  3 stents to RCA 8/2014      Essential hypertension 2/11/2005    Overview:  LW Onset:  23Irg34      Fistula      Hyperlipidemia 11/14/2005    Overview:  LW Onset:  14Nov05      Perirectal abscess      Staph infection         CC No referring provider defined for this encounter. on close of this encounter.

## 2021-03-30 NOTE — NURSING NOTE
The following medication was given:     MEDICATION:  Lidocaine with epinephrine 1% 1:[id number]  ROUTE: SQ  SITE: see procedure note  DOSE: 1cc  LOT #: -[id number]  : Malcom  EXPIRATION DATE: 2/2022  NDC#: 9348-5508-93   Was there drug waste? 1cc  Multi-dose vial: Yes    Kandace Arevalo LPN  March 30, 2021

## 2021-04-04 LAB — COPATH REPORT: NORMAL

## 2021-04-07 DIAGNOSIS — I25.10 CORONARY ARTERY DISEASE INVOLVING NATIVE CORONARY ARTERY OF NATIVE HEART WITHOUT ANGINA PECTORIS: ICD-10-CM

## 2021-04-12 RX ORDER — ATENOLOL 25 MG/1
25 TABLET ORAL DAILY
Qty: 90 TABLET | Refills: 0 | Status: SHIPPED | OUTPATIENT
Start: 2021-04-12 | End: 2021-07-15

## 2021-04-12 NOTE — TELEPHONE ENCOUNTER
ATENOLOL 25MG TABS    Last Written Prescription Date:  4/6/2020  Last Fill Quantity: 90,   # refills: 3  Last Office Visit : 4/6/2020  Future Office visit:  None  90 Tabs sent to pharm 4/12/2021    Estrellita Joya RN  Central Triage Red Flags/Med Refills

## 2021-04-15 ENCOUNTER — TELEPHONE (OUTPATIENT)
Dept: DERMATOLOGY | Facility: CLINIC | Age: 61
End: 2021-04-15

## 2021-04-15 NOTE — TELEPHONE ENCOUNTER
Excision previsit information                                                    Tremayne Galarza is a 61 year old male     Patient is being referred to Dr. Castro  Referring Provider: Dr. Orona  For treatment of: squamous cell carcinoma in situ  Site(s): left ventral forearm  Previous Records received:  YES    Medication & Allergy Information                                                    CURRENT MEDICATIONS:   Current Outpatient Medications   Medication Sig Dispense Refill     aspirin (CHILDRENS ASPIRIN) 81 MG chewable tablet Take 81 mg by mouth       atenolol (TENORMIN) 25 MG tablet Take 1 tablet (25 mg) by mouth daily 90 tablet 0     ezetimibe (ZETIA) 10 MG tablet Take 1 tablet (10 mg) by mouth daily 90 tablet 2     fexofenadine (ALLEGRA) 60 MG tablet Take 0.5 tablets (30 mg) by mouth daily 60 tablet 3     rosuvastatin (CRESTOR) 40 MG tablet Take 1 tablet (40 mg) by mouth daily 90 tablet 3     sildenafil (VIAGRA) 50 MG tablet Take 1 tablet (50 mg) by mouth daily as needed 10 tablet 0       Do you take the following medications:  Aspirin:  YES  Ibuprofen/Advil/Motrin, Aleve/Naproxen:   NO  Coumadin, Eliquis, Pradaxa, Xarelto:   NO.  Vitamin E:   NO  Plavix:   NO    Sherwin's wart: NO   Garlic supplement:  NO   Fish Oil: NO  Antibiotic Prophylaxis:  NO    Do you have an ALLERGIC REACTION to any medications:  NO   Patient has no known allergies.    Past Medical History                                                    Do you currently or have you previously had any of the following conditions:       HIV/AIDS:  NO    Hepatitis:  NO    Suppressed Immune System:  NO    Autoimmune disorder (eg RA, Lupus):  NO    Prolonged bleeding or bleeding disorder:  NO    Fever blisters/herpes, cold sores, shingles:  NO    Kidney disease:  NO  Creatinine   Date Value Ref Range Status   06/11/2020 0.80 0.66 - 1.25 mg/dL Final   ]    Pacemaker or Defibrillator:  NO.      History of artificial or heart valve replacement:  NO.       Endocarditis/Rheumatic Fever: NO    Organ transplant: NO.      Joint replacement within past 2 years: NO    Previous prosthetic joint infection: N/A    Diabetes: NO    Pregnant:: N/A    Keloids or Abnormal scars: NO    Mobility device (wheelchair, transfer difficulty): NO    Tobacco use:  NO.    History   Smoking Status     Never Smoker   Smokeless Tobacco     Never Used     Nitza Zuniga RN

## 2021-04-24 ENCOUNTER — HEALTH MAINTENANCE LETTER (OUTPATIENT)
Age: 61
End: 2021-04-24

## 2021-05-11 NOTE — PROGRESS NOTES
Colon and Rectal Surgery telephone note      RE: Tremayne Galarza  : 1960  STEFANIA: 2021    Tremayne Galarza is a very pleasant 61 year old male with visit today for postoperative follow up.     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     He developed two small boils on his buttock and hip with cultures showing staphylococcus aureus and he was treated with Augmentin.    MRI Pelvis 20:  1. 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  2. Near complete resolution of the previously seen posterior perianal  abscess, with minimal residual dilation of the superior fistula tract.  3. Skin and subcutaneous soft tissue thickening and enhancement in the  anterior left perineal region corresponding to the clinically visible  lesion.    Colonoscopy in  was normal.    He saw dermatology and was treated for folliculitis     Tremayne has had a large posterior fistula that is intersphincteric with a large internal orifice. This has gotten worse despite a seton. An Magnetic Resonance Imaging (MRI) 20 showed complexity and a horseshoe component to the right.    He is now status post examination under anesthesia with partial fistulotomy, debridement of the fistula and placement of a seton on 20 with a large internal orifice, extension to the deep post-anal space and horseshoeing to the right    I saw him in clinic on 20 and wanted to give the internal cavity some more time to heal before removing one of the setons and to get an MRI to ensure there was no branching and internal orificehas improved.    MR Pelvis 20: Redemonstration of the posterior midline intersphincteric fistula with seton in place which again bifurcates into superior and inferior component. Decreased enhancement and T2 hyperintensity of the primary tract since prior, improving  but persistent active inflammation. No new fistula formation.    I met with Tremayne in September and removed one of his setons at that time.    MR Pelvis 10/15/20: 1. Redemonstration of posterior midline intersphincteric fistula with seton in place, showing ramifications and slightly decreased horseshoeing component on the right side with persistent inflammatory change. 2. No new fistula or abscess.    MR Pelvis 12/9/20: 1. Redemonstration of posterior midline intersphincteric fistula with seton in place with minimally decreased horseshoeing component on the right side with persistent inflammatory changes. 2. No new fistula or abscess is identified.    He is now status post examination under anesthesia with completion fistulotomy on 1/15/21.    I had a virtual visit with him two months ago with a small amount of drainage and I recommended that he be seen in clinic if not fully healed in a few weeks.    Interval History: Tremayne is overall doing okay. He has some difficulty with seepage of stool. The fecal incontinence is not full bowel movements but it does happen often. His bowel movements are normal consistency but things worsen with looser bowel movements. Has tried some fiber.    Assessment/Plan: Status post completion fistulotomy January 15, 2021.  Seepage and small fecal incontinence of small amounts of stool.  1- will try fiber and complete bowel diary.  2 - If symptoms are persistent despite conservative medical therapy, consider Sacral Nerve Stimulation with bowel diary. We discussed what this might entail with a test procedure and possibly implant.    15 minutes spent on the date of the encounter doing chart review, history and exam, documentation and further activities as noted above.     Medical history:  Past Medical History:   Diagnosis Date     Chronic ischemic heart disease 9/4/2014    Overview:  3 stents to RCA 8/2014      Essential hypertension 2/11/2005    Overview:  LW Onset:  [id number]      Fistula       Hyperlipidemia 11/14/2005    Overview:  LW Onset:  [id number]      Perirectal abscess      Staph infection        Surgical history:  Past Surgical History:   Procedure Laterality Date     BIOPSY       CARDIAC SURGERY       COLONOSCOPY N/A 12/30/2015    Procedure: COLONOSCOPY;  Surgeon: Duane, William Charles, MD;  Location: [facility]     COLONOSCOPY WITH CO2 INSUFFLATION N/A 12/30/2015    Procedure: COLONOSCOPY WITH CO2 INSUFFLATION;  Surgeon: Duane, William Charles, MD;  Location: [facility]     EXAM UNDER ANESTHESIA ANUS N/A 1/10/2020    Procedure: EXAM UNDER ANESTHESIA, ANUS, inscision and drainage of perirectal abscess, partial fistulotomy,  seton placement;  Surgeon: Camila Calderon MD;  [facility]     EXAM UNDER ANESTHESIA ANUS N/A 6/19/2020    Procedure: Exam Under Anesthesia, Fistulotomy, Seton placement;  Surgeon: Camila Calderon MD;  [facility]     EXAM UNDER ANESTHESIA ANUS N/A 1/15/2021    Procedure: EXAM UNDER ANESTHESIA, ANUS, Completion fistulotomy;  Surgeon: Camila Calderon MD;  [facility]     FISTULOTOMY RECTUM N/A 1/10/2020    Procedure: partial FISTULOTOMY, RECTUM;  Surgeon: Camila Calderon MD;  [facility]     PLACEMENT OF SETON RECTUM N/A 1/10/2020    Procedure: PLACEMENT, SETON STITCH;  Surgeon: Camila Calderon MD;  [facility]       Problem list:    Patient Active Problem List    Diagnosis Date Noted     Staph infection 03/03/2020     Priority: Medium     History of nonmelanoma skin cancer 03/03/2020     Priority: Medium     History of dysplastic nevus 03/03/2020     Priority: Medium     Perianal abscess 01/09/2020     Priority: Medium     Added automatically from request for surgery [id number]       Anal fistula 01/09/2020     Priority: Medium     Added automatically from request for surgery [id number]       Verruca plantaris 04/11/2018     Priority: Medium     LUQ abdominal pain 03/27/2018     Priority: Medium     CAD  (coronary artery disease) 08/05/2015     Priority: Medium     Status post coronary angiogram 03/26/2015     Priority: Medium     Nondependent alcohol abuse 02/03/2015     Priority: Medium     Chronic ischemic heart disease 09/04/2014     Priority: Medium     Overview:   3 stents to RCA 8/2014       Postsurgical percutaneous transluminal coronary angioplasty status 09/04/2014     Priority: Medium     Benign neoplasm of colon 06/05/2013     Priority: Medium     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 Provider to review and confirm.       HTN, goal below 140/90 02/11/2005     Priority: Medium     Overview:   LW Onset:  48Spe95       Obesity 02/11/2005     Priority: Medium     Overview:   LW Onset:  11Feb05       Allergic rhinitis 02/11/2005     Priority: Medium     Overview:   LW Onset:  11Feb05         Medications:  Current Outpatient Medications   Medication Sig Dispense Refill     aspirin (CHILDRENS ASPIRIN) 81 MG chewable tablet Take 81 mg by mouth       atenolol (TENORMIN) 25 MG tablet Take 1 tablet (25 mg) by mouth daily 90 tablet 0     ezetimibe (ZETIA) 10 MG tablet Take 1 tablet (10 mg) by mouth daily 90 tablet 2     fexofenadine (ALLEGRA) 60 MG tablet Take 0.5 tablets (30 mg) by mouth daily 60 tablet 3     sildenafil (VIAGRA) 50 MG tablet Take 1 tablet (50 mg) by mouth daily as needed 10 tablet 0     rosuvastatin (CRESTOR) 40 MG tablet TAKE ONE TABLET BY MOUTH ONCE DAILY 90 tablet 0       Allergies:  No Known Allergies    Family history:  Family History   Problem Relation Age of Onset     Coronary Artery Disease Mother      Hypertension Mother      Cerebrovascular Disease Mother        Social history:  Social History     Tobacco Use     Smoking status: Never Smoker     Smokeless tobacco: Never Used   Substance Use Topics     Alcohol use:  Yes     Comment: 2-3 drinks weekly    Marital status: .      Nursing Notes:   Breanne Amezcua  5/12/2021  4:56 PM  Signed  Chief Complaint   Patient presents with     RECHECK     Check surgical site       Vitals:    05/12/21 1653   BP: 136/79   BP Location: Left arm   Patient Position: Sitting   Cuff Size: Adult Regular   Pulse: 55   SpO2: 100%   Weight: 206 lb 14.4 oz   Height: 6'       Body mass index is 28.06 kg/m .    Breanne Amezcua MA           Physical Examination:  BP (!) 144/80 (BP Location: Left arm, Patient Position: Sitting, Cuff Size: Adult Regular)   Pulse 56   Ht 6'   Wt 210 lb 3.2 oz   SpO2 96%   BMI 28.51 kg/m    General: alert, oriented, in no acute distress, sitting comfortably  Perianal external examination:  Surgical site fully healed.  Digital rectal examination: Distal internal anal sphincter with posterior defect. The external anal sphincter and remaining internal anal sphincter in place with good tone.       Camila Calderon MD  Colon and Rectal Surgery Staff  [facility]    This note was created using speech recognition software and may contain unintended word substitutions.

## 2021-05-12 ENCOUNTER — OFFICE VISIT (OUTPATIENT)
Dept: SURGERY | Facility: CLINIC | Age: 61
End: 2021-05-12
Payer: COMMERCIAL

## 2021-05-12 VITALS
BODY MASS INDEX: 28.02 KG/M2 | WEIGHT: 206.9 LBS | SYSTOLIC BLOOD PRESSURE: 136 MMHG | OXYGEN SATURATION: 100 % | HEIGHT: 72 IN | DIASTOLIC BLOOD PRESSURE: 79 MMHG | HEART RATE: 55 BPM

## 2021-05-12 DIAGNOSIS — R15.1 FECAL SMEARING: Primary | ICD-10-CM

## 2021-05-12 DIAGNOSIS — K60.30 ANAL FISTULA: ICD-10-CM

## 2021-05-12 DIAGNOSIS — E78.00 HYPERCHOLESTEREMIA: ICD-10-CM

## 2021-05-12 PROCEDURE — 99212 OFFICE O/P EST SF 10 MIN: CPT | Performed by: COLON & RECTAL SURGERY

## 2021-05-12 ASSESSMENT — MIFFLIN-ST. JEOR: SCORE: 1791.49

## 2021-05-12 ASSESSMENT — PAIN SCALES - GENERAL: PAINLEVEL: NO PAIN (0)

## 2021-05-12 NOTE — NURSING NOTE
Chief Complaint   Patient presents with     RECHECK     Check surgical site       Vitals:    05/12/21 1653   BP: 136/79   BP Location: Left arm   Patient Position: Sitting   Cuff Size: Adult Regular   Pulse: 55   SpO2: 100%   Weight: 206 lb 14.4 oz   Height: 6'       Body mass index is 28.06 kg/m .    Breanne Amezcua MA

## 2021-05-12 NOTE — PATIENT INSTRUCTIONS
Follow up:    Please call with any questions or concerns regarding your clinic visit today.    It is a pleasure being involved in your health care.    Contacts post-consultation depending on your need:    Radiology Appointments [phone]    Schedule Clinic Appointments [phone] # 1   M-F 7:30 - 5 pm    [facility] [phone]    Clinic Fax Number [phone]    Surgery Scheduling [phone]    My Chart is available 24 hours a day and is a secure way to access your records and communicate with your care team.  I strongly recommend signing up if you haven't already done so, if you are comfortable with computers.  If you would like to inquire about this or are having problems with My Chart access, you may call [phone] or go online at [email or web address].  Please allow at least 24 hours for a response and extra time on weekends and Holidays.

## 2021-05-12 NOTE — LETTER
2021       RE: Tremayne Galarza  [address] [address]     Dear Colleague,    Thank you for referring your patient, Tremayne Galarza, to the [facility] COLON AND RECTAL SURGERY CLINIC [place] at [facility]. Please see a copy of my visit note below.    Colon and Rectal Surgery telephone note      RE: Tremayne Galarza  : 1960  STEFANIA: 2021    Tremayne Galarza is a very pleasant 61 year old male with visit today for postoperative follow up.     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     He developed two small boils on his buttock and hip with cultures showing staphylococcus aureus and he was treated with Augmentin.    MRI Pelvis 20:  1. 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  2. Near complete resolution of the previously seen posterior perianal  abscess, with minimal residual dilation of the superior fistula tract.  3. Skin and subcutaneous soft tissue thickening and enhancement in the  anterior left perineal region corresponding to the clinically visible  lesion.    Colonoscopy in  was normal.    He saw dermatology and was treated for folliculitis     Tremayne has had a large posterior fistula that is intersphincteric with a large internal orifice. This has gotten worse despite a seton. An Magnetic Resonance Imaging (MRI) 20 showed complexity and a horseshoe component to the right.    He is now status post examination under anesthesia with partial fistulotomy, debridement of the fistula and placement of a seton on 20 with a large internal orifice, extension to the deep post-anal space and horseshoeing to the right    I saw him in clinic on 20 and wanted to give the internal cavity some more time to heal before removing one of the setons and to get an  MRI to ensure there was no branching and internal orificehas improved.    MR Pelvis 9/9/20: Redemonstration of the posterior midline intersphincteric fistula with seton in place which again bifurcates into superior and inferior component. Decreased enhancement and T2 hyperintensity of the primary tract since prior, improving but persistent active inflammation. No new fistula formation.    I met with Tremayne in September and removed one of his setons at that time.    MR Pelvis 10/15/20: 1. Redemonstration of posterior midline intersphincteric fistula with seton in place, showing ramifications and slightly decreased horseshoeing component on the right side with persistent inflammatory change. 2. No new fistula or abscess.    MR Pelvis 12/9/20: 1. Redemonstration of posterior midline intersphincteric fistula with seton in place with minimally decreased horseshoeing component on the right side with persistent inflammatory changes. 2. No new fistula or abscess is identified.    He is now status post examination under anesthesia with completion fistulotomy on 1/15/21.    I had a virtual visit with him two months ago with a small amount of drainage and I recommended that he be seen in clinic if not fully healed in a few weeks.    Interval History: Tremayne is overall doing okay. He has some difficulty with seepage of stool. The fecal incontinence is not full bowel movements but it does happen often. His bowel movements are normal consistency but things worsen with looser bowel movements. Has tried some fiber.    Assessment/Plan: Status post completion fistulotomy January 15, 2021.  Seepage and small fecal incontinence of small amounts of stool.  1- will try fiber and complete bowel diary.  2 - If symptoms are persistent despite conservative medical therapy, consider Sacral Nerve Stimulation with bowel diary. We discussed what this might entail with a test procedure and possibly implant.    15 minutes spent on the date of the  encounter doing chart review, history and exam, documentation and further activities as noted above.     Medical history:  Past Medical History:   Diagnosis Date     Chronic ischemic heart disease 9/4/2014    Overview:  3 stents to RCA 8/2014      Essential hypertension 2/11/2005    Overview:  LW Onset:  [id number]      Fistula      Hyperlipidemia 11/14/2005    Overview:  LW Onset:  14Nov05      Perirectal abscess      Staph infection        Surgical history:  Past Surgical History:   Procedure Laterality Date     BIOPSY       CARDIAC SURGERY       COLONOSCOPY N/A 12/30/2015    Procedure: COLONOSCOPY;  Surgeon: Duane, William Charles, MD;  Location: [facility]     COLONOSCOPY WITH CO2 INSUFFLATION N/A 12/30/2015    Procedure: COLONOSCOPY WITH CO2 INSUFFLATION;  Surgeon: Duane, William Charles, MD;  Location: [facility]     EXAM UNDER ANESTHESIA ANUS N/A 1/10/2020    Procedure: EXAM UNDER ANESTHESIA, ANUS, inscision and drainage of perirectal abscess, partial fistulotomy,  seton placement;  Surgeon: Camila Calderon MD;  [facility]     EXAM UNDER ANESTHESIA ANUS N/A 6/19/2020    Procedure: Exam Under Anesthesia, Fistulotomy, Seton placement;  Surgeon: Camila Calderon MD;  [facility]     EXAM UNDER ANESTHESIA ANUS N/A 1/15/2021    Procedure: EXAM UNDER ANESTHESIA, ANUS, Completion fistulotomy;  Surgeon: Camila Calderon MD;  [facility]     FISTULOTOMY RECTUM N/A 1/10/2020    Procedure: partial FISTULOTOMY, RECTUM;  Surgeon: Camila Calderon MD;  [facility]     PLACEMENT OF SETON RECTUM N/A 1/10/2020    Procedure: PLACEMENT, SETON STITCH;  Surgeon: Camila Calderon MD;  [facility]       Problem list:    Patient Active Problem List    Diagnosis Date Noted     Staph infection 03/03/2020     Priority: Medium     History of nonmelanoma skin cancer 03/03/2020     Priority: Medium     History of dysplastic nevus 03/03/2020     Priority: Medium     Perianal  abscess 01/09/2020     Priority: Medium     Added automatically from request for surgery [id number]       Anal fistula 01/09/2020     Priority: Medium     Added automatically from request for surgery [id number]       Verruca plantaris 04/11/2018     Priority: Medium     LUQ abdominal pain 03/27/2018     Priority: Medium     CAD (coronary artery disease) 08/05/2015     Priority: Medium     Status post coronary angiogram 03/26/2015     Priority: Medium     Nondependent alcohol abuse 02/03/2015     Priority: Medium     Chronic ischemic heart disease 09/04/2014     Priority: Medium     Overview:   3 stents to RCA 8/2014       Postsurgical percutaneous transluminal coronary angioplasty status 09/04/2014     Priority: Medium     Benign neoplasm of colon 06/05/2013     Priority: Medium     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 Provider to review and confirm.       HTN, goal below 140/90 02/11/2005     Priority: Medium     Overview:   LW Onset:  [id number]       Obesity 02/11/2005     Priority: Medium     Overview:   LW Onset:  11Feb05       Allergic rhinitis 02/11/2005     Priority: Medium     Overview:   LW Onset:  11Feb05         Medications:  Current Outpatient Medications   Medication Sig Dispense Refill     aspirin (CHILDRENS ASPIRIN) 81 MG chewable tablet Take 81 mg by mouth       atenolol (TENORMIN) 25 MG tablet Take 1 tablet (25 mg) by mouth daily 90 tablet 0     ezetimibe (ZETIA) 10 MG tablet Take 1 tablet (10 mg) by mouth daily 90 tablet 2     fexofenadine (ALLEGRA) 60 MG tablet Take 0.5 tablets (30 mg) by mouth daily 60 tablet 3     sildenafil (VIAGRA) 50 MG tablet Take 1 tablet (50 mg) by mouth daily as needed 10 tablet 0     rosuvastatin (CRESTOR) 40 MG tablet TAKE ONE TABLET BY MOUTH ONCE DAILY 90 tablet 0       Allergies:  No Known  Allergies    Family history:  Family History   Problem Relation Age of Onset     Coronary Artery Disease Mother      Hypertension Mother      Cerebrovascular Disease Mother        Social history:  Social History     Tobacco Use     Smoking status: Never Smoker     Smokeless tobacco: Never Used   Substance Use Topics     Alcohol use: Yes     Comment: 2-3 drinks weekly    Marital status: .      Nursing Notes:   Breanne Amezcua  5/12/2021  4:56 PM  Signed  Chief Complaint   Patient presents with     RECHECK     Check surgical site       Vitals:    05/12/21 1653   BP: 136/79   BP Location: Left arm   Patient Position: Sitting   Cuff Size: Adult Regular   Pulse: 55   SpO2: 100%   Weight: 206 lb 14.4 oz   Height: 6'       Body mass index is 28.06 kg/m .    Breanne Amezcua MA      Physical Examination:  BP (!) 144/80 (BP Location: Left arm, Patient Position: Sitting, Cuff Size: Adult Regular)   Pulse 56   Ht 6'   Wt 210 lb 3.2 oz   SpO2 96%   BMI 28.51 kg/m    General: alert, oriented, in no acute distress, sitting comfortably  Perianal external examination:  Surgical site fully healed.  Digital rectal examination: Distal internal anal sphincter with posterior defect. The external anal sphincter and remaining internal anal sphincter in place with good tone.       Camila Calderon MD  Colon and Rectal Surgery Staff  [facility]    This note was created using speech recognition software and may contain unintended word substitutions.

## 2021-05-13 RX ORDER — ROSUVASTATIN CALCIUM 40 MG/1
TABLET, COATED ORAL
Qty: 90 TABLET | Refills: 0 | Status: SHIPPED | OUTPATIENT
Start: 2021-05-13 | End: 2021-08-11

## 2021-05-13 NOTE — TELEPHONE ENCOUNTER
ROSUVASTATIN CALCIUM 40MG TABS   Last Written Prescription Date:  4/6/20  Last Fill Quantity: 90,   # refills: 3  Last Office Visit : 4/6/20  Future Office visit:  None       Appt due, 90 day mariza refill sent to the pharmacy - including instructions for patient to call the clinic and schedule an appointment.        06/25/20  1104    *

## 2021-06-02 ENCOUNTER — OFFICE VISIT (OUTPATIENT)
Dept: DERMATOLOGY | Facility: CLINIC | Age: 61
End: 2021-06-02
Payer: COMMERCIAL

## 2021-06-02 VITALS — DIASTOLIC BLOOD PRESSURE: 75 MMHG | SYSTOLIC BLOOD PRESSURE: 122 MMHG | OXYGEN SATURATION: 100 % | HEART RATE: 58 BPM

## 2021-06-02 DIAGNOSIS — D48.5 NEOPLASM OF UNCERTAIN BEHAVIOR OF SKIN: Primary | ICD-10-CM

## 2021-06-02 DIAGNOSIS — D04.62 SQUAMOUS CELL CARCINOMA IN SITU (SCCIS) OF SKIN OF LEFT FOREARM: ICD-10-CM

## 2021-06-02 PROCEDURE — 12032 INTMD RPR S/A/T/EXT 2.6-7.5: CPT | Performed by: DERMATOLOGY

## 2021-06-02 PROCEDURE — 11602 EXC TR-EXT MAL+MARG 1.1-2 CM: CPT | Mod: 51 | Performed by: DERMATOLOGY

## 2021-06-02 PROCEDURE — 88305 TISSUE EXAM BY PATHOLOGIST: CPT | Performed by: DERMATOLOGY

## 2021-06-02 NOTE — NURSING NOTE
The following medication was given:     MEDICATION:  Lidocaine with epinephrine 1% 1:010016  ROUTE: SQ  SITE: see procedure note  DOSE: 5cc  LOT #: [id number]  : Hospira  EXPIRATION DATE: 02/22  NDC#: 5035-0027-32  Was there drug waste? 1cc  Multi-dose vial: Yes    Miriam Hand LPN  June 2, 2021    Mastisol, steri-strips, tegaderm and pressure dressing applied to excision site on left ventral forearm.  Wound care instructions reviewed with patient and AVS provided.  Patient verbalized understanding. No further questions or concerns at this time.

## 2021-06-02 NOTE — PATIENT INSTRUCTIONS
Excision Wound Care Instructions with Steri strips      I will experience scar, altered skin color, bleeding, swelling, pain, crusting and redness. I may experience altered sensation. Risks are excessive bleeding, infection, muscle weakness, thick (hypertrophic or keloidal) scar, and recurrence. A second procedure may be recommended to obtain the best cosmetic or functional result.    Possible complications of any surgical procedure are bleeding, infection, scarring, alteration in skin color and sensation, muscle weakness in the area, wound dehiscence or seperation, or recurrence of the lesion or disease. On occasion, after healing, a secondary procedure or revision may be recommended in order to obtain the best cosmetic or functional result.       After your surgery, a pressure bandage will be placed over the area that has sutures. This will help prevent bleeding. Please, follow these instructions as they will help you to prevent complications as your wound heals.        For the First 48 hours After Surgery:    1. Leave the pressure bandage on and keep it dry. If it should come loose, you may retape it, but do not take it off.    2. Relax and take it easy. Do not do any vigorous exercise, heavy lifting, or bending forward. This could cause the wound to bleed.    3. Post-operative pain is usually mild. You may take plain or extra strength Tylenol every 4 hours as needed (do not take more than 4,000mg in one day) if you have no liver problems and your doctor says it is okay. Do not take any medicine that contains aspirin, ibuprofen or motrin unless you have been recommended these by a doctor.  Avoid alcohol and vitamin E as these may increase your tendency to bleed.    4. You may put an ice pack around the bandaged area for 20 minutes every 2-3 hours. This may help reduce swelling, bruising, and pain. Make sure the ice pack is waterproof so that the pressure bandage does not get wet.     5. You may see a small amount  of drainage or blood on your pressure bandage. This is normal. However, if drainage or bleeding continues or saturates the bandage, you will need to apply firm pressure over the bandage with a washcloth for 15 minutes. If bleeding continues after applying pressure for 15 minutes then go to the nearest emergency room.        48 Hours After Surgery remove outer white bandage down to clear plastic film. Leave this bandage on as long as it is intact up to two weeks. If it falls off prior to two weeks follow daily wound care below to keep covered for two weeks post surgery.    Daily Wound Care:    1. Once the steri strips fall off wash wound with a mild soap and water.  Use caution when washing the wound, be gentle and do not let the forceful shower stream hit the wound directly. DO NOT WASH WITH HYDROGEN PEROXIDE FOR THIS MIGHT CAUSE THE SUTURES TO DISSOLVE FASTER THAN WHAT WE WANT.     2. PAT DRY.    3. Once steri strips fall off apply Vaseline (from a new container or tube) over the suture line with a Q-tip until it is completely healed. It is very important to keep the wound continuously moist, as wounds heal best in a moist environment.    4. Keep the site covered until site is healed, you can cover it with a Telfa (non-stick) dressing and tape or a band-aid. While your steri strips are on you can use them as your bandage.    5. Once steri strips fall off if you are unable to keep wound covered, you must apply Vaseline every 2 - 3 hours (while awake) to ensure it is being kept moist for optimal healing until completely healed. A dressing overnight is recommended to keep the area moist.        Call Us If:    1. You have pain that is not controlled with Tylenol.    2. You have signs or symptoms of an infection, such as: fever over 100 degrees F, redness, warmth, or foul-smelling or yellow/creamy drainage from the wound.        Who should I call with questions?  [facility]: [phone]    [facility]: [phone]  For urgent needs outside of business hours call the [facility] at [phone] and ask for the dermatology resident on call

## 2021-06-02 NOTE — PROGRESS NOTES
DERMATOLOGY EXCISION PROCEDURE NOTE      NAME OF PROCEDURE: Excision intermediate layered linear closure  Staff surgeon: Dr. Castro  Resident: none  Scrub Nurse: Kandace Arevalo LPN    PRE-OPERATIVE DIAGNOSIS:  Squamous cell carcinoma in situ   POST-OPERATIVE DIAGNOSIS: Same   LOCATION: left ventral forearm  FINAL EXCISION SIZE(DEFECT SIZE): 1.4X1.3 cm  MARGIN: 0.4 cm  FINAL REPAIR LENGTH: 4.1 cm   ANESTHESIA: 5cc 1% lidocaine with 1:100,000 epinephrine           INDICATIONS: This patient presented with a 0.6X0.5cm Squamous cell carcinoma. Excision was indicated. We discussed the principles of treatment and most likely complications including scarring, bleeding, infection, incomplete excision, wound dehiscence, pain, nerve damage, and recurrence. Informed consent was obtained and the patient underwent the procedure as follows:    PROCEDURE: The patient was taken to the operative suite. Time-out was performed.  The treatment area was anesthetized with 1% lidocaine with epinephrine. The area was prepped with Chlorhexidine and rinsed with sterile saline and draped with sterile towels. The lesion was delineated and excised down to subcutaneous fat in a elliptical manner. Hemostasis was obtained by electrocoagulation.     REPAIR: An intermediate layered linear closure was selected as the procedure which would maximally preserve both function and cosmesis.    After the excision of the tumor, the area was carefully undermined. Hemostasis was obtained with electrocoagulation.  Closure was oriented so that the wound was in the patient's natural skin tension lines. The subcutaneous and dermal layers were then closed with 4-0 Monocryl sutures. The epidermis was then carefully approximated along the length of the wound using 4-0 Monocryl running subcuticular sutures.     Estimated blood loss was less than 10 ml for all surgical sites. A sterile pressure dressing was applied and wound care instructions, with a written handout,  were given. The [facility] was [facility] alert and ambulatory.     The patient elected for pathology results to automatically release and understands that the clinical staff will contact them as soon as possible to notify them of the results.       Dr. Castro was immediately available for the entire surgery and was physicially present for the key portions of the procedure.    Anatomic Pathology Results: pending    Clinical Follow-Up: 6 months [facility]SE    Staff Involved:  Staff Only    Scribe Disclosure:   I, Kandace Arevalo, am serving as a scribe to document services personally performed by Dr. Castro, based on data collection and the provider's statements to me.       Provider Disclosure:   The documentation recorded by the scribe accurately reflects the services I personally performed and the decisions made by me.  I personally performed the procedures today.    Anish Castro DO    Department of Dermatology  [facility]: Phone: [phone], Fax:[phone]  [facility]: Phone: [phone], Fax: [phone]

## 2021-06-02 NOTE — NURSING NOTE
Tremayne Galarza's goals for this visit include:   Chief Complaint   Patient presents with     Procedure     Excision x 1 - SCCIS left ventral forearm       He requests these members of his care team be copied on today's visit information:     PCP: [place]    Referring Provider:  No referring provider defined for this encounter.    /75 (BP Location: Right arm, Patient Position: Sitting, Cuff Size: Adult Regular)   Pulse 58   SpO2 100%     Do you need any medication refills at today's visit?     Karin Tobias LPN on 6/2/2021 at 8:45 AM

## 2021-06-02 NOTE — LETTER
6/2/2021         RE: Tremayne Galarza  [address] [address]        Dear Colleague,    Thank you for referring your patient, Tremayne Galarza, to the [facility]. Please see a copy of my visit note below.    DERMATOLOGY EXCISION PROCEDURE NOTE      NAME OF PROCEDURE: Excision intermediate layered linear closure  Staff surgeon: Dr. Castro  Resident: none  Scrub Nurse: Kandace Arevalo LPN    PRE-OPERATIVE DIAGNOSIS:  Squamous cell carcinoma in situ   POST-OPERATIVE DIAGNOSIS: Same   LOCATION: left ventral forearm  FINAL EXCISION SIZE(DEFECT SIZE): 1.4X1.3 cm  MARGIN: 0.4 cm  FINAL REPAIR LENGTH: 4.1 cm   ANESTHESIA: 5cc 1% lidocaine with 1:100,000 epinephrine           INDICATIONS: This patient presented with a 0.6X0.5cm Squamous cell carcinoma. Excision was indicated. We discussed the principles of treatment and most likely complications including scarring, bleeding, infection, incomplete excision, wound dehiscence, pain, nerve damage, and recurrence. Informed consent was obtained and the patient underwent the procedure as follows:    PROCEDURE: The patient was taken to the operative suite. Time-out was performed.  The treatment area was anesthetized with 1% lidocaine with epinephrine. The area was prepped with Chlorhexidine and rinsed with sterile saline and draped with sterile towels. The lesion was delineated and excised down to subcutaneous fat in a elliptical manner. Hemostasis was obtained by electrocoagulation.     REPAIR: An intermediate layered linear closure was selected as the procedure which would maximally preserve both function and cosmesis.    After the excision of the tumor, the area was carefully undermined. Hemostasis was obtained with electrocoagulation.  Closure was oriented so that the wound was in the patient's natural skin tension lines. The subcutaneous and dermal layers were then closed with 4-0 Monocryl sutures. The epidermis was then carefully  approximated along the length of the wound using 4-0 Monocryl running subcuticular sutures.     Estimated blood loss was less than 10 ml for all surgical sites. A sterile pressure dressing was applied and wound care instructions, with a written handout, were given. The patient was discharged from the [facility] alert and ambulatory.     The patient elected for pathology results to automatically release and understands that the clinical staff will contact them as soon as possible to notify them of the results.       Dr. Castro was immediately available for the entire surgery and was physicially present for the key portions of the procedure.    Anatomic Pathology Results: pending    Clinical Follow-Up: 6 months FBSE    Staff Involved:  Staff Only    Scribe Disclosure:   I, Kandace Arevalo, am serving as a scribe to document services personally performed by Dr. Castro, based on data collection and the provider's statements to me.       Provider Disclosure:   The documentation recorded by the scribe accurately reflects the services I personally performed and the decisions made by me.  I personally performed the procedures today.    Anish Castro DO    Department of Dermatology  [facility]: Phone: [phone], Fax:[phone]  [facility]: Phone: [phone], Fax: [phone]        Again, thank you for allowing me to participate in the care of your patient.        Sincerely,        Anish Castro MD

## 2021-06-05 LAB — COPATH REPORT: NORMAL

## 2021-07-13 DIAGNOSIS — I25.10 CORONARY ARTERY DISEASE INVOLVING NATIVE CORONARY ARTERY OF NATIVE HEART WITHOUT ANGINA PECTORIS: ICD-10-CM

## 2021-07-15 RX ORDER — ATENOLOL 25 MG/1
25 TABLET ORAL DAILY
Qty: 90 TABLET | Refills: 0 | Status: SHIPPED | OUTPATIENT
Start: 2021-07-15 | End: 2021-10-20

## 2021-07-15 NOTE — TELEPHONE ENCOUNTER
Last Clinic Visit: 4/6/20  NV: NONE  RTC 1 YEAR  Scheduling has been notified to contact the pt for appointment

## 2021-07-16 ENCOUNTER — TELEPHONE (OUTPATIENT)
Dept: CARDIOLOGY | Facility: CLINIC | Age: 61
End: 2021-07-16

## 2021-07-20 NOTE — PROGRESS NOTES
Colon and Rectal Surgery Postoperative Clinic Note     Referring provider:  Referred Self, MD  No address on file       RE: Tremayne Galarza  : 1960  STEFANIA: 2020    Tremayne Galarza is a very pleasant 60 year old male here for follow-up of anorectal fistula.     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     He developed two small boils on his buttock and hip with cultures showing staphylococcus aureus and he was treated with Augmentin.    MRI Pelvis 20:  1. 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  2. Near complete resolution of the previously seen posterior perianal  abscess, with minimal residual dilation of the superior fistula tract.  3. Skin and subcutaneous soft tissue thickening and enhancement in the  anterior left perineal region corresponding to the clinically visible  lesion.    Colonoscopy in  was normal.    He saw dermatology and was treated for folliculitis     Tremayne has had a large posterior fistula that is intersphincteric with a large internal orifice. This has gotten worse despite a seton. An Magnetic Resonance Imaging (MRI) 20 showed complexity and a horseshoe component to the right.    He is now status post examination under anesthesia with partial fistulotomy, debridement of the fistula and placement of a seton on 20 with a large internal orifice, extension to the deep post-anal space and horseshoeing to the right    Interval history: Tremayne reports that he is doing well. No pain. Minimal drainage. No difficulty with bowel movements.    PLEASE SEE NOTE BELOW FOR PHYSICAL EXAMINATION, REVIEW OF SYSTEMS, AND OTHER HISTORY.    Assessment/Plan:  60 year old male with anorectal fistula with large internal orifice and extension to the deep post-anal space and horseshoeing to the right. He is doing quite well with minimal drainage and  no pain. Two setons in place. I would like to give the internal cavity some more time to heal and would then like to re image him before removing one of the setons. Will get a 3T MRI in one month and if there is no branching and internal orifice has improved, will remove one of the setons going through the same tract. Will then give additional time for additional healing before considering fistulotomy. Tremayne is in agreement with this plan and I encouraged him to contact the clinic with any worsening symptoms in the meantime.    This is a postoperative visit. Total time was 15 minutes, over 50% was spent counseling the patient.     PLEASE SEE NOTE BELOW FOR PHYSICAL EXAMINATION, REVIEW OF SYSTEMS, AND OTHER HISTORY.    Camila Calderon MD  Colon and Rectal Surgery Staff  [facility]    -------------------------------------------------------------------------------------------------------------------    Medical history:  Past Medical History:   Diagnosis Date     Chronic ischemic heart disease 9/4/2014    Overview:  3 stents to RCA 8/2014      Essential hypertension 2/11/2005    Overview:  LW Onset:  [id number]      Fistula      Hyperlipidemia 11/14/2005    Overview:  LW Onset:  14Nov05      Perirectal abscess      Staph infection        Surgical history:  Past Surgical History:   Procedure Laterality Date     BIOPSY       CARDIAC SURGERY       COLONOSCOPY N/A 12/30/2015    Procedure: COLONOSCOPY;  Surgeon: Duane, William Charles, MD;  Location: [facility] OR     COLONOSCOPY WITH CO2 INSUFFLATION N/A 12/30/2015    Procedure: COLONOSCOPY WITH CO2 INSUFFLATION;  Surgeon: Duane, William Charles, MD;  Location: [facility] OR     EXAM UNDER ANESTHESIA ANUS N/A 1/10/2020    Procedure: EXAM UNDER ANESTHESIA, ANUS, inscision and drainage of perirectal abscess, partial fistulotomy,  seton placement;  Surgeon: Camila Calderon MD;  Location: [facility] OR     EXAM UNDER ANESTHESIA ANUS N/A 6/19/2020     Procedure: Exam Under Anesthesia, Fistulotomy, Seton placement;  Surgeon: Camila Calderon MD;  Location: [facility] OR     FISTULOTOMY RECTUM N/A 1/10/2020    Procedure: partial FISTULOTOMY, RECTUM;  Surgeon: Camila Calderon MD;  Location: [facility] OR     PLACEMENT OF SETON RECTUM N/A 1/10/2020    Procedure: PLACEMENT, SETON STITCH;  Surgeon: Camila Calderon MD;  Location: [facility] OR       Problem list:    Patient Active Problem List    Diagnosis Date Noted     Staph infection 03/03/2020     Priority: Medium     History of nonmelanoma skin cancer 03/03/2020     Priority: Medium     History of dysplastic nevus 03/03/2020     Priority: Medium     Perianal abscess 01/09/2020     Priority: Medium     Added automatically from request for surgery [id number]       Anal fistula 01/09/2020     Priority: Medium     Added automatically from request for surgery [id number]       Verruca plantaris 04/11/2018     Priority: Medium     LUQ abdominal pain 03/27/2018     Priority: Medium     CAD (coronary artery disease) 08/05/2015     Priority: Medium     Status post coronary angiogram 03/26/2015     Priority: Medium     Nondependent alcohol abuse 02/03/2015     Priority: Medium     Chronic ischemic heart disease 09/04/2014     Priority: Medium     Overview:   3 stents to RCA 8/2014       Postsurgical percutaneous transluminal coronary angioplasty status 09/04/2014     Priority: Medium     Benign neoplasm of colon 06/05/2013     Priority: Medium     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 Provider to review and confirm.       HTN, goal below 140/90 02/11/2005     Priority: Medium     Overview:   LW Onset:  25Kkz05       Obesity 02/11/2005     Priority: Medium     Overview:   LW Onset:  29Xaa12       Allergic rhinitis 02/11/2005     Priority: Medium      Overview:   LW Onset:  76Fbw23         Medications:  Current Outpatient Medications   Medication Sig Dispense Refill     aspirin (CHILDRENS ASPIRIN) 81 MG chewable tablet Take 81 mg by mouth       atenolol (TENORMIN) 25 MG tablet Take 1 tablet (25 mg) by mouth daily 90 tablet 3     ezetimibe (ZETIA) 10 MG tablet TAKE ONE TABLET BY MOUTH ONCE DAILY 90 tablet 0     fexofenadine (ALLEGRA) 60 MG tablet Take 0.5 tablets (30 mg) by mouth daily 60 tablet 3     mupirocin (BACTROBAN) 2 % external ointment Use 3 times per day for 3 weeks. Then the first week of every month for 6 months. 60 g 2     rosuvastatin (CRESTOR) 40 MG tablet Take 1 tablet (40 mg) by mouth daily 90 tablet 3     sildenafil (VIAGRA) 50 MG tablet Take 1 tablet (50 mg) by mouth daily as needed (erectile dysfunction) as needed for ED 10 tablet 0       Allergies:  No Known Allergies    Family history:  Family History   Problem Relation Age of Onset     Coronary Artery Disease Mother      Hypertension Mother      Cerebrovascular Disease Mother        Social history:  Social History     Socioeconomic History     Marital status:      Spouse name: Not on file     Number of children: Not on file     Years of education: Not on file     Highest education level: Not on file   Occupational History     Not on file   Social Needs     Financial resource strain: Not on file     Food insecurity     Worry: Not on file     Inability: Not on file     Transportation needs     Medical: Not on file     Non-medical: Not on file   Tobacco Use     Smoking status: Never Smoker     Smokeless tobacco: Never Used   Substance and Sexual Activity     Alcohol use: Yes     Drug use: No     Sexual activity: Yes     Partners: Female   Lifestyle     Physical activity     Days per week: Not on file     Minutes per session: Not on file     Stress: Not on file   Relationships     Social connections     Talks on phone: Not on file     Gets together: Not on file     Attends Holiness  service: Not on file     Active member of club or organization: Not on file     Attends meetings of clubs or organizations: Not on file     Relationship status: Not on file     Intimate partner violence     Fear of current or ex partner: Not on file     Emotionally abused: Not on file     Physically abused: Not on file     Forced sexual activity: Not on file   Other Topics Concern     Parent/sibling w/ CABG, MI or angioplasty before 65F 55M? Not Asked   Social History Narrative     Not on file         Nursing Notes:   Cornelius Chavez LPN  8/5/2020  3:18 PM  Signed  Chief Complaint   Patient presents with     RECHECK     Post-op DOS 6/19/2020       Vitals:    08/05/20 1515   BP: (!) 140/89   BP Location: Left arm   Patient Position: Sitting   Cuff Size: Adult Regular   Pulse: 51   SpO2: 97%   Weight: 207 lb 3.2 oz   Height: 6'       Body mass index is 28.1 kg/m .      Cornelius Chavez LPN                           Physical Examination:  BP (!) 140/89 (BP Location: Left arm, Patient Position: Sitting, Cuff Size: Adult Regular)   Pulse 51   Ht 1.829 m (6')   Wt 94 kg (207 lb 3.2 oz)   SpO2 97%   BMI 28.10 kg/m    General: alert, oriented, in no acute distress, sitting comfortably  HEENT: mucous membranes moist  Perianal external examination:  Two yellow vessel loops setons in place in the posterior position with external site healed up to the seton. Minimal drainage and no induration or fluctuance    Digital rectal examination: Was deferred.    Anoscopy: Was deferred.     Hemostasis: Electrocautery

## 2021-08-09 DIAGNOSIS — E78.00 HYPERCHOLESTEREMIA: ICD-10-CM

## 2021-08-11 RX ORDER — ROSUVASTATIN CALCIUM 40 MG/1
40 TABLET, COATED ORAL DAILY
Qty: 90 TABLET | Refills: 0 | Status: SHIPPED | OUTPATIENT
Start: 2021-08-11 | End: 2021-11-16

## 2021-08-11 RX ORDER — EZETIMIBE 10 MG/1
10 TABLET ORAL DAILY
Qty: 90 TABLET | OUTPATIENT
Start: 2021-08-11

## 2021-08-11 NOTE — TELEPHONE ENCOUNTER
Medication: Rosuvastatin 40mg    Last Fill: 05/14/21       Qty: 90   Last Rx Date: 05/13/21     Last MD Visit: 04/06/20    Medication: Ezetimbe 10mg    Last Fill: 05/12/21       Qty: 90   Last Rx Date: 09/11/20     Last MD Visit: 044/06/20

## 2021-08-11 NOTE — TELEPHONE ENCOUNTER
"Requested Prescriptions   Pending Prescriptions Disp Refills     ezetimibe (ZETIA) 10 MG tablet [Pharmacy Med Name: EZETIMIBE 10MG TABS] 90 tablet      Sig: Take 1 tablet (10 mg) by mouth daily Call clinic to schedule follow up appointment.       Antihyperlipidemic agents Failed - 8/11/2021  1:25 PM        Failed - Lipid panel on file in past 12 mos     Recent Labs   Lab Test 06/25/20  1104 02/06/15  0735   CHOL 224* 146   TRIG 131 110   HDL 54 44   * 80   NHDL 170*  --    VLDL  --  22   CHOLHDLRATIO  --  3.3               Failed - Normal serum ALT on record in past 12 mos     Recent Labs   Lab Test 12/23/19  1544   ALT 36             Failed - Recent (12 mo) or future (30 days) visit within the authorizing provider's specialty     Patient has had an office visit with the authorizing provider or a provider within the authorizing providers department within the previous 12 mos or has a future within next 30 days. See \"Patient Info\" tab in [employer], or \"Choose Columns\" in Meds & Orders section of the refill encounter.              Passed - Medication is active on med list        Passed - Patient is age 18 years or older         Signed Prescriptions Disp Refills    rosuvastatin (CRESTOR) 40 MG tablet 90 tablet 0     Sig: Take 1 tablet (40 mg) by mouth daily       Statins Protocol Failed - 8/11/2021 11:20 AM        Failed - LDL on file in past 12 months     Recent Labs   Lab Test 06/25/20  1104   *             Failed - Recent (12 mo) or future (30 days) visit within the authorizing provider's specialty     Patient has had an office visit with the authorizing provider or a provider within the authorizing providers department within the previous 12 mos or has a future within next 30 days. See \"Patient Info\" tab in [employer], or \"Choose Columns\" in Meds & Orders section of the refill encounter.              Passed - No abnormal creatine kinase in past 12 months     No lab results found.             Passed - " Medication is active on med list        Passed - Patient is age 18 or older          Patient was to have lipids rechecked around Sept 29, 2020.   Will need labs prior to refill.     Morena Perez RN  Medical Speciality Care Coordinator  [facility]  Phone: [phone]

## 2021-08-11 NOTE — CONFIDENTIAL NOTE
ezetimibe (ZETIA) 10 MG tablet  Last Written Prescription Date:  9/11/20  Last Fill Quantity: 90   # refills: 2  Last Office Visit :4/6/20  Future Office visit:  None        rosuvastatin (CRESTOR) 40 MG tablet  Last Written Prescription Date:  5/13/21  Last Fill Quantity: 90,   # refills: 0        FOLLOW UP:  1 year    Scheduling has been notified to contact the pt for appointment.    Routing refill request to provider for review/approval because: Antihyperlipidemic agents Failed   Lipid panel on file in past 12 mos     Normal serum ALT on record in past 12 mos

## 2021-09-22 ENCOUNTER — LAB (OUTPATIENT)
Dept: LAB | Facility: CLINIC | Age: 61
End: 2021-09-22
Payer: COMMERCIAL

## 2021-09-22 DIAGNOSIS — I25.10 CAD (CORONARY ARTERY DISEASE): ICD-10-CM

## 2021-09-22 DIAGNOSIS — E78.5 HYPERLIPIDEMIA WITH TARGET LDL LESS THAN 100: ICD-10-CM

## 2021-09-22 LAB
CHOLEST SERPL-MCNC: 124 MG/DL
FASTING STATUS PATIENT QL REPORTED: YES
HDLC SERPL-MCNC: 52 MG/DL
HOLD SPECIMEN: NORMAL
LDLC SERPL CALC-MCNC: 56 MG/DL
NONHDLC SERPL-MCNC: 72 MG/DL
TRIGL SERPL-MCNC: 80 MG/DL

## 2021-09-22 PROCEDURE — 80061 LIPID PANEL: CPT

## 2021-09-22 PROCEDURE — 36415 COLL VENOUS BLD VENIPUNCTURE: CPT

## 2021-10-03 ENCOUNTER — HEALTH MAINTENANCE LETTER (OUTPATIENT)
Age: 61
End: 2021-10-03

## 2021-10-05 ENCOUNTER — E-VISIT (OUTPATIENT)
Dept: URGENT CARE | Facility: URGENT CARE | Age: 61
End: 2021-10-05
Payer: COMMERCIAL

## 2021-10-05 ENCOUNTER — LAB (OUTPATIENT)
Dept: LAB | Facility: CLINIC | Age: 61
End: 2021-10-05
Attending: PHYSICIAN ASSISTANT
Payer: COMMERCIAL

## 2021-10-05 DIAGNOSIS — Z20.822 CLOSE EXPOSURE TO 2019 NOVEL CORONAVIRUS: Primary | ICD-10-CM

## 2021-10-05 DIAGNOSIS — Z20.822 CLOSE EXPOSURE TO 2019 NOVEL CORONAVIRUS: ICD-10-CM

## 2021-10-05 LAB — SARS-COV-2 RNA RESP QL NAA+PROBE: NEGATIVE

## 2021-10-05 PROCEDURE — U0005 INFEC AGEN DETEC AMPLI PROBE: HCPCS

## 2021-10-05 PROCEDURE — 99421 OL DIG E/M SVC 5-10 MIN: CPT | Performed by: PHYSICIAN ASSISTANT

## 2021-10-05 PROCEDURE — U0003 INFECTIOUS AGENT DETECTION BY NUCLEIC ACID (DNA OR RNA); SEVERE ACUTE RESPIRATORY SYNDROME CORONAVIRUS 2 (SARS-COV-2) (CORONAVIRUS DISEASE [COVID-19]), AMPLIFIED PROBE TECHNIQUE, MAKING USE OF HIGH THROUGHPUT TECHNOLOGIES AS DESCRIBED BY CMS-2020-01-R: HCPCS

## 2021-10-05 NOTE — PATIENT INSTRUCTIONS
"  Dear Tremayne Galarza,    Based on your exposure to COVID-19 (coronavirus), we would like to test you for this virus. I have placed an order for this test. The best time for testing is 5-7 days after the exposure.    How to schedule:  For all employees or close contacts (except [place] and [employer] - see below), go to your [employer] home page and scroll down to the section that says  You have an appointment that needs to be scheduled  and click the large green button that says  Schedule Now  and follow the steps to find the next available opening.     If you are unable to complete these steps or if you cannot find any available times, please call [phone] to schedule employee testing.     [facility] employees or close contacts, please call [phone].   [place] [employer]) employees or close contacts call [phone].    Return to work/school/ guidance:   For people with high risk exposures outside the home    Please let your workplace manager and staffing office know when your quarantine ends.     We can not give you an exact date as it depends on the information below. You can calculate this on your own or work with your manager/staffing office to calculate this. (For example if you were exposed on 10/4, you would have to quarantine for 14 full days. That would be through 10/18. You could return on 10/19.)    Quarantine Guidelines:  Patients (\"contacts\") who have been in close prolonged contact of an infected person(s) (within six feet for at least 15 minutes within a 24 hour period), and remain asymptomatic should enter quarantine based on the following options:    14-day quarantine period (this remains the CDC recommendation for the greatest protection against spread of COVID-19) OR    Minimum 7-day quarantine with negative RT-PCR test collected on day 5 or later OR    10-day quarantine with no test  Quarantine Guideline exceptions are as follows:    People who have been fully vaccinated do not need " to quarantine if the exposure was at least 2 weeks after the last vaccination. This includes vaccinated health care workers.    Not fully vaccinated and unvaccinated Individuals who work in health care, congregate care, or congregate living should be off work for 14 days from their last date of exposure. Community activities for this group can be resumed based on options above. Fully vaccinated individuals in this group do not need to quarantine from work after exposure.    Not fully vaccinated and unvaccinated people whose high-risk exposure was a household member should always quarantine for 14 days from their last date of exposure. Fully vaccinated people in this category do not need to quarantine.    Not fully vaccinated or unvaccinated residents of congregate care and congregate living settings should always quarantine for 14 days from their last date of exposure. Fully vaccinated residents do not need to quarantine.    Note: If you have ongoing exposure to the covid positive person, this quarantine period may be more than 14 days. (For example, if you are continued to be exposed to your child who tested positive and cannot isolate from them, then the quarantine of 7-14 days can't start until your child is no longer contagious. This is typically 10 days from onset of the child's symptoms. So the total duration may be 17-24 days in this case.)    You should continue symptom monitoring until day 14 post-exposure. If you develop signs or symptoms of COVID-19, isolate and get tested (even if you have been tested already).    How to quarantine:   Stay home and away from others. Don't go to school or anywhere else. Generally quarantine means staying home from work but there are some exceptions to this. Please contact your workplace.  No hugging, kissing or shaking hands.  Don't let anyone visit.  Cover your mouth and nose with a mask, tissue or washcloth to avoid spreading germs.  Wash your hands and face often. Use  soap and water.    What are the symptoms of COVID-19?  The most common symptoms are cough, fever and trouble breathing. Less common symptoms include headache, body aches, fatigue (feeling very tired), chills, sore throat, stuffy or runny nose, diarrhea (loose poop), loss of taste or smell, belly pain, and nausea or vomiting (feeling sick to your stomach or throwing up).  After 14 days, if you have still don't have symptoms, you likely don't have this virus.  If you develop symptoms, follow these guidelines.  If you're normally healthy: Please start another eVisit.  If you have a serious health problem (like cancer, heart failure, an organ transplant or kidney disease): Call your specialty clinic. Let them know that you might have COVID-19.    Where can I get more information?   [employer] [facility] - About COVID-19: [email or web address]  CDC - What to Do If You're Sick: www.cdc.gov/coronavirus/2019-ncov/about/steps-when-sick.html  CDC - Ending Home Isolation: www.cdc.gov/coronavirus/2019-ncov/hcp/disposition-in-home-patients.html  CDC - Caring for Someone: www.cdc.gov/coronavirus/2019-ncov/if-you-are-sick/care-for-someone.html  [facility] clinical trials (COVID-19 research studies): clinicalaffairs.[facility]-clinical-trials  Below are the COVID-19 hotlines at the [facility] ([facility]). Interpreters are available.  For health questions: Call 931-892-7128 or 1-828.717.1072 (7 a.m. to 7 p.m.)  For questions about schools and childcare: Call 600-355-7300 or 1-163.864.7823 (7 a.m. to 7 p.m.)      October 5, 2021  RE:  Tremayne Galarza                                                                                                                   [address] [address]      To whom it may concern:    I evaluated Tremayne Galarza on October 5, 2021. Tremayne Galarza should be excused from work/school.    They should let their workplace manager and staffing office know when  "their quarantine ends.    We can not give an exact date as it depends on the information below. They can calculate this on their own or work with their manager/staffing office to calculate this. (For example if they were exposed on 10/04, they would have to quarantine for 14 full days. That would be through 10/18. They could return on 10/19.)    Quarantine Guidelines:    Patients (\"contacts\") who have been in close prolonged contact of an infected person(s) (within six feet for at least 15 minutes within a 24 hour period) and remain asymptomatic should enter quarantine based on the following options:      14-day quarantine period (this remains the CDC recommendation for the greatest protection against spread of COVID-19) OR    Minimum 7-day quarantine with negative RT-PCR test collected on day 5 or later OR    10-day quarantine with no test   Quarantine Guideline exceptions are as follows:    People who have been fully vaccinated do not need to quarantine if the exposure was at least 2 weeks after the last vaccination. This includes vaccinated health care workers.    Not fully vaccinated and unvaccinated Individuals who work in health care, congregate care, or congregate living should be off work for 14 days from their last date of exposure. Community activities for this group can be resumed based on options above. Fully vaccinated individuals in this group do not need to quarantine from work after exposure.    Not fully vaccinated and unvaccinated people whose high-risk exposure was a household member should always quarantine for 14 days from their last date of exposure. Fully vaccinated people in this category do not need to quarantine.    Not fully vaccinated or unvaccinated residents of congregate care and congregate living settings should always quarantine for 14 days from their last date of exposure. Fully vaccinated residents do not need to quarantine.    Note: If there is ongoing exposure to the covid " positive person, this quarantine period may be longer than 14 days. (For example, if they are continually exposed to their child, who tested positive and cannot isolate from them, then the quarantine of 7-14 days can't start until their child is no longer contagious. This is typically 10 days from onset to the child's symptoms. So the total duration may be 17-24 days in this case.)    Tremayne LUCIO Geovanni should continue symptom monitoring until day 14 post-exposure. If they develop signs or symptoms of COVID-19, they should isolate and get tested (even if they have been tested already).    Sincerely,  Brooks Thomson PA-C

## 2021-10-11 NOTE — PROGRESS NOTES
[facility] Dermatology Note  Encounter Date: Oct 12, 2021  Office Visit   Dermatology Problem List:  1. 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 Compound nevus with severe dysplasia, left chest  -s/p excision 1/15/2016  3. Eczema, left shin  -Previous Tx:  triamcinolone (initiated 1/4/2016)  4. Subcutaneous nodules  -current tx: doxycycline, mupirocin, BPO wash, Bactroban  -s/p expression 03/03/20  -s/p bacterial culture 03/03/20  5. AKs  -s/p cryotherapy  6. History of benign biopsy  - intradermal melanocytic nevus, left posterior thigh, s/p bx 3/30/21  ____________________________________________    Assessment & Plan:    # History of NMSC. No evidence of recurrence.   - Recommend sunscreens SPF #30 or greater, protective clothing and avoidance of tanning beds.   - Recommended next skin check in 6 months.    # History of DN: No evidence of recurrence. While these are benign, they are a marker for increased melanoma risk.- no repgimentation  - Recommended next skin check in 6 months.    Procedures Performed:   None.    Follow-up: 6 months in-person for skin check, or earlier for new or changing lesions.    Staff and Scribe:     Scribe Disclosure:   I, Leatha Odom, am serving as a scribe to document services personally performed by this physician, Dr. Palomo Moore, based on data collection and the provider's statements to me.     Provider Disclosure:   The documentation recorded by the scribe accurately reflects the services I personally performed and the decisions made by me.    Radha Orona MD    Department of Dermatology  [facility]: Phone: [phone], Fax:[phone]  [facility]: Phone: [phone], Fax:  [phone]      ____________________________________________    CC: Skin Check (no concerns today, HX of NMSC)    HPI:  Mr. Tremayne Galarza is a(n) 61 year old male who presents today as a return patient for skin check.    Last skin check was 3/30/21. At that time, biopsies determined SCCIS (left ventral forearm) and benign nevus (left posterior thigh).    The SCCIS has since been excised by Dr. Castro on 6/2/21.    Today, patient notes concern of a spot on the scalp this summer that started as a bump. Tore it off, and went away. Has not come back.    Patient is otherwise feeling well, without additional skin concerns. Nothing bleeding, crusting, or changing.     Labs Reviewed:  Derm path from 3/30/21 reviewed.    SPECIMEN(S):   A: Skin, left ventral forearm, shave   B: Skin, left posterior thigh, shave     FINAL DIAGNOSIS:   A. Skin, left ventral forearm, shave:   - Squamous cell carcinoma in situ, extending to the lateral margin -     B. Skin, left posterior thigh, shave:   - Predominantly intradermal melanocytic nevus -    Physical Exam:  Vitals: There were no vitals taken for this visit.  SKIN: Total skin excluding the undergarment areas was performed. The exam included the head/face, neck, both arms, chest, back, abdomen, both legs, digits and/or nails.   - Well healed scars at areas of past NMSC.  - No other lesions of concern on areas examined.     Medications:  Current Outpatient Medications   Medication     atenolol (TENORMIN) 25 MG tablet     ezetimibe (ZETIA) 10 MG tablet     fexofenadine (ALLEGRA) 60 MG tablet     rosuvastatin (CRESTOR) 40 MG tablet     sildenafil (VIAGRA) 50 MG tablet     aspirin (CHILDRENS ASPIRIN) 81 MG chewable tablet     No current facility-administered medications for this visit.      Past Medical History:   Patient Active Problem List   Diagnosis     Chronic ischemic heart disease     Benign neoplasm of colon     Hyperlipidemia with target LDL less than 100     HTN, goal below  140/90     Lesion of plantar nerve     Obesity     Allergic rhinitis     Postsurgical percutaneous transluminal coronary angioplasty status     Nondependent alcohol abuse     Status post coronary angiogram     CAD (coronary artery disease)     LUQ abdominal pain     Verruca plantaris     Perianal abscess     Anal fistula     Staph infection     History of nonmelanoma skin cancer     History of dysplastic nevus     Past Medical History:   Diagnosis Date     Chronic ischemic heart disease 9/4/2014    Overview:  3 stents to RCA 8/2014      Essential hypertension 2/11/2005    Overview:  LW Onset:  60Hob62      Fistula      Hyperlipidemia 11/14/2005    Overview:  LW Onset:  62Scd22      Perirectal abscess      Staph infection         CC No referring provider defined for this encounter. on close of this encounter.

## 2021-10-12 ENCOUNTER — OFFICE VISIT (OUTPATIENT)
Dept: DERMATOLOGY | Facility: CLINIC | Age: 61
End: 2021-10-12
Payer: COMMERCIAL

## 2021-10-12 DIAGNOSIS — Z86.018 HISTORY OF DYSPLASTIC NEVUS: ICD-10-CM

## 2021-10-12 DIAGNOSIS — Z85.828 HISTORY OF NONMELANOMA SKIN CANCER: Primary | ICD-10-CM

## 2021-10-12 PROCEDURE — 99213 OFFICE O/P EST LOW 20 MIN: CPT | Performed by: DERMATOLOGY

## 2021-10-12 ASSESSMENT — PAIN SCALES - GENERAL: PAINLEVEL: NO PAIN (0)

## 2021-10-12 NOTE — NURSING NOTE
Tremayne Galarza's goals for this visit include:   Chief Complaint   Patient presents with     Skin Check     no concerns today, HX of NMSC       He requests these members of his care team be copied on today's visit information:     PCP: [facility]    Referring Provider:  No referring provider defined for this encounter.    There were no vitals taken for this visit.    Do you need any medication refills at today's visit? no

## 2021-10-12 NOTE — LETTER
10/12/2021         RE: Tremayne Galarza  [address] [address]        Dear Colleague,    Thank you for referring your patient, Tremayne Galarza, to the [facility]. Please see a copy of my visit note below.    [facility] Dermatology Note  Encounter Date: Oct 12, 2021  Office Visit   Dermatology Problem List:  1. 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 Compound nevus with severe dysplasia, left chest  -s/p excision 1/15/2016  3. Eczema, left shin  -Previous Tx:  triamcinolone (initiated 1/4/2016)  4. Subcutaneous nodules  -current tx: doxycycline, mupirocin, BPO wash, Bactroban  -s/p expression 03/03/20  -s/p bacterial culture 03/03/20  5. AKs  -s/p cryotherapy  6. History of benign biopsy  - intradermal melanocytic nevus, left posterior thigh, s/p bx 3/30/21  ____________________________________________    Assessment & Plan:    # History of NMSC. No evidence of recurrence.   - Recommend sunscreens SPF #30 or greater, protective clothing and avoidance of tanning beds.   - Recommended next skin check in 6 months.    # History of DN: No evidence of recurrence. While these are benign, they are a marker for increased melanoma risk.- no repgimentation  - Recommended next skin check in 6 months.    Procedures Performed:   None.    Follow-up: 6 months in-person for skin check, or earlier for new or changing lesions.    Staff and Scribe:     Scribe Disclosure:   I, Leatha Odom, am serving as a scribe to document services personally performed by this physician, Dr. Palomo Moore, based on data collection and the provider's statements to me.     Provider Disclosure:   The documentation recorded by the scribe accurately reflects the services I personally performed and the decisions made by me.    Radha Orona MD    Department of  Dermatology  [facility]: Phone: [phone], Fax:[phone]  [facility]: Phone: [phone], Fax: [phone]      ____________________________________________    CC: Skin Check (no concerns today, HX of NMSC)    HPI:  Mr. Tremayne Galarza is a(n) 61 year old male who presents today as a return patient for skin check.    Last skin check was 3/30/21. At that time, biopsies determined SCCIS (left ventral forearm) and benign nevus (left posterior thigh).    The SCCIS has since been excised by Dr. Castro on 6/2/21.    Today, patient notes concern of a spot on the scalp this summer that started as a bump. Tore it off, and went away. Has not come back.    Patient is otherwise feeling well, without additional skin concerns. Nothing bleeding, crusting, or changing.     Labs Reviewed:  Derm path from 3/30/21 reviewed.    SPECIMEN(S):   A: Skin, left ventral forearm, shave   B: Skin, left posterior thigh, shave     FINAL DIAGNOSIS:   A. Skin, left ventral forearm, shave:   - Squamous cell carcinoma in situ, extending to the lateral margin -     B. Skin, left posterior thigh, shave:   - Predominantly intradermal melanocytic nevus -    Physical Exam:  Vitals: There were no vitals taken for this visit.  SKIN: Total skin excluding the undergarment areas was performed. The exam included the head/face, neck, both arms, chest, back, abdomen, both legs, digits and/or nails.   - Well healed scars at areas of past NMSC.  - No other lesions of concern on areas examined.     Medications:  Current Outpatient Medications   Medication     atenolol (TENORMIN) 25 MG tablet     ezetimibe (ZETIA) 10 MG tablet     fexofenadine (ALLEGRA) 60 MG tablet     rosuvastatin (CRESTOR) 40 MG tablet     sildenafil (VIAGRA) 50 MG tablet     aspirin (CHILDRENS ASPIRIN) 81 MG chewable tablet     No current facility-administered medications for this  visit.      Past Medical History:   Patient Active Problem List   Diagnosis     Chronic ischemic heart disease     Benign neoplasm of colon     Hyperlipidemia with target LDL less than 100     HTN, goal below 140/90     Lesion of plantar nerve     Obesity     Allergic rhinitis     Postsurgical percutaneous transluminal coronary angioplasty status     Nondependent alcohol abuse     Status post coronary angiogram     CAD (coronary artery disease)     LUQ abdominal pain     Verruca plantaris     Perianal abscess     Anal fistula     Staph infection     History of nonmelanoma skin cancer     History of dysplastic nevus     Past Medical History:   Diagnosis Date     Chronic ischemic heart disease 9/4/2014    Overview:  3 stents to RCA 8/2014      Essential hypertension 2/11/2005    Overview:  LW Onset:  06Emw33      Fistula      Hyperlipidemia 11/14/2005    Overview:  LW Onset:  37Qnv85      Perirectal abscess      Staph infection         CC No referring provider defined for this encounter. on close of this encounter.      Again, thank you for allowing me to participate in the care of your patient.        Sincerely,        Radha Orona MD

## 2021-10-12 NOTE — PATIENT INSTRUCTIONS
[facility] Dermatology Visit    Thank you for allowing us to participate in your care. Your findings, instructions and follow-up plan are as follows:         When should I call my doctor?    If you are worsening or not improving, please, contact us or seek urgent care as noted below.     Who should I call with questions (adults)?    [facility] (adult and pediatric): [phone]    [facility] (adult): [phone]    For urgent needs outside of business hours call the [facility] at [phone] and ask for the dermatology resident on call    If this is a medical emergency and you are unable to reach an ER, Call 911    Who should I call with questions (pediatric)?  [facility]- Pediatric Dermatology  Dr. Opal Nevarez, Dr. Jairo Walsh, Dr. Yary James, Sammi Soler, PA  Dr. Tiffanie Watson, Dr. Valeria Strange & Dr. Cleveland Rogers  Non Urgent  Nurse Triage Line; [phone]- [place] [facility] [place] [facility] Care Coordinators   Lara (/Complex ) [phone]    If you need a prescription refill, please contact your pharmacy. Refills are approved or denied by our physicians during normal business hours, Monday through Fridays  Per office policy, refills will not be granted if you have not been seen within the past year (or sooner depending on your child's condition).    Scheduling Information:  Pediatric Appointment Scheduling and Call Center [phone]  Radiology Scheduling- [phone]  Sedation Unit Scheduling- [phone]  [place] Scheduling- General [phone]; Pediatric Dermatology [phone]  Main  Services: [phone]  Faroese: [phone]  Panamanian: [phone]  Hmong/Malay/Nepali: [phone]  Preadmission Nursing Department Fax Number: [phone] (fax all pre-operative paperwork to this number)    For urgent matters arising during evenings,  weekends, or holidays that cannot wait for normal business hours please call [phone] and ask for the dermatology resident on call to be paged.

## 2021-10-17 DIAGNOSIS — I25.10 CORONARY ARTERY DISEASE INVOLVING NATIVE CORONARY ARTERY OF NATIVE HEART WITHOUT ANGINA PECTORIS: ICD-10-CM

## 2021-10-20 NOTE — TELEPHONE ENCOUNTER
10/20 Called and spoke to patient. He is currently scheduled for follow up.     Earnestine contreras Procedure   Orthopedics, Podiatry, Sports Medicine, ENT/Eye Specialties  [facility]   [phone]

## 2021-10-20 NOTE — TELEPHONE ENCOUNTER
ATENOLOL 25MG TABS      Last Written Prescription Date:  7-15-21  Last Fill Quantity: 90,   # refills: 0  Last Office Visit : 4-6-20 ( RTC 1 Y)  Future Office visit:  none    Routing refill request to provider for review/approval because:  Last appt > 18 M    Scheduling has been notified to contact the pt for appointment.

## 2021-10-26 DIAGNOSIS — E78.00 HYPERCHOLESTEREMIA: ICD-10-CM

## 2021-10-28 NOTE — TELEPHONE ENCOUNTER
ezetimibe (ZETIA) 10 MG tablet   Take 1 tablet (10 mg) by mouth daily      Last Written Prescription Date:  9/11/20  Last Fill Quantity: 90,   # refills: 2  Last Office Visit : 4/6/20  Future Office visit:  11/26/21    Routing refill request to provider for review/approval because:  Gap in medication. Overdue lab - ALT

## 2021-11-01 RX ORDER — ATENOLOL 25 MG/1
25 TABLET ORAL DAILY
Qty: 90 TABLET | Refills: 0 | Status: SHIPPED | OUTPATIENT
Start: 2021-11-01 | End: 2022-02-10

## 2021-11-01 RX ORDER — EZETIMIBE 10 MG/1
10 TABLET ORAL DAILY
Qty: 90 TABLET | Refills: 0 | Status: SHIPPED | OUTPATIENT
Start: 2021-11-01 | End: 2022-02-10

## 2021-11-01 NOTE — TELEPHONE ENCOUNTER
"Requested Prescriptions   Pending Prescriptions Disp Refills     ezetimibe (ZETIA) 10 MG tablet [Pharmacy Med Name: EZETIMIBE 10MG TABS] 90 tablet 0     Sig: Take 1 tablet (10 mg) by mouth daily       Antihyperlipidemic agents Failed - 11/1/2021 10:25 AM        Failed - Normal serum ALT on record in past 12 mos     Recent Labs   Lab Test 12/23/19  1544   ALT 36             Passed - Lipid panel on file in past 12 mos     Recent Labs   Lab Test 09/22/21  1135 01/10/17  0746 02/06/15  0735   CHOL 124   < > 146   TRIG 80   < > 110   HDL 52   < > 44   LDL 56   < > 80   NHDL 72   < >  --    VLDL  --   --  22   CHOLHDLRATIO  --   --  3.3    < > = values in this interval not displayed.               Passed - Recent (12 mo) or future (30 days) visit within the authorizing provider's specialty     Patient has had an office visit with the authorizing provider or a provider within the authorizing providers department within the previous 12 mos or has a future within next 30 days. See \"Patient Info\" tab in inbasket, or \"Choose Columns\" in Meds & Orders section of the refill encounter.              Passed - Medication is active on med list        Passed - Patient is age 18 years or older         One mariza refill given. Has appt on 11/26. Note in chart for ALT.  Morena Perez RN    "

## 2021-11-01 NOTE — TELEPHONE ENCOUNTER
"Requested Prescriptions   Pending Prescriptions Disp Refills     atenolol (TENORMIN) 25 MG tablet [Pharmacy Med Name: ATENOLOL 25MG TABS] 30 tablet      Sig: TAKE 1 TABLET (25 MG) BY MOUTH DAILY *PLEASE SCHEDULE CLINIC APPT. FOR REFILLS *       Beta-Blockers Protocol Passed - 11/1/2021 10:26 AM        Passed - Blood pressure under 140/90 in past 12 months     BP Readings from Last 3 Encounters:   06/02/21 122/75   05/12/21 136/79   02/01/21 (!) 144/80                 Passed - Patient is age 6 or older        Passed - Recent (12 mo) or future (30 days) visit within the authorizing provider's specialty     Patient has had an office visit with the authorizing provider or a provider within the authorizing providers department within the previous 12 mos or has a future within next 30 days. See \"Patient Info\" tab in inbasket, or \"Choose Columns\" in Meds & Orders section of the refill encounter.              Passed - Medication is active on med list           Has appt 11/26. Will have lab draw then. One mariza refill given on Sanjuanita Perez RN    "

## 2021-11-15 DIAGNOSIS — E78.00 HYPERCHOLESTEREMIA: ICD-10-CM

## 2021-11-16 RX ORDER — ROSUVASTATIN CALCIUM 40 MG/1
40 TABLET, COATED ORAL DAILY
Qty: 30 TABLET | Refills: 0 | Status: SHIPPED | OUTPATIENT
Start: 2021-11-16 | End: 2021-12-21

## 2021-11-16 NOTE — TELEPHONE ENCOUNTER
Last Clinic Visit: 4/6/2020  [facility]  Future Visit: 11/26/2021

## 2021-12-17 DIAGNOSIS — E78.00 HYPERCHOLESTEREMIA: ICD-10-CM

## 2021-12-21 RX ORDER — ROSUVASTATIN CALCIUM 40 MG/1
40 TABLET, COATED ORAL DAILY
Qty: 45 TABLET | Refills: 0 | Status: SHIPPED | OUTPATIENT
Start: 2021-12-21 | End: 2022-02-10

## 2022-01-24 DIAGNOSIS — I25.10 CORONARY ARTERY DISEASE INVOLVING NATIVE CORONARY ARTERY OF NATIVE HEART WITHOUT ANGINA PECTORIS: ICD-10-CM

## 2022-01-24 DIAGNOSIS — E78.00 HYPERCHOLESTEREMIA: ICD-10-CM

## 2022-01-28 RX ORDER — ATENOLOL 25 MG/1
25 TABLET ORAL DAILY
Qty: 30 TABLET | Refills: 0 | OUTPATIENT
Start: 2022-01-28

## 2022-01-28 RX ORDER — EZETIMIBE 10 MG/1
10 TABLET ORAL DAILY
Qty: 30 TABLET | Refills: 0 | OUTPATIENT
Start: 2022-01-28

## 2022-01-28 NOTE — TELEPHONE ENCOUNTER
Patient advised appt needed in Oct.   He has cancelled three appointments since that date.   Refills refused. Will need to have PCP refill unless seen .    Morena Perez RN  Medical Speciality Care Coordinator  [facility], [place]  Phone: [phone]

## 2022-01-28 NOTE — TELEPHONE ENCOUNTER
EZETIMIBE 10MG TABS     Last Written Prescription Date:  11/1/21  Last Fill Quantity: 90,   # refills: 0  Last Office Visit : 4/6/20  Future Office visit:  none    Routing refill request to provider for review/approval because:  Overdue for appt by >18mo    ATENOLOL 25MG TABS     Last Written Prescription Date:  11/1/21  Last Fill Quantity: 90,   # refills: 0      Thank-you, Kacie EDMONDS RN Medication Refill Team

## 2022-02-04 DIAGNOSIS — E78.00 HYPERCHOLESTEREMIA: ICD-10-CM

## 2022-02-07 NOTE — TELEPHONE ENCOUNTER
rosuvastatin (CRESTOR) 40 MG tablet    Take 1 tablet (40 mg) by mouth daily     Last Written Prescription Date:  12/21/21  Last Fill Quantity: 45,   # refills: 0  Last Office Visit : 4/6/20  Future Office visit:  none    Routing refill request to provider for review/approval because:  Overdue visit. Previous mariza.   Lab Test 09/22/21  1135   LDL 56

## 2022-02-10 ENCOUNTER — VIRTUAL VISIT (OUTPATIENT)
Dept: CARDIOLOGY | Facility: CLINIC | Age: 62
End: 2022-02-10
Payer: COMMERCIAL

## 2022-02-10 DIAGNOSIS — I25.10 CORONARY ARTERY DISEASE INVOLVING NATIVE CORONARY ARTERY OF NATIVE HEART WITHOUT ANGINA PECTORIS: ICD-10-CM

## 2022-02-10 DIAGNOSIS — E78.00 HYPERCHOLESTEREMIA: ICD-10-CM

## 2022-02-10 PROCEDURE — 99215 OFFICE O/P EST HI 40 MIN: CPT | Mod: 95 | Performed by: INTERNAL MEDICINE

## 2022-02-10 RX ORDER — EZETIMIBE 10 MG/1
10 TABLET ORAL DAILY
Qty: 90 TABLET | Refills: 3 | Status: SHIPPED | OUTPATIENT
Start: 2022-02-10 | End: 2023-02-02

## 2022-02-10 RX ORDER — ROSUVASTATIN CALCIUM 40 MG/1
40 TABLET, COATED ORAL DAILY
OUTPATIENT
Start: 2022-02-10

## 2022-02-10 RX ORDER — ROSUVASTATIN CALCIUM 40 MG/1
40 TABLET, COATED ORAL DAILY
Qty: 90 TABLET | Refills: 3 | Status: SHIPPED | OUTPATIENT
Start: 2022-02-10 | End: 2023-02-02

## 2022-02-10 RX ORDER — ATENOLOL 25 MG/1
25 TABLET ORAL DAILY
Qty: 90 TABLET | Refills: 3 | Status: SHIPPED | OUTPATIENT
Start: 2022-02-10 | End: 2023-02-02

## 2022-02-10 NOTE — PROGRESS NOTES
Tremayne is a 61 year old who is being evaluated via a billable video visit.      How would you like to obtain your AVS? [employer]  If the video visit is dropped, the invitation should be resent by: Text to cell phone: [phone]  Will anyone else be joining your video visit? No    See note below

## 2022-02-10 NOTE — PATIENT INSTRUCTIONS
1.  Followup with Dr. West if available in one year with labs prior  2.  If Dr. West not available, then virtual video visit followup one year with labs prior

## 2022-02-10 NOTE — PROGRESS NOTES
"The patient has been notified of following:     \"This video visit will be conducted via a call between you and your physician/provider. We have found that certain health care needs can be provided without the need for an in-person physical exam.  This service lets us provide the care you need with a video conversation.  If a prescription is necessary we can send it directly to your pharmacy.  If lab work is needed we can place an order for that and you can then stop by our lab to have the test done at a later time.    Video visits are billed at different rates depending on your insurance coverage.  Please reach out to your insurance provider with any questions.    If during the course of the call the physician/provider feels a video visit is not appropriate, you will not be charged for this service.\"    Patient has given verbal consent for video visit? Yes    How would you like to obtain your AVS? Mail    Video-Visit Details    Type of service:  Video Visit    Video Start Time:1006am    Video End Time:1024am    Total visit time including video visit, chart review, charting, coordination of care =40min    Originating Location (pt. Location):patient home      Distant Location (provider location):  home office    Platform used for Video Visit: Cornel    See dictation #[id number]    CSN#:[id number]  "

## 2022-02-10 NOTE — PROGRESS NOTES
Visit Date: 02/10/2022    D: 02/10/2022   T: 02/10/2022   MT: JEMMT    Name:     TREMAYNE GALARZA  MRN:      [id number]        Account:    [id number]   :      1960           Visit Date: 02/10/2022     Document: [id number]    Dear Ms. Xavier:      It was a pleasure participating in the care of your patient, Mr. Tremayne Galarza.  As you know, he is a 61-year-old gentleman who I see today in place of his primary cardiologist, Dr. Tomás West, for coronary artery disease, hypertension and hyperlipidemia.    PAST MEDICAL HISTORY:      1.  Hypertension.  2.  Hyperlipidemia.  3.  Skin cancer.  4.  Perianal abscess.  5.  Anal fistula.  6.  Alcohol abuse.  7.  Obesity.  8.  Allergic rhinitis.    His cardiac history is significant for known coronary disease and normal LV systolic function.  Back in , the patient began experiencing tingling in his lungs during a kickboxing workout at [employer].  This eventually led to a prescription of an inhaler, which did not help and a positive stress echo that was performed on 2014.  At that time, his ejection fraction was normal at 60%, but there was lack of augmentation of EF during peak exercise and anteroapical hypokinesis was suggested.    Coronary angiography was performed at [facility] on 2014 and he had a drug-eluting stent placed to the proximal right coronary artery with 2 another drug-eluting stents placed in the mid right coronary territory.    He then saw Dr. West after he switched to therapy for his care and Dr. West performed coronary angiography on 2015 after reviewing the films from [facility].      The angiogram 2015 revealed the following:      -- Left main short with separate ostia of the LAD and circumflex.  -- LAD long 60% stenosis in the mid portion with an FFR of 0.78.  -- D2 50% ostial stenosis.  -- Circumflex mild diffuse disease.  -- RCA minimal in-stent restenosis with a 30%-40% proximal lesion.    Medical  therapy was recommended and the borderline LAD lesion was medically treated, but Dr. West stated that if angina returned, then JOSE of the mid LAD could be considered to relieve any type of recurrent symptoms.    He last saw Dr. West 04/06/2020 and at that time, he was doing well.  He presents today for continuing care.    Since his last visit, the patient has done well.  He is active and walks on the treadmill 3 times a week for 30 minutes and goes about 3 miles during that time.  He walks with his wife, does pushups and situps and denies any new chest pain, shortness of breath or exertional limitation.  Similarly, he denies any other PND, orthopnea, edema, palpitations, syncope or near syncope.  He denies any black or bloody stools or nosebleeds.  His blood pressures have been stable at 115/89 and his weight has been stable at 207.    In terms of cardiac risk factors, no history of diabetes.  He does have a history of hypertension.  He does not smoke and does not drink currently.  His mother had a stroke at age 38.  He does have hyperlipidemia.  He works for [employer].    SOCIAL HISTORY:  He is looking forward to his 62th birthday when he is going to have a [employer] type of hockey party, as he grew up playing hockey on street in [place] and he is a big [place] [employer] fan.    CURRENT MEDICATIONS:      1.  Atenolol 25 mg a day.  2.  Zetia 10 mg a day.  3.  Crestor 40 mg a day.  4.  Viagra.    PHYSICAL EXAMINATION:      VITAL SIGNS:  Blood pressure is 115/89 with a pulse of 55.  His weight is 207 pounds.  GENERAL:  He appears comfortable, well groomed.  PSYCHIATRIC:  He is alert and oriented x3.  HEENT:  His eyes do not appear grossly erythematous or have exudate.  RESPIRATORY:  He is breathing comfortably without gross cough.    The remainder of the comprehensive physical exam was deferred secondary to the COVID-19 pandemic and secondary to video visit restrictions.    LABORATORY:   "Labs 09/22/2021, LDL 56.  On 06/11/2020 potassium 4.3, GFR normal, hemoglobin normal.    Stress echo in 2014 revealed a resting LV ejection fraction of 60%, which did not improve appropriately with stress exercise.      IMPRESSION:      Tremayne is a 61-year-old gentleman who has several active cardiac issues:    1.  History of coronary artery disease.    He had 3 stents placed in the right coronary artery back in 2014 for symptoms of \"tingling in his lungs.\"  A positive stress test at that time detected lack of augmentation of LV systolic function.    Since revascularization, he has had no further symptoms and he is currently exertionally unlimited.    His last coronary angiogram 03/26/2015 by Dr. West detected minimal in-stent restenosis; however, he did have a borderline stenosis in the mid LAD with an FFR of 0.78.  He also has 50% ostial stenosis of the second diagonal branch that were medically treated.    Dr. West mentioned that if the patient ever did have recurrent symptoms, that consideration for revascularization of the mid LAD could be considered if needed, but currently the patient is completely asymptomatic and continued clinical monitoring would be indicated.    2.  Hypertension, well controlled.      Blood pressures are running 115/89.  Continue to monitor.    3.  Hyperlipidemia, well controlled.      LDL is currently 56 on his current regimen of rosuvastatin 40 and ezetimibe 10 mg.  Continue to follow.      PLAN:      1.  The patient for some reason was not taking a baby aspirin and he denies any significant bleeding contraindications, so he will restart baby aspirin 81 mg enteric coated every day in addition to his current medications.    2.  If Dr. West is available in 1 year, then he will follow up with him with labs prior.      Otherwise, he can follow up with me in a year via virtual video visit with labs prior, earlier if needed.    Once again, it was a pleasure participating in the care of " your patient, Mr. Tremayne Galarza.  Please feel free to contact me at any time if any questions regarding his care in the future.        Daniel Bridges MD        D: 02/10/2022   T: 02/10/2022   MT: MALOU    Name:     TREMAYNE GALARZA  MRN:      [id number]        Account:    [id number]   :      1960           Visit Date: 02/10/2022     Document: [id number]    cc:  [place] NP

## 2022-02-10 NOTE — NURSING NOTE
Chief Complaint   Patient presents with     Follow Up     transfer care from Dr. West       Patient and Provider completed appt prior to being checked in. Contacted patient and stated appt is finished. Patient did complete his echeckin prior.    Leticia Patrick, Virtual Facilitator

## 2022-03-30 NOTE — PROGRESS NOTES
[facility] Dermatology Note  Encounter Date: Apr 5, 2022  Office Visit     Dermatology Problem List:  0. NUB - right forearm - bx 4/5/22  1. 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 Compound nevus with severe dysplasia, left chest  -s/p excision 1/15/2016  3. Eczema, left shin  -Previous Tx:  triamcinolone (initiated 1/4/2016)  4. Subcutaneous nodules  -current tx: doxycycline, mupirocin, BPO wash, Bactroban  -s/p expression 03/03/20  -s/p bacterial culture 03/03/20  5. AKs  -s/p cryotherapy  6. History of benign biopsy  - intradermal melanocytic nevus, left posterior thigh, s/p bx 3/30/21  ____________________________________________     Assessment & Plan:     # History of NMSC. No evidence of recurrence.   - Recommend sunscreens SPF #30 or greater, protective clothing and avoidance of tanning beds.   - Recommended next skin check in 6 months.     # History of DN: No evidence of recurrence. While these are benign, they are a marker for increased melanoma risk - no repgimentation  - Recommended next skin check in 6 months.    # Neoplasm of uncertain behavior on the right forearm. The differential diagnosis includes SK vs SCC.    - See procedure note.     # Actinic keratosis - left cheek x 3 and left dorsal hand x 1.   - See cryo procedure note.        Procedures Performed:   - Shave biopsy procedure note, location(s): right forearm. After discussion of benefits and risks including but not limited to bleeding, infection, scar, incomplete removal, recurrence, and non-diagnostic biopsy, written consent and photographs were obtained. The area was cleaned with isopropyl alcohol. 0.5mL of 1% lidocaine with epinephrine was injected to obtain adequate anesthesia of lesion(s). Shave biopsy at site(s) performed. Hemostasis was achieved with aluminium chloride. Petrolatum ointment and a sterile dressing  were applied. The patient tolerated the procedure and no complications were noted. The patient was provided with verbal and written post care instructions.     - Cryotherapy procedure note, location(s): left cheek x 3 and left dorsal hand x 1. After verbal consent and discussion of risks and benefits including, but not limited to, dyspigmentation/scar, blister, and pain, 4 AKs was(were) treated with 1-2 mm freeze border for 1-2 cycles with liquid nitrogen. Post cryotherapy instructions were provided.    Follow-up: 6 month(s) in-person, or earlier for new or changing lesions    Staff and Scribe:     Scribe Disclosure:   I, Jani Hutchison, am serving as a scribe to document services personally performed by this physician, Dr. Radha Orona, based on data collection and the provider's statements to me.     Provider Disclosure:   The documentation recorded by the scribe accurately reflects the services I personally performed and the decisions made by me.    Radha Orona MD    Department of Dermatology  [facility]: Phone: [phone], Fax:[phone]  [facility]: Phone: [phone], Fax: [phone]      ____________________________________________    CC: Derm Problem (6 month skin check, hx of  NMSC, no areas of concern)    HPI:  Mr. Tremayne Galarza is a(n) 62 year old male who presents today as a return patient for a skin check.    Last seen on 10/12/21 for a skin check. No concerning lesions were noted.     Today, he has no areas of concern.    Patient is otherwise feeling well, without additional skin concerns.    Labs Reviewed:  N/A    Physical Exam:  Vitals: There were no vitals taken for this visit.  SKIN: Total skin excluding the undergarment areas was performed. The exam included the head/face, neck, both arms, chest, back, abdomen, both legs, digits and/or nails.   - Declines buttock  or genital exam.  - Well healed scars on previous sites of NMSC and DN.   - 1.2 cm irregualrly shaped scaly patch right forearm  - There is an erythematous macule with overyling adherent scale on the left cheek x 3 and left dorsal hand x 1.   - No other lesions of concern on areas examined.     Medications:  Current Outpatient Medications   Medication     atenolol (TENORMIN) 25 MG tablet     ezetimibe (ZETIA) 10 MG tablet     fexofenadine (ALLEGRA) 60 MG tablet     rosuvastatin (CRESTOR) 40 MG tablet     sildenafil (VIAGRA) 50 MG tablet     No current facility-administered medications for this visit.      Past Medical History:   Patient Active Problem List   Diagnosis     Chronic ischemic heart disease     Benign neoplasm of colon     Hyperlipidemia with target LDL less than 100     HTN, goal below 140/90     Lesion of plantar nerve     Obesity     Allergic rhinitis     Postsurgical percutaneous transluminal coronary angioplasty status     Nondependent alcohol abuse     Status post coronary angiogram     CAD (coronary artery disease)     LUQ abdominal pain     Verruca plantaris     Perianal abscess     Anal fistula     Staph infection     History of nonmelanoma skin cancer     History of dysplastic nevus     Past Medical History:   Diagnosis Date     Chronic ischemic heart disease 9/4/2014    Overview:  3 stents to RCA 8/2014      Essential hypertension 2/11/2005    Overview:  LW Onset:  13Tta05      Fistula      Hyperlipidemia 11/14/2005    Overview:  LW Onset:  44Ohz80      Perirectal abscess      Staph infection         CC No referring provider defined for this encounter. on close of this encounter.

## 2022-04-05 ENCOUNTER — OFFICE VISIT (OUTPATIENT)
Dept: DERMATOLOGY | Facility: CLINIC | Age: 62
End: 2022-04-05
Payer: COMMERCIAL

## 2022-04-05 DIAGNOSIS — D48.5 NEOPLASM OF UNCERTAIN BEHAVIOR OF SKIN: ICD-10-CM

## 2022-04-05 DIAGNOSIS — Z86.018 HISTORY OF DYSPLASTIC NEVUS: ICD-10-CM

## 2022-04-05 DIAGNOSIS — Z85.828 HISTORY OF NONMELANOMA SKIN CANCER: Primary | ICD-10-CM

## 2022-04-05 DIAGNOSIS — L57.0 AK (ACTINIC KERATOSIS): ICD-10-CM

## 2022-04-05 PROCEDURE — 11102 TANGNTL BX SKIN SINGLE LES: CPT | Performed by: DERMATOLOGY

## 2022-04-05 PROCEDURE — 17003 DESTRUCT PREMALG LES 2-14: CPT | Performed by: DERMATOLOGY

## 2022-04-05 PROCEDURE — 17000 DESTRUCT PREMALG LESION: CPT | Mod: XS | Performed by: DERMATOLOGY

## 2022-04-05 PROCEDURE — 88305 TISSUE EXAM BY PATHOLOGIST: CPT | Performed by: DERMATOLOGY

## 2022-04-05 NOTE — LETTER
4/5/2022         RE: Tremayne Galarza  [address] [address]        Dear Colleague,    Thank you for referring your patient, Tremayne Galarza, to the [facility]. Please see a copy of my visit note below.    [facility] Dermatology Note  Encounter Date: Apr 5, 2022  Office Visit     Dermatology Problem List:  0. NUB - right forearm - bx 4/5/22  1. 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 Compound nevus with severe dysplasia, left chest  -s/p excision 1/15/2016  3. Eczema, left shin  -Previous Tx:  triamcinolone (initiated 1/4/2016)  4. Subcutaneous nodules  -current tx: doxycycline, mupirocin, BPO wash, Bactroban  -s/p expression 03/03/20  -s/p bacterial culture 03/03/20  5. AKs  -s/p cryotherapy  6. History of benign biopsy  - intradermal melanocytic nevus, left posterior thigh, s/p bx 3/30/21  ____________________________________________     Assessment & Plan:     # History of NMSC. No evidence of recurrence.   - Recommend sunscreens SPF #30 or greater, protective clothing and avoidance of tanning beds.   - Recommended next skin check in 6 months.     # History of DN: No evidence of recurrence. While these are benign, they are a marker for increased melanoma risk - no repgimentation  - Recommended next skin check in 6 months.    # Neoplasm of uncertain behavior on the right forearm. The differential diagnosis includes SK vs SCC.    - See procedure note.     # Actinic keratosis - left cheek x 3 and left dorsal hand x 1.   - See cryo procedure note.        Procedures Performed:   - Shave biopsy procedure note, location(s): right forearm. After discussion of benefits and risks including but not limited to bleeding, infection, scar, incomplete removal, recurrence, and non-diagnostic biopsy, written consent and photographs were obtained. The area was cleaned  with isopropyl alcohol. 0.5mL of 1% lidocaine with epinephrine was injected to obtain adequate anesthesia of lesion(s). Shave biopsy at site(s) performed. Hemostasis was achieved with aluminium chloride. Petrolatum ointment and a sterile dressing were applied. The patient tolerated the procedure and no complications were noted. The patient was provided with verbal and written post care instructions.     - Cryotherapy procedure note, location(s): left cheek x 3 and left dorsal hand x 1. After verbal consent and discussion of risks and benefits including, but not limited to, dyspigmentation/scar, blister, and pain, 4 AKs was(were) treated with 1-2 mm freeze border for 1-2 cycles with liquid nitrogen. Post cryotherapy instructions were provided.    Follow-up: 6 month(s) in-person, or earlier for new or changing lesions    Staff and Scribe:     Scribe Disclosure:   I, Jani Hutchison, am serving as a scribe to document services personally performed by this physician, Dr. Radha Orona, based on data collection and the provider's statements to me.     Provider Disclosure:   The documentation recorded by the scribe accurately reflects the services I personally performed and the decisions made by me.    Radha Orona MD    Department of Dermatology  [facility]: Phone: [phone], Fax:[phone]  [facility]: Phone: [phone], Fax: [phone]      ____________________________________________    CC: Derm Problem (6 month skin check, hx of  NMSC, no areas of concern)    HPI:  Mr. Tremayne Galarza is a(n) 62 year old male who presents today as a return patient for a skin check.    Last seen on 10/12/21 for a skin check. No concerning lesions were noted.     Today, he has no areas of concern.    Patient is otherwise feeling well, without additional skin concerns.    Labs Reviewed:  N/A    Physical  Exam:  Vitals: There were no vitals taken for this visit.  SKIN: Total skin excluding the undergarment areas was performed. The exam included the head/face, neck, both arms, chest, back, abdomen, both legs, digits and/or nails.   - Declines buttock or genital exam.  - Well healed scars on previous sites of NMSC and DN.   - 1.2 cm irregualrly shaped scaly patch right forearm  - There is an erythematous macule with overyling adherent scale on the left cheek x 3 and left dorsal hand x 1.   - No other lesions of concern on areas examined.     Medications:  Current Outpatient Medications   Medication     atenolol (TENORMIN) 25 MG tablet     ezetimibe (ZETIA) 10 MG tablet     fexofenadine (ALLEGRA) 60 MG tablet     rosuvastatin (CRESTOR) 40 MG tablet     sildenafil (VIAGRA) 50 MG tablet     No current facility-administered medications for this visit.      Past Medical History:   Patient Active Problem List   Diagnosis     Chronic ischemic heart disease     Benign neoplasm of colon     Hyperlipidemia with target LDL less than 100     HTN, goal below 140/90     Lesion of plantar nerve     Obesity     Allergic rhinitis     Postsurgical percutaneous transluminal coronary angioplasty status     Nondependent alcohol abuse     Status post coronary angiogram     CAD (coronary artery disease)     LUQ abdominal pain     Verruca plantaris     Perianal abscess     Anal fistula     Staph infection     History of nonmelanoma skin cancer     History of dysplastic nevus     Past Medical History:   Diagnosis Date     Chronic ischemic heart disease 9/4/2014    Overview:  3 stents to RCA 8/2014      Essential hypertension 2/11/2005    Overview:  LW Onset:  11Ytj09      Fistula      Hyperlipidemia 11/14/2005    Overview:  LW Onset:  09Hmx02      Perirectal abscess      Staph infection         CC No referring provider defined for this encounter. on close of this encounter.      Again, thank you for allowing me to participate in the care of  your patient.        Sincerely,        Radha Orona MD

## 2022-04-05 NOTE — NURSING NOTE
The following medication was given:     MEDICATION:  Lidocaine with epinephrine 1% [id number]  ROUTE: SQ  SITE: see procedure note  DOSE: 0.5 mL  LOT #: [id number]  : [employer]  EXPIRATION DATE: 06/01/2022  NDC#: 4177-7767-72  Was there drug waste? 1.5 mL  Multi-dose vial: Yes    Cece Dela Cruz CMA  April 5, 2022

## 2022-04-05 NOTE — PATIENT INSTRUCTIONS
Cryotherapy    What is it?    Use of a very cold liquid, such as liquid nitrogen, to freeze and destroy abnormal skin cells that need to be removed    What should I expect?    Tenderness and redness    A small blister that might grow and fill with dark purple blood. There may be crusting.    More than one treatment may be needed if the lesions do not go away.    How do I care for the treated area?    Gently wash the area with your hands when bathing.    Use a thin layer of Vaseline to help with healing. You may use a Band-Aid.     The area should heal within 7-10 days and may leave behind a pink or lighter color.     Do not use an antibiotic or Neosporin ointment.     You may take acetaminophen (Tylenol) for pain.     Call your doctor if you have:    Severe pain    Signs of infection (warmth, redness, cloudy yellow drainage, and or a bad smell)    Questions or concerns    Who should I call with questions?       [facility]: [phone]       [facility]: [phone]       For urgent needs outside of business hours call the [facility] at [phone] and ask for the dermatology resident on call    Wound Care After a Biopsy    What is a skin biopsy?  A skin biopsy allows the doctor to examine a very small piece of tissue under the microscope to determine the diagnosis and the best treatment for the skin condition. A local anesthetic (numbing medicine)  is injected with a very small needle into the skin area to be tested. A small piece of skin is taken from the area. Sometimes a suture (stitch) is used.     What are the risks of a skin biopsy?  I will experience scar, bleeding, swelling, pain, crusting and redness. I may experience incomplete removal or recurrence. Risks of this procedure are excessive bleeding, bruising, infection, nerve damage, numbness, thick (hypertrophic or keloidal) scar and non-diagnostic biopsy.    How should I care for my  wound for the first 24 hours?    Keep the wound dry and covered for 24 hours    If it bleeds, hold direct pressure on the area for 15 minutes. If bleeding does not stop then go to the emergency room    Avoid strenuous exercise the first 1-2 days or as your doctor instructs you    How should I care for the wound after 24 hours?    After 24 hours, remove the bandage    You may bathe or shower as normal    If you had a scalp biopsy, you can shampoo as usual and can use shower water to clean the biopsy site daily    Clean the wound twice a day with gentle soap and water    Do not scrub, be gentle    Apply white petroleum/Vaseline after cleaning the wound with a cotton swab or a clean finger, and keep the site covered with a Bandaid /bandage. Bandages are not necessary with a scalp biopsy    If you are unable to cover the site with a Bandaid /bandage, re-apply ointment 2-3 times a day to keep the site moist. Moisture will help with healing    Avoid strenuous activity for first 1-2 days    Avoid lakes, rivers, pools, and oceans until the stitches are removed or the site is healed    How do I clean my wound?    Wash hands thoroughly with soap or use hand  before all wound care    Clean the wound with gentle soap and water    Apply white petroleum/Vaseline  to wound after it is clean    Replace the Bandaid /bandage to keep the wound covered for the first few days or as instructed by your doctor    If you had a scalp biopsy, warm shower water to the area on a daily basis should suffice    What should I use to clean my wound?     Cotton-tipped applicators (Qtips )    White petroleum jelly (Vaseline ). Use a clean new container and use Q-tips to apply.    Bandaids   as needed    Gentle soap     How should I care for my wound long term?    Do not get your wound dirty    Keep up with wound care for one week or until the area is healed.    A small scab will form and fall off by itself when the area is completely healed.  The area will be red and will become pink in color as it heals. Sun protection is very important for how your scar will turn out. Sunscreen with an SPF 30 or greater is recommended once the area is healed.    You should have some soreness but it should be mild and slowly go away over several days. Talk to your doctor about using tylenol for pain,    When should I call my doctor?  If you have increased:     Pain or swelling    Pus or drainage (clear or slightly yellow drainage is ok)    Temperature over 100F    Spreading redness or warmth around wound    When will I hear about my results?  The biopsy results can take 2 weeks to come back.  Your results will automatically release to [employer] before your provider has even reviewed them.  The clinic will call you with the results, send you a SmartSignal message, or have you schedule a follow-up clinic or phone time to discuss the results.  Contact our clinics if you do not hear from us in 2 weeks.    Who should I call with questions?    [facility]: [phone]    [facility]: [phone]    For urgent needs outside of business hours call the [facility] at [phone] and ask for the dermatology resident on call

## 2022-04-05 NOTE — NURSING NOTE
Tremayne Galarza's goals for this visit include:   Chief Complaint   Patient presents with     Derm Problem     6 month skin check, hx of  NMSC, no areas of concern       He requests these members of his care team be copied on today's visit information: no    PCP: Mee Park    Referring Provider:  No referring provider defined for this encounter.    There were no vitals taken for this visit.    Do you need any medication refills at today's visit? No    Rosalva Amezcua LPN

## 2022-04-07 LAB
PATH REPORT.COMMENTS IMP SPEC: NORMAL
PATH REPORT.COMMENTS IMP SPEC: NORMAL
PATH REPORT.FINAL DX SPEC: NORMAL
PATH REPORT.GROSS SPEC: NORMAL
PATH REPORT.MICROSCOPIC SPEC OTHER STN: NORMAL
PATH REPORT.RELEVANT HX SPEC: NORMAL

## 2022-04-15 ENCOUNTER — IMMUNIZATION (OUTPATIENT)
Dept: NURSING | Facility: CLINIC | Age: 62
End: 2022-04-15
Payer: COMMERCIAL

## 2022-04-15 PROCEDURE — 91305 COVID-19,PF,PFIZER (12+ YRS): CPT

## 2022-04-15 PROCEDURE — 0054A COVID-19,PF,PFIZER (12+ YRS): CPT

## 2022-05-10 DIAGNOSIS — N52.9 ERECTILE DYSFUNCTION, UNSPECIFIED ERECTILE DYSFUNCTION TYPE: ICD-10-CM

## 2022-05-11 RX ORDER — SILDENAFIL 50 MG/1
50 TABLET, FILM COATED ORAL DAILY PRN
Qty: 10 TABLET | Refills: 2 | Status: SHIPPED | OUTPATIENT
Start: 2022-05-11 | End: 2023-02-21

## 2022-05-11 NOTE — TELEPHONE ENCOUNTER
sildenafil (VIAGRA) 50 MG    Last Written Prescription Date:  11/28/20  Last Fill Quantity: 10,   # refills: 0  Last Office Visit : 2/10/22  Future Office visit:  2/14/23  Routing refill request to provider for review/approval because:  Drug not on the refill protocol

## 2022-05-11 NOTE — TELEPHONE ENCOUNTER
"Requested Prescriptions   Pending Prescriptions Disp Refills     sildenafil (VIAGRA) 50 MG tablet [Pharmacy Med Name: SILDENAFIL CITRATE 50MG TABS] 10 tablet 0     Sig: TAKE 1 TABLET (50 MG) BY MOUTH DAILY AS NEEDED       Erectile Dysfuction Protocol Passed - 5/11/2022  3:08 PM        Passed - Absence of nitrates on medication list        Passed - Absence of Alpha Blockers on Med list        Passed - Recent (12 mo) or future (30 days) visit within the authorizing provider's specialty     Patient has had an office visit with the authorizing provider or a provider within the authorizing providers department within the previous 12 mos or has a future within next 30 days. See \"Patient Info\" tab in inbasket, or \"Choose Columns\" in Meds & Orders section of the refill encounter.              Passed - Medication is active on med list        Passed - Patient is age 18 or older          Refill passed    Morena Perez RN  Cardiology Care Coordinator  [facility]  Phone: [phone]      "

## 2022-05-12 NOTE — TELEPHONE ENCOUNTER
Daniel Bridges MD Balcome, Morena ARAUJO, RN  Hi Leyla,     Would forward Viagra refill to primary MD       Received above message from Dr Bridges. Called pharmacy and cancelled refill. Asked them to send refill to his PCP, Cece Park.     Morena Perez, RN  Cardiology Care Coordinator  [facility]  Phone: [phone]

## 2022-05-15 ENCOUNTER — HEALTH MAINTENANCE LETTER (OUTPATIENT)
Age: 62
End: 2022-05-15

## 2022-06-17 DIAGNOSIS — N52.9 ERECTILE DYSFUNCTION, UNSPECIFIED ERECTILE DYSFUNCTION TYPE: ICD-10-CM

## 2022-06-17 RX ORDER — SILDENAFIL 50 MG/1
50 TABLET, FILM COATED ORAL DAILY PRN
Qty: 10 TABLET | Refills: 2 | Status: CANCELLED | OUTPATIENT
Start: 2022-06-17

## 2022-06-20 NOTE — TELEPHONE ENCOUNTER
Left VM stating patient will need a in person visit for his requested prescription as he has not been seen since 2020.  Advised patient to call clinic to schedule.

## 2022-09-11 ENCOUNTER — HEALTH MAINTENANCE LETTER (OUTPATIENT)
Age: 62
End: 2022-09-11

## 2022-10-26 ENCOUNTER — IMMUNIZATION (OUTPATIENT)
Dept: FAMILY MEDICINE | Facility: CLINIC | Age: 62
End: 2022-10-26
Payer: COMMERCIAL

## 2022-10-26 DIAGNOSIS — Z23 NEED FOR IMMUNIZATION AGAINST INFLUENZA: Primary | ICD-10-CM

## 2022-10-26 PROCEDURE — 90471 IMMUNIZATION ADMIN: CPT

## 2022-10-26 PROCEDURE — 90682 RIV4 VACC RECOMBINANT DNA IM: CPT

## 2022-10-26 PROCEDURE — 99207 PR NO CHARGE NURSE ONLY: CPT

## 2023-01-19 ENCOUNTER — LAB (OUTPATIENT)
Dept: LAB | Facility: CLINIC | Age: 63
End: 2023-01-19
Payer: COMMERCIAL

## 2023-01-19 DIAGNOSIS — E78.00 HYPERCHOLESTEREMIA: ICD-10-CM

## 2023-01-19 DIAGNOSIS — I25.10 CORONARY ARTERY DISEASE INVOLVING NATIVE CORONARY ARTERY OF NATIVE HEART WITHOUT ANGINA PECTORIS: ICD-10-CM

## 2023-01-19 LAB
ANION GAP SERPL CALCULATED.3IONS-SCNC: 6 MMOL/L (ref 3–14)
BUN SERPL-MCNC: 14 MG/DL (ref 7–30)
CALCIUM SERPL-MCNC: 9.6 MG/DL (ref 8.5–10.1)
CHLORIDE BLD-SCNC: 108 MMOL/L (ref 94–109)
CHOLEST SERPL-MCNC: 99 MG/DL
CO2 SERPL-SCNC: 28 MMOL/L (ref 20–32)
CREAT SERPL-MCNC: 0.89 MG/DL (ref 0.66–1.25)
ERYTHROCYTE [DISTWIDTH] IN BLOOD BY AUTOMATED COUNT: 11.8 % (ref 10–15)
FASTING STATUS PATIENT QL REPORTED: YES
GFR SERPL CREATININE-BSD FRML MDRD: >90 ML/MIN/1.73M2
GLUCOSE BLD-MCNC: 96 MG/DL (ref 70–99)
HCT VFR BLD AUTO: 42.5 % (ref 40–53)
HDLC SERPL-MCNC: 47 MG/DL
HGB BLD-MCNC: 14.8 G/DL (ref 13.3–17.7)
LDLC SERPL CALC-MCNC: 39 MG/DL
MCH RBC QN AUTO: 32.6 PG (ref 26.5–33)
MCHC RBC AUTO-ENTMCNC: 34.8 G/DL (ref 31.5–36.5)
MCV RBC AUTO: 94 FL (ref 78–100)
NONHDLC SERPL-MCNC: 52 MG/DL
PLATELET # BLD AUTO: 139 10E3/UL (ref 150–450)
POTASSIUM BLD-SCNC: 4.2 MMOL/L (ref 3.4–5.3)
RBC # BLD AUTO: 4.54 10E6/UL (ref 4.4–5.9)
SODIUM SERPL-SCNC: 142 MMOL/L (ref 133–144)
TRIGL SERPL-MCNC: 67 MG/DL
TSH SERPL DL<=0.005 MIU/L-ACNC: 1.98 MU/L (ref 0.4–4)
WBC # BLD AUTO: 4.3 10E3/UL (ref 4–11)

## 2023-01-19 PROCEDURE — 80048 BASIC METABOLIC PNL TOTAL CA: CPT

## 2023-01-19 PROCEDURE — 36415 COLL VENOUS BLD VENIPUNCTURE: CPT

## 2023-01-19 PROCEDURE — 84443 ASSAY THYROID STIM HORMONE: CPT

## 2023-01-19 PROCEDURE — 85027 COMPLETE CBC AUTOMATED: CPT

## 2023-01-19 PROCEDURE — 80061 LIPID PANEL: CPT

## 2023-01-31 DIAGNOSIS — E78.00 HYPERCHOLESTEREMIA: ICD-10-CM

## 2023-01-31 DIAGNOSIS — I25.10 CORONARY ARTERY DISEASE INVOLVING NATIVE CORONARY ARTERY OF NATIVE HEART WITHOUT ANGINA PECTORIS: ICD-10-CM

## 2023-02-02 RX ORDER — ATENOLOL 25 MG/1
25 TABLET ORAL DAILY
Qty: 90 TABLET | Refills: 0 | Status: SHIPPED | OUTPATIENT
Start: 2023-02-02 | End: 2023-02-21

## 2023-02-02 RX ORDER — ROSUVASTATIN CALCIUM 40 MG/1
40 TABLET, COATED ORAL DAILY
Qty: 90 TABLET | Refills: 0 | Status: SHIPPED | OUTPATIENT
Start: 2023-02-02 | End: 2023-02-21

## 2023-02-02 RX ORDER — EZETIMIBE 10 MG/1
10 TABLET ORAL DAILY
Qty: 90 TABLET | Refills: 0 | Status: SHIPPED | OUTPATIENT
Start: 2023-02-02 | End: 2023-02-21

## 2023-02-02 NOTE — TELEPHONE ENCOUNTER
2/10/2022  [facility]  NCV:2-21-23  atenolol (TENORMIN) 25 MG tablet:   overdue BP check, RF 90 day  ezetimibe (ZETIA) 10 MG tablet   overdue lab: ALT, RF 90 day, FYI to clinic

## 2023-02-21 ENCOUNTER — VIRTUAL VISIT (OUTPATIENT)
Dept: CARDIOLOGY | Facility: CLINIC | Age: 63
End: 2023-02-21
Payer: COMMERCIAL

## 2023-02-21 DIAGNOSIS — E78.00 HYPERCHOLESTEREMIA: ICD-10-CM

## 2023-02-21 DIAGNOSIS — I25.10 CORONARY ARTERY DISEASE INVOLVING NATIVE CORONARY ARTERY OF NATIVE HEART WITHOUT ANGINA PECTORIS: ICD-10-CM

## 2023-02-21 DIAGNOSIS — N52.9 ERECTILE DYSFUNCTION, UNSPECIFIED ERECTILE DYSFUNCTION TYPE: ICD-10-CM

## 2023-02-21 PROCEDURE — 99214 OFFICE O/P EST MOD 30 MIN: CPT | Mod: VID | Performed by: INTERNAL MEDICINE

## 2023-02-21 RX ORDER — SILDENAFIL 50 MG/1
50 TABLET, FILM COATED ORAL DAILY PRN
Qty: 90 TABLET | Refills: 0 | Status: SHIPPED | OUTPATIENT
Start: 2023-02-21 | End: 2023-02-21

## 2023-02-21 RX ORDER — ROSUVASTATIN CALCIUM 40 MG/1
40 TABLET, COATED ORAL DAILY
Qty: 90 TABLET | Refills: 3 | Status: SHIPPED | OUTPATIENT
Start: 2023-02-21 | End: 2024-05-29

## 2023-02-21 RX ORDER — EZETIMIBE 10 MG/1
10 TABLET ORAL DAILY
Qty: 90 TABLET | Refills: 3 | Status: SHIPPED | OUTPATIENT
Start: 2023-02-21 | End: 2024-05-28

## 2023-02-21 RX ORDER — ATENOLOL 25 MG/1
25 TABLET ORAL DAILY
Qty: 90 TABLET | Refills: 3 | Status: SHIPPED | OUTPATIENT
Start: 2023-02-21 | End: 2024-04-18

## 2023-02-21 RX ORDER — SILDENAFIL 25 MG/1
25 TABLET, FILM COATED ORAL PRN
Qty: 90 TABLET | Refills: 0 | Status: SHIPPED | OUTPATIENT
Start: 2023-02-21

## 2023-02-21 NOTE — PROGRESS NOTES
Visit Date: 2023    Mee Park NP  [facility]  [address] [address] [place] [address], [address]  [facility] [address]    RE:         TREMAYNE GALARZA.  :     1960  MRNJ#:  [id number]    Dear Ms. Park:     It was a pleasure participating in the care of your patient, Mr. Tremayne Galarza.  As you know, he is a 63-year-old gentleman who I saw today over virtual video visit via [employer] for coronary artery disease, hypertension and hyperlipidemia.    PAST MEDICAL HISTORY:      1.  Hypertension.  2.  Hyperlipidemia.  3.  Skin cancer.  4.  Perianal abscess.  5.  Anal fistula.  6.  Alcohol abuse.  7.  Obesity.  8.  Allergic rhinitis.    His cardiac history is significant for known coronary disease and normal LV systolic function.    In , he experienced tingling in his lungs during a kickboxing workout at [employer] ([employer]  He had a positive stress echo 2014.  There was lack of augmentation of ejection fraction during peak exercise and anteroapical hypokinesis was suggested.    Coronary angiogram at [facility] was performed 2014 and drug-eluting stent was placed to the proximal right coronary artery along with two other drug-eluting stents in the mid right coronary artery.    He saw Dr. West after he switched care and coronary angiography performed by Dr. West on 2015 revealed the following:    Left main:  Short with separate ostia of the LAD and circumflex.  Left anterior descending:  Long 60% stenosis in the mid portion with an FFR of 0.78.  D2:  50% ostial stenosis.  Circumflex:  Mild diffuse disease.  RCA:  Minimal in-stent restenosis with a 30% to 40% proximal lesion.    Medical therapy was recommended and the borderline LAD lesion was medically treated, but Dr. West recommended that if angina returned then JOSE of the mid LAD could be considered to relieve symptoms.    I last saw him 02/10/2022.  At that time, he was doing adequately.  He presents today for continuing care.    Since  "our last visit, he has continued to exercise twice a week on the treadmill at 4 to 4.5 miles an hour.  He also does some exercise programs on the television in which he does weights, stretch bands, aerobic burpees, and step-ups.  He denies any new angina.  He denies chest pain, shortness of breath, chest pressure or exertional limitation.    His blood pressures at home have been stable at 110/79 and his weight at 210 pounds.  He denies other PND, orthopnea, edema, palpitations, syncope or near syncope.  He feels good and has no other gross complaints.    SOCIAL HISTORY:  Today is his [age] birthday, and he is going to work and watch the Wild game as he is a huge [place] [employer] fan.    CURRENT MEDICATIONS:      1.  Atenolol 25 mg a day.  2.  Zetia 10 mg a day.  3.  Rosuvastatin 40 mg a day.  4.  Viagra 50 mg a day.    PHYSICAL EXAMINATION:      VITAL SIGNS:  His blood pressure is 110/79 with a pulse of 65.  GENERAL:  He appears comfortable, well groomed.  PSYCHIATRIC:  He is alert and oriented x 3.  HEENT:  Eyes do not appear grossly erythematous or have exudate.  RESPIRATORY:  He is breathing comfortably without gross cough.    The remainder of the comprehensive physical exam was deferred secondary to the COVID-19 pandemic and secondary to video visit restrictions.    LABORATORY:  01/19/2023:  LDL 39, potassium 4.2, GFR normal, hemoglobin normal, TSH normal.      IMPRESSION:      Tremayne is a 63-year-old gentleman who has several active cardiac issues:    1.  History of coronary artery disease.    He had 3 stents placed in the right coronary artery in 2014 for symptoms of \"tingling in his lungs.\"   A positive stress test noted lack of augmentation of LV systolic function.    After revascularization, he had no further symptoms and is currently exertionally unlimited.    Most recent coronary angiogram in 2015, by Dr. West detected minimal in-stent restenosis; however, he did have a borderline stenosis in the mid " LAD with an FFR of 0.78.  He also has a 50% ostial stenosis of the second diagonal branch that was medically treated.    Dr. West mentioned that if the patient had recurrent symptoms that consideration of revascularization of the mid LAD could be contemplated, but only if needed.  He continues to be asymptomatic and clinical monitoring would be indicated.    2.  Hypertension, well controlled.     Blood pressure is running 110/79.  Continue to monitor.    3.  Hyperlipidemia, well controlled.     LDL is currently less than 70 on rosuvastatin 40 and ezetimibe 10 mg continue to follow.      PLAN:       1.  Continue present medications at present doses.    2.  Virtual video visit followup in 1 year with labs prior, earlier if needed.      Once again, it was a pleasure participating in the care of your patient, . Tremayne Galarza.  Please feel free to contact me anytime if any questions regarding his care in the future.          Daniel Bridges MD        D: 2023   T: 2023   MT: ROGERIO    Name:     TREMAYNE GALARZAJenna  MRN:      [id number]        Account:    [id number]   :      1960           Visit Date: 2023     Document: [id number]

## 2023-02-21 NOTE — PROGRESS NOTES
"The patient has been notified of following:     \"This video visit will be conducted via a call between you and your physician/provider. We have found that certain health care needs can be provided without the need for an in-person physical exam.  This service lets us provide the care you need with a video conversation.  If a prescription is necessary we can send it directly to your pharmacy.  If lab work is needed we can place an order for that and you can then stop by our lab to have the test done at a later time.    Video visits are billed at different rates depending on your insurance coverage.  Please reach out to your insurance provider with any questions.    If during the course of the call the physician/provider feels a video visit is not appropriate, you will not be charged for this service.\"    Patient has given verbal consent for video visit? Yes    How would you like to obtain your AVS? Mail    Video-Visit Details    Type of service:  Video Visit    Video Start Time:947am    Video End Time:1001am    Total visit time including video visit, chart review, charting, coordination of care =35min    Originating Location (pt. Location):patient home      Distant Location (provider location):   Off site home office    Platform used for Video Visit: Cornel    See dictation #[id number]    CSN#:[id number]      "

## 2023-02-21 NOTE — NURSING NOTE
Chief Complaint   Patient presents with     Follow Up     1 year, labs 1/19, pt states also taking multi vit, no other vitals to report today, states last taken at lab appt.     Is the patient currently in the state of [place]? YES    Visit mode:VIDEO    If the visit is dropped, the patient can be reconnected by: VIDEO VISIT: Send to e-mail at: [email or web address]    Will anyone else be joining the visit? NO      How would you like to obtain your AVS? MyChart    Are changes needed to the allergy or medication list? NO    Comments or concerns regarding today's visit: n/a    EDILBERTO Ng/MERA

## 2023-06-03 ENCOUNTER — HEALTH MAINTENANCE LETTER (OUTPATIENT)
Age: 63
End: 2023-06-03

## 2023-12-23 ENCOUNTER — E-VISIT (OUTPATIENT)
Dept: URGENT CARE | Facility: CLINIC | Age: 63
End: 2023-12-23
Payer: COMMERCIAL

## 2023-12-23 ENCOUNTER — OFFICE VISIT (OUTPATIENT)
Dept: URGENT CARE | Facility: URGENT CARE | Age: 63
End: 2023-12-23
Payer: COMMERCIAL

## 2023-12-23 VITALS
DIASTOLIC BLOOD PRESSURE: 89 MMHG | TEMPERATURE: 97.5 F | BODY MASS INDEX: 31.45 KG/M2 | SYSTOLIC BLOOD PRESSURE: 153 MMHG | OXYGEN SATURATION: 95 % | WEIGHT: 231.9 LBS | HEART RATE: 66 BPM

## 2023-12-23 DIAGNOSIS — R06.2 WHEEZING: ICD-10-CM

## 2023-12-23 DIAGNOSIS — J18.9 PNEUMONIA OF BOTH LOWER LOBES DUE TO INFECTIOUS ORGANISM: Primary | ICD-10-CM

## 2023-12-23 DIAGNOSIS — R06.2 WHEEZING: Primary | ICD-10-CM

## 2023-12-23 PROCEDURE — 99207 PR NON-BILLABLE SERV PER CHARTING: CPT | Performed by: NURSE PRACTITIONER

## 2023-12-23 PROCEDURE — 99204 OFFICE O/P NEW MOD 45 MIN: CPT | Performed by: STUDENT IN AN ORGANIZED HEALTH CARE EDUCATION/TRAINING PROGRAM

## 2023-12-23 RX ORDER — PREDNISONE 20 MG/1
40 TABLET ORAL DAILY
Qty: 10 TABLET | Refills: 0 | Status: SHIPPED | OUTPATIENT
Start: 2023-12-23 | End: 2023-12-28

## 2023-12-23 RX ORDER — BENZONATATE 200 MG/1
200 CAPSULE ORAL 3 TIMES DAILY PRN
Qty: 30 CAPSULE | Refills: 0 | Status: SHIPPED | OUTPATIENT
Start: 2023-12-23

## 2023-12-23 RX ORDER — AZITHROMYCIN 250 MG/1
TABLET, FILM COATED ORAL
Qty: 6 TABLET | Refills: 0 | Status: SHIPPED | OUTPATIENT
Start: 2023-12-23 | End: 2023-12-28

## 2023-12-23 NOTE — PATIENT INSTRUCTIONS
Dear Tremayne Galarza,    We are sorry you are not feeling well. Based on the responses you provided, it is recommended that you be seen in-person in urgent care so we can better evaluate your symptoms. Please click here to find the nearest urgent care location to you.   You will not be charged for this Visit. Thank you for trusting us with your care.    SYLVIA Reardon CNP

## 2023-12-23 NOTE — PROGRESS NOTES
Assessment & Plan     Pneumonia of both lower lobes due to infectious organism  Rhonchi and wheezing on exam. Advised treatment for pneumonia with azithromycin and prednisone and close follow up if symptoms persist or are worsening.   - azithromycin (ZITHROMAX) 250 MG tablet  Dispense: 6 tablet; Refill: 0  - predniSONE (DELTASONE) 20 MG tablet  Dispense: 10 tablet; Refill: 0  - benzonatate (TESSALON) 200 MG capsule  Dispense: 30 capsule; Refill: 0    Wheezing  - predniSONE (DELTASONE) 20 MG tablet  Dispense: 10 tablet; Refill: 0  - benzonatate (TESSALON) 200 MG capsule  Dispense: 30 capsule; Refill: 0         No follow-ups on file.    Juliette Hagan, SYLVIA [facility] JUDAHRADHA Casper is a 63 year old male who presents to clinic today for the following health issues:  Chief Complaint   Patient presents with    Urgent Care    URI     URI with cough, mostly coughing at night, been going on for week, can feel in ears     Cough     HPI    Review of Systems  Constitutional, HEENT, cardiovascular, pulmonary, GI, , musculoskeletal, neuro, skin, endocrine and psych systems are negative, except as otherwise noted.      Objective    BP (!) 153/89 (BP Location: Left arm, Patient Position: Sitting, Cuff Size: Adult Large)   Pulse 66   Temp 97.5  F (36.4  C) (Tympanic)   Wt 105.2 kg (231 lb 14.4 oz)   SpO2 95%   BMI 31.45 kg/m    Physical Exam   GENERAL: alert and no distress  EYES: Eyes grossly normal to inspection, PERRL and conjunctivae and sclerae normal  HENT: ear canals and TM's normal, nose and mouth without ulcers or lesions  NECK: no adenopathy, no asymmetry, masses, or scars   RESP: rhonchi bibasilar and expiratory wheezes bibasilar  CV: regular rate and rhythm, normal S1 S2, no S3 or S4, no murmur, click or rub  MS: no gross musculoskeletal defects noted, no edema  SKIN: no suspicious lesions or rashes  NEURO: Normal strength and tone, mentation intact and speech  normal

## 2024-03-20 ENCOUNTER — DOCUMENTATION ONLY (OUTPATIENT)
Dept: CARDIOLOGY | Facility: CLINIC | Age: 64
End: 2024-03-20
Payer: COMMERCIAL

## 2024-03-20 DIAGNOSIS — R00.2 PALPITATIONS: ICD-10-CM

## 2024-03-20 DIAGNOSIS — E78.00 HYPERCHOLESTEREMIA: ICD-10-CM

## 2024-03-20 DIAGNOSIS — I10 HTN, GOAL BELOW 140/90: Primary | ICD-10-CM

## 2024-03-20 DIAGNOSIS — I25.10 CORONARY ARTERY DISEASE INVOLVING NATIVE CORONARY ARTERY OF NATIVE HEART WITHOUT ANGINA PECTORIS: ICD-10-CM

## 2024-03-20 NOTE — PROGRESS NOTES
Tremayne Galarza has an upcoming lab appointment:    Future Appointments   Date Time Provider Department Center   3/26/2024  7:10 AM LAB FIRST FLOOR [facility] [place]   3/29/2024  2:00 PM [facility]     Patient is scheduled for the following lab(s): Patient is requesting labs. Please order if necessary, thank you.      Pratibha Olivarez

## 2024-04-10 ENCOUNTER — TELEPHONE (OUTPATIENT)
Dept: CARDIOLOGY | Facility: CLINIC | Age: 64
End: 2024-04-10
Payer: COMMERCIAL

## 2024-04-10 DIAGNOSIS — I25.10 CORONARY ARTERY DISEASE INVOLVING NATIVE CORONARY ARTERY OF NATIVE HEART WITHOUT ANGINA PECTORIS: ICD-10-CM

## 2024-04-17 NOTE — TELEPHONE ENCOUNTER
atenolol (TENORMIN) 25 MG   Last Written Prescription Date:  2/21/23  Last Fill Quantity: 90,   # refills: 3  Last Office Visit : 2/21/23  Future Office visit:  NONE  RTC 1 YEAR   Routing message to Card/pool : OVER DUE APPT.     90 DAY REFILL SENT BP < 140/90

## 2024-04-18 ENCOUNTER — TELEPHONE (OUTPATIENT)
Dept: CARDIOLOGY | Facility: CLINIC | Age: 64
End: 2024-04-18
Payer: COMMERCIAL

## 2024-04-18 RX ORDER — ATENOLOL 25 MG/1
25 TABLET ORAL DAILY
Qty: 90 TABLET | Refills: 0 | Status: SHIPPED | OUTPATIENT
Start: 2024-04-18 | End: 2024-07-16

## 2024-04-18 NOTE — TELEPHONE ENCOUNTER
Called patient to schedule 1 year follow up with Marely Mitchell PA-C or MD. Left voice mail directing to patient line, will call again later.     Arlet CHINCHILLA, VF

## 2024-04-22 ENCOUNTER — TELEPHONE (OUTPATIENT)
Dept: CARDIOLOGY | Facility: CLINIC | Age: 64
End: 2024-04-22
Payer: COMMERCIAL

## 2024-04-22 NOTE — TELEPHONE ENCOUNTER
Called patient to schedule 1 year follow up with Marely Mitchell PA-C. Left voice mail directing to patient line, will call again later.     Arlet CHINCHILLA, VF

## 2024-04-30 NOTE — TELEPHONE ENCOUNTER
Attempted to reach patient. Left a message to call back to schedule cardiology follow up. Informed him a mariza prescription was sent of his medication. Appointment needed for further refills.    INDIA Rubio

## 2024-05-22 ENCOUNTER — LAB (OUTPATIENT)
Dept: LAB | Facility: CLINIC | Age: 64
End: 2024-05-22
Payer: COMMERCIAL

## 2024-05-22 DIAGNOSIS — I10 HTN, GOAL BELOW 140/90: ICD-10-CM

## 2024-05-22 DIAGNOSIS — E78.00 HYPERCHOLESTEREMIA: ICD-10-CM

## 2024-05-22 DIAGNOSIS — R00.2 PALPITATIONS: ICD-10-CM

## 2024-05-22 LAB
ANION GAP SERPL CALCULATED.3IONS-SCNC: 10 MMOL/L (ref 7–15)
BASOPHILS # BLD AUTO: 0 10E3/UL (ref 0–0.2)
BASOPHILS NFR BLD AUTO: 1 %
BUN SERPL-MCNC: 24.2 MG/DL (ref 8–23)
CALCIUM SERPL-MCNC: 9.9 MG/DL (ref 8.8–10.2)
CHLORIDE SERPL-SCNC: 105 MMOL/L (ref 98–107)
CHOLEST SERPL-MCNC: 125 MG/DL
CREAT SERPL-MCNC: 1.01 MG/DL (ref 0.67–1.17)
DEPRECATED HCO3 PLAS-SCNC: 24 MMOL/L (ref 22–29)
EGFRCR SERPLBLD CKD-EPI 2021: 84 ML/MIN/1.73M2
EOSINOPHIL # BLD AUTO: 0.2 10E3/UL (ref 0–0.7)
EOSINOPHIL NFR BLD AUTO: 3 %
ERYTHROCYTE [DISTWIDTH] IN BLOOD BY AUTOMATED COUNT: 12.2 % (ref 10–15)
FASTING STATUS PATIENT QL REPORTED: YES
FASTING STATUS PATIENT QL REPORTED: YES
GLUCOSE SERPL-MCNC: 108 MG/DL (ref 70–99)
HCT VFR BLD AUTO: 42.8 % (ref 40–53)
HDLC SERPL-MCNC: 44 MG/DL
HGB BLD-MCNC: 14.7 G/DL (ref 13.3–17.7)
IMM GRANULOCYTES # BLD: 0 10E3/UL
IMM GRANULOCYTES NFR BLD: 0 %
LDLC SERPL CALC-MCNC: 57 MG/DL
LYMPHOCYTES # BLD AUTO: 2.3 10E3/UL (ref 0.8–5.3)
LYMPHOCYTES NFR BLD AUTO: 37 %
MCH RBC QN AUTO: 32.4 PG (ref 26.5–33)
MCHC RBC AUTO-ENTMCNC: 34.3 G/DL (ref 31.5–36.5)
MCV RBC AUTO: 94 FL (ref 78–100)
MONOCYTES # BLD AUTO: 0.5 10E3/UL (ref 0–1.3)
MONOCYTES NFR BLD AUTO: 8 %
NEUTROPHILS # BLD AUTO: 3.1 10E3/UL (ref 1.6–8.3)
NEUTROPHILS NFR BLD AUTO: 51 %
NONHDLC SERPL-MCNC: 81 MG/DL
NRBC # BLD AUTO: 0 10E3/UL
NRBC BLD AUTO-RTO: 0 /100
PLATELET # BLD AUTO: 151 10E3/UL (ref 150–450)
POTASSIUM SERPL-SCNC: 4.8 MMOL/L (ref 3.4–5.3)
RBC # BLD AUTO: 4.54 10E6/UL (ref 4.4–5.9)
SODIUM SERPL-SCNC: 139 MMOL/L (ref 135–145)
TRIGL SERPL-MCNC: 120 MG/DL
TSH SERPL DL<=0.005 MIU/L-ACNC: 2.3 UIU/ML (ref 0.3–4.2)
WBC # BLD AUTO: 6.1 10E3/UL (ref 4–11)

## 2024-05-22 PROCEDURE — 85025 COMPLETE CBC W/AUTO DIFF WBC: CPT

## 2024-05-22 PROCEDURE — 80048 BASIC METABOLIC PNL TOTAL CA: CPT

## 2024-05-22 PROCEDURE — 84443 ASSAY THYROID STIM HORMONE: CPT

## 2024-05-22 PROCEDURE — 80061 LIPID PANEL: CPT

## 2024-05-22 PROCEDURE — 36415 COLL VENOUS BLD VENIPUNCTURE: CPT

## 2024-05-23 ENCOUNTER — DOCUMENTATION ONLY (OUTPATIENT)
Dept: CARDIOLOGY | Facility: CLINIC | Age: 64
End: 2024-05-23

## 2024-05-23 NOTE — PROGRESS NOTES
"Tremayne opted to reschedule today's visit. The following chart review is preserved for a future cardiology visit (which can be done with Dr. Bridges, whom he has seen in the past.)    Cardiac history of note:  Tremayne Galarza is a 64 year old male with [facility], [facility], and HL who presents for follow up of chronic cardiovascular conditions.     He has been followed by Martin West and Yul[facility]. His cardiac history is notable for CAD, which initially presented in 2014 with exertional chest discomfort (\"lung tingling\" sensation during a kickboxing workout at [employer]), prompting stress echocardiogram 8/2014 which was abnormal with lack of LVEF augmentation and anterior/apical hypokinesis with stress. Coronary angiogram was done at [facility] 8/27/2014 which showed significant RCA stenosis for which he underwent PCI+DESx1 to [facility] and DESx2 to [facility]. He subsequently transferred care and was seen by Dr. West who reviewed angiogram images and felt that there may also be a significant stenosis in the LAD. Coronary angiogram was done 2/6/15, which showed long 60% mLAD lesion which was borderline hemodynamically significant (FFR 0.78); RCA stents had <25% in-stent restenosis at that time.     Labs:  Lipids 5/22/24: LDL 57    Testing/Procedures:  Coronary Angiogram (3/26/15):  Coronary angiography was performed via the Rt RADIAL artery using a 5 Fr Shiraz catheter and a 6 Fr Ikari 3.5 GC.  The LAD and LCx had separate ostia.  Both coronary vessels had moderate calcification in the proximal segment.  The LAD had 30-40% narrowing proximally and a long 60% stenosis in the mid segment.  There was 25% narrowing at the takeoff of D3.  There were three diagonal branches, all moderate in caliber.  D1 had minimal disease.  D2 had 50% ostial narrowing.  D3 had minimal disease.  The LCx gave rise to 2 OM branches.  There was mild atherosclerosis (25%) disease in the LCx that was diffuse in nature.  The OM " branches had mild disease. The RCA had a stent in the proximal segment and two overlapping stents spanning the mid-distal segment.  There was mild ISR (10%) in the stented segment.  There was 30-40% disease between the two stents.  There was minimal disease in the Rt PDA and Rt PL segments.     Physiologic assessment was performed on the LAD with the Radiwire positioned at the apex of the LAD.  The FFR of LAD was 0.78 at peak hyperemia.     Impression:   1. Single vessel CAD, angiographically borderline 60-70% long stenosis in mid LAD  2. Physiologically borderline stenosis in mid LAD

## 2024-05-28 DIAGNOSIS — E78.00 HYPERCHOLESTEREMIA: ICD-10-CM

## 2024-05-29 ENCOUNTER — MYC MEDICAL ADVICE (OUTPATIENT)
Dept: CARDIOLOGY | Facility: CLINIC | Age: 64
End: 2024-05-29
Payer: COMMERCIAL

## 2024-05-29 DIAGNOSIS — E78.00 HYPERCHOLESTEREMIA: ICD-10-CM

## 2024-05-29 RX ORDER — ROSUVASTATIN CALCIUM 40 MG/1
40 TABLET, COATED ORAL DAILY
Qty: 90 TABLET | Refills: 3 | Status: SHIPPED | OUTPATIENT
Start: 2024-05-29

## 2024-05-29 RX ORDER — EZETIMIBE 10 MG/1
10 TABLET ORAL DAILY
Qty: 90 TABLET | Refills: 3 | Status: SHIPPED | OUTPATIENT
Start: 2024-05-29

## 2024-06-25 ENCOUNTER — TELEPHONE (OUTPATIENT)
Dept: CARDIOLOGY | Facility: CLINIC | Age: 64
End: 2024-06-25
Payer: COMMERCIAL

## 2024-06-25 NOTE — TELEPHONE ENCOUNTER
6/25/2024 4:55PM Navya Khanh  Patient confirmed scheduled appointment:  Date: 6/26/2024  Time: 1:15PM  Visit type: Return Cardiology (Video Visit)  Provider: Dr. Bridges  Location: Video Visit; [facility], [address], [place] [address]  Testing/imaging: Pt had labs 5/22  Additional notes: 6/25 Scheduled pt off waitlist for Return Cardiology vv w/ Dr. Bridges 6/26 at 1:15PM. Pt confirmed will be in [place]. Pt had labs 5/22. Pt would like to leave 12/18 appt as scheduled w/ Dr. Bridges and will cancel/reschedule after visit tomorrow as needed. KAI Cassidy 6/25/2024 4:55PM

## 2024-06-26 ENCOUNTER — TELEPHONE (OUTPATIENT)
Dept: CARDIOLOGY | Facility: CLINIC | Age: 64
End: 2024-06-26

## 2024-06-26 NOTE — TELEPHONE ENCOUNTER
Patient needs to be rescheduled for their virtual visit due to Reason for Reschedule: No-Show    Appointment mode: Video  Provider: Dr. Bridges

## 2024-07-07 ENCOUNTER — HEALTH MAINTENANCE LETTER (OUTPATIENT)
Age: 64
End: 2024-07-07

## 2024-07-15 DIAGNOSIS — I25.10 CORONARY ARTERY DISEASE INVOLVING NATIVE CORONARY ARTERY OF NATIVE HEART WITHOUT ANGINA PECTORIS: ICD-10-CM

## 2024-07-15 RX ORDER — ATENOLOL 25 MG/1
25 TABLET ORAL DAILY
Qty: 90 TABLET | Refills: 0 | Status: CANCELLED | OUTPATIENT
Start: 2024-07-15

## 2024-07-16 ENCOUNTER — TRANSFERRED RECORDS (OUTPATIENT)
Dept: HEALTH INFORMATION MANAGEMENT | Facility: CLINIC | Age: 64
End: 2024-07-16
Payer: COMMERCIAL

## 2024-07-16 ENCOUNTER — MYC REFILL (OUTPATIENT)
Dept: CARDIOLOGY | Facility: CLINIC | Age: 64
End: 2024-07-16
Payer: COMMERCIAL

## 2024-07-16 DIAGNOSIS — I25.10 CORONARY ARTERY DISEASE INVOLVING NATIVE CORONARY ARTERY OF NATIVE HEART WITHOUT ANGINA PECTORIS: ICD-10-CM

## 2024-07-16 RX ORDER — ATENOLOL 25 MG/1
25 TABLET ORAL DAILY
Qty: 90 TABLET | Refills: 1 | Status: SHIPPED | OUTPATIENT
Start: 2024-07-16

## 2024-07-16 NOTE — TELEPHONE ENCOUNTER
Refill sent until next follow-up with Dr Bridges/December 2024.         Requested Prescriptions   Pending Prescriptions Disp Refills    atenolol (TENORMIN) 25 MG tablet 90 tablet 0     Sig: Take 1 tablet (25 mg) by mouth daily       Beta-Blockers Protocol Failed - 7/16/2024  8:10 AM        Failed - Blood pressure under 140/90 in past 12 months     BP Readings from Last 3 Encounters:   12/23/23 (!) 153/89   06/02/21 122/75   05/12/21 136/79       No data recorded            Failed - Recent (12 mo) or future (90 days) visit within the authorizing provider's specialty     The patient must have completed an in-person or virtual visit within the past 12 months or has a future visit scheduled within the next 90 days with the authorizing provider s specialty.  Urgent care and e-visits do not quality as an office visit for this protocol.          Passed - Patient is age 6 or older        Passed - Medication is active on med list        Passed - Medication indicated for associated diagnosis     Medication is associated with one or more of the following diagnoses:     Hypertension (HTN)   Atrial fibrillation/flutter   Angina   ASCVD   Migraine   Heart Failure   Tremor   Anxiety   Ocular hypertension   Glaucoma   PTSD

## 2024-07-17 ENCOUNTER — TELEPHONE (OUTPATIENT)
Dept: CARDIOLOGY | Facility: CLINIC | Age: 64
End: 2024-07-17
Payer: COMMERCIAL

## 2024-07-24 ENCOUNTER — TRANSFERRED RECORDS (OUTPATIENT)
Dept: HEALTH INFORMATION MANAGEMENT | Facility: CLINIC | Age: 64
End: 2024-07-24
Payer: COMMERCIAL

## 2024-08-13 ENCOUNTER — TELEPHONE (OUTPATIENT)
Dept: CARDIOLOGY | Facility: CLINIC | Age: 64
End: 2024-08-13
Payer: COMMERCIAL

## 2024-08-13 NOTE — TELEPHONE ENCOUNTER
Called to offer patient last minute opening w/ Marely Mitchell PA-C at 3:30 PM today, 8/13/24. LVM.

## 2024-08-14 ENCOUNTER — TRANSFERRED RECORDS (OUTPATIENT)
Dept: HEALTH INFORMATION MANAGEMENT | Facility: CLINIC | Age: 64
End: 2024-08-14
Payer: COMMERCIAL

## 2024-12-03 NOTE — PROGRESS NOTES
Service Date: 2024    Mee Park, NP  [address] [place] [address]     RE:  Tremayne Galarza   MRN:  [id number]   :  1960       Dear Ms. Park    It was a pleasure participating in the care of your patient, Tremayne Galarza .  As you know, he is a 64 year old year old person who I met in person today for coronary artery disease, hypertension and hyperlipidemia .    Past medical history is significant for the followin.  Hypertension.  2.  Hyperlipidemia.  3.  Skin cancer.  4.  Perianal abscess.  5.  Anal fistula.  6.  Alcohol abuse.  7.  Obesity.  8.  Allergic rhinitis.     His cardiac history is significant for known coronary disease and normal LV systolic function.     In , he experienced tingling in his lungs during a kickboxing workout at [employer].  He had a positive stress echo 2014.  There was lack of augmentation of ejection fraction during peak exercise and anteroapical hypokinesis was suggested.     Coronary angiogram at [facility] was performed 2014 and drug-eluting stent was placed to the proximal right coronary artery along with two other drug-eluting stents in the mid right coronary artery.     He saw Dr. West after he switched care and coronary angiography performed by Dr. West on 2015 revealed the following:     Left main:  Short with separate ostia of the LAD and circumflex.  Left anterior descending:  Long 60% stenosis in the mid portion with an FFR of 0.78.  D2:  50% ostial stenosis.  Circumflex:  Mild diffuse disease.  RCA:  Minimal in-stent restenosis with a 30% to 40% proximal lesion.     Medical therapy was recommended and the borderline LAD lesion was medically treated, but Dr. West recommended that if angina returned then JOSE of the mid LAD could be considered to relieve symptoms.    I last saw the patient 2023, and at that time we continued his present medications, and plan for virtual visit in 1 year with  "labs.  He presents today for continuing care.    From a clinical standpoint since our last visit, he has done well.  Participates in high intensity workouts 4 times a week doing kickboxing for an hour each time.  He has not experienced any angina or chest discomfort whatsoever.  He also takes walks with his wife for about 5 miles twice a week without limiting symptomatology.  He does not keep track of his blood pressures at home.    The patient otherwise denies gross chest pain, shortness of breath, PND, orthopnea, edema, palpitations, syncope or near syncope.    10 Point Review of Systems: Positive for hypersomnolence.  His [place] Sleepiness Scale score today is 20    MEDICATIONS:    1.  Atenolol 25 mg a day.  2.  Zetia 10 mg a day.  3.  Rosuvastatin 40 mg a day.  4.  Viagra 50 mg a day.    PHYSICAL EXAM:    Vitals: He does not take his blood pressures at home  General:  Patient appears comfortable, well groomed  Psych:  Patient is alert and oriented X 3  Eyes:  No gross erythema, exudate  Neck:  JVP: Normal  Pulm:  CTA  CV: Rate rhythm no obvious murmur or gallop  Abdomen:  soft, non tender, positive bowel sounds  Lower extremities: No edema      LABS:    5/22/2024: LDL 57, potassium 4.8, GFR normal, TSH normal, hemoglobin normal    IMPRESSION:    Tremayne is a 64-year-old gentleman who has several active cardiac issues:     1.  History of coronary artery disease.     He had 3 stents placed in the right coronary artery in 2014 for symptoms of \"tingling in his lungs.\"   A positive stress test noted lack of augmentation of LV systolic function.     After revascularization, he had no further symptoms and is currently exertionally unlimited.     Most recent coronary angiogram in 2015, by Dr. West detected minimal in-stent restenosis; however, he did have a borderline stenosis in the mid LAD with an FFR of 0.78.  He also has a 50% ostial stenosis of the second diagonal branch that was medically treated.     Dr. West " mentioned that if the patient had recurrent symptoms that consideration of revascularization of the mid LAD could be contemplated, but only if needed.      From a clinical standpoint, he is asymptomatic at a high level of exertion.  Continue to follow clinically.    2.  Hypertension, well controlled.      He does not take his blood pressures at home, he will track them and gather further information     3.  Hyperlipidemia, well controlled.      LDL is currently 57 as of 5/22/2024.  Within goal of less than 70 on rosuvastatin 40 and ezetimibe 10 mg continue to follow.    PLAN:    1.  The patient will follow-up via phone or MyChart in 2 weeks after recording home blood pressure and pulse readings 2 to 3 hours after morning medications, and after adding resting quietly for 10 to 15 minutes.    2.  Sleep study in order to rule out sleep-related disorder as he is quite hypersomnolent and does snore.    3.  Follow-up visit 1 year with labs prior, earlier if needed.    Once again, it was a pleasure participating in the care of your patient, Tremayne Galarza .  Please feel free to contact me at any time if there are any questions regarding his care in the future.      Sincerely,          Daniel Bridges M.D.  Cardiovascular Division  [facility]      Total time:  Including pre-visit chart review, in person face to face visit, post visit charting time as well as time for coordination of care:  35min

## 2024-12-18 ENCOUNTER — OFFICE VISIT (OUTPATIENT)
Dept: CARDIOLOGY | Facility: CLINIC | Age: 64
End: 2024-12-18
Payer: COMMERCIAL

## 2024-12-18 VITALS
DIASTOLIC BLOOD PRESSURE: 87 MMHG | HEIGHT: 72 IN | BODY MASS INDEX: 30.37 KG/M2 | HEART RATE: 60 BPM | SYSTOLIC BLOOD PRESSURE: 165 MMHG | OXYGEN SATURATION: 97 % | WEIGHT: 224.2 LBS

## 2024-12-18 DIAGNOSIS — G47.10 HYPERSOMNOLENCE: ICD-10-CM

## 2024-12-18 DIAGNOSIS — I25.10 CORONARY ARTERY DISEASE INVOLVING NATIVE CORONARY ARTERY OF NATIVE HEART WITHOUT ANGINA PECTORIS: ICD-10-CM

## 2024-12-18 DIAGNOSIS — E78.5 HYPERLIPIDEMIA LDL GOAL <100: ICD-10-CM

## 2024-12-18 ASSESSMENT — PAIN SCALES - GENERAL: PAINLEVEL_OUTOF10: NO PAIN (0)

## 2024-12-18 NOTE — NURSING NOTE
Chief Complaint   Patient presents with    Follow Up     Return General Cardiology for annual       Initial BP (!) 165/87 (BP Location: Right arm, Patient Position: Sitting, Cuff Size: Adult Large)   Pulse 60   Ht 1.829 m (6')   Wt 101.7 kg (224 lb 3.2 oz)   SpO2 97%   BMI 30.41 kg/m   Estimated body mass index is 30.41 kg/m  as calculated from the following:    Height as of this encounter: 1.829 m (6').    Weight as of this encounter: 101.7 kg (224 lb 3.2 oz).  BP completed using cuff size: Large    Medication refills needed?: No      Kelley Casanova, EMT

## 2024-12-18 NOTE — NURSING NOTE
Med Reconcile: Reviewed and verified all current medications with the patient. The updated medication list was printed and given to the patient./ No medication changes. BP update in 2 weeks.       Return Appointment  -Follow-up in 1 year with labs prior   -Referral for sleep study for hypersomnolence.

## 2024-12-18 NOTE — PATIENT INSTRUCTIONS
Take your medicines every day, as directed     Changes made today:  No medication changes   Referral for sleep study   Send blood pressure update via [employer] in 2 weeks        Cardiology Care Coordinators:      Opal RAMIREZ RN     Cardiology Rooming staff:  Kelley CASANOVA    Phone  [phone]      Fax [phone]    To Contact us     During Business Hours:  [phone]     If you are needing refills please contact your pharmacy.     For urgent after hour care please call the [facility] at [phone] or the [facility] at [phone] and ask to speak to the cardiologist on call.            HOW TO CHECK YOUR BLOOD PRESSURE AT HOME:     Avoid eating, smoking, and exercising for at least 30 minutes before taking a reading.     Be sure you have taken your BP medication at least 2-3 hours before you check it.      Sit quietly for 10 minutes before a reading.      Sit in a chair with your feet flat on the floor. Rest your  arm on a table so that the arm cuff is at the same level as your heart.     Remain still during the reading.  Record your blood pressure and pulse in a log and bring to your next appointment.       Use [employer] allows you to communicate directly with your heart team through secure messaging.  [employer] can be accessed any time on your phone, computer, or tablet.  If you need assistance, we'd be happy to help!             Keep your Heart Appointments:     Follow-up in 1 year with fasting labs prior

## 2025-01-08 DIAGNOSIS — I25.10 CORONARY ARTERY DISEASE INVOLVING NATIVE CORONARY ARTERY OF NATIVE HEART WITHOUT ANGINA PECTORIS: ICD-10-CM

## 2025-01-13 RX ORDER — ATENOLOL 25 MG/1
25 TABLET ORAL DAILY
Qty: 90 TABLET | Refills: 0 | Status: SHIPPED | OUTPATIENT
Start: 2025-01-13

## 2025-01-14 NOTE — TELEPHONE ENCOUNTER
Patient has been notified via [employer] to send BP update as discussed in clinic. Awaiting response.     Requested Prescriptions   Signed Prescriptions Disp Refills    atenolol (TENORMIN) 25 MG tablet 90 tablet 0     Sig: Take 1 tablet (25 mg) by mouth daily.       Beta-Blockers Protocol Failed - 1/14/2025 10:52 AM        Failed - Most recent blood pressure under 140/90 in past 12 months     BP Readings from Last 3 Encounters:   12/18/24 (!) 165/87   12/23/23 (!) 153/89   06/02/21 122/75       No data recorded            Passed - Patient is age 6 or older        Passed - Medication is active on med list        Passed - Medication indicated for associated diagnosis     Medication is associated with one or more of the following diagnoses:     Hypertension (HTN)   Atrial fibrillation/flutter   Angina   ASCVD   Migraine   Heart Failure   Tremor   Anxiety   Ocular hypertension   Glaucoma   PTSD   Obstructive hypertrophic cardiomyopathy   History of myocarditis   Palpitations   POTS (postural orthostatic tachycardia syndrome)   SVT (supraventricular tachycardia)   Hyperthyroidism   Tachycardia   Acute non-st segment elevation myocardial infarction   Subsequent non-st segment elevation myocardial infarction   Ischemic myocardial dysfunction          Passed - Recent (12 mo) or future (90 days) visit within the authorizing provider's specialty     The patient must have completed an in-person or virtual visit within the past 12 months or has a future visit scheduled within the next 90 days with the authorizing provider s specialty.  Urgent care and e-visits do not qualify as an office visit for this protocol.

## 2025-02-12 SDOH — HEALTH STABILITY: PHYSICAL HEALTH: ON AVERAGE, HOW MANY DAYS PER WEEK DO YOU ENGAGE IN MODERATE TO STRENUOUS EXERCISE (LIKE A BRISK WALK)?: 5 DAYS

## 2025-02-12 SDOH — HEALTH STABILITY: PHYSICAL HEALTH: ON AVERAGE, HOW MANY MINUTES DO YOU ENGAGE IN EXERCISE AT THIS LEVEL?: 50 MIN

## 2025-02-12 ASSESSMENT — SOCIAL DETERMINANTS OF HEALTH (SDOH): HOW OFTEN DO YOU GET TOGETHER WITH FRIENDS OR RELATIVES?: ONCE A WEEK

## 2025-02-13 ENCOUNTER — OFFICE VISIT (OUTPATIENT)
Dept: FAMILY MEDICINE | Facility: CLINIC | Age: 65
End: 2025-02-13
Payer: COMMERCIAL

## 2025-02-13 VITALS
BODY MASS INDEX: 31.1 KG/M2 | DIASTOLIC BLOOD PRESSURE: 82 MMHG | SYSTOLIC BLOOD PRESSURE: 134 MMHG | WEIGHT: 222.13 LBS | HEIGHT: 71 IN | HEART RATE: 59 BPM | OXYGEN SATURATION: 96 % | RESPIRATION RATE: 18 BRPM | TEMPERATURE: 97.6 F

## 2025-02-13 DIAGNOSIS — Z23 ENCOUNTER FOR IMMUNIZATION: ICD-10-CM

## 2025-02-13 DIAGNOSIS — I25.10 CORONARY ARTERY DISEASE INVOLVING NATIVE CORONARY ARTERY OF NATIVE HEART WITHOUT ANGINA PECTORIS: ICD-10-CM

## 2025-02-13 DIAGNOSIS — R06.83 LOUD SNORING: ICD-10-CM

## 2025-02-13 DIAGNOSIS — E66.09 CLASS 1 OBESITY DUE TO EXCESS CALORIES WITH SERIOUS COMORBIDITY AND BODY MASS INDEX (BMI) OF 31.0 TO 31.9 IN ADULT: ICD-10-CM

## 2025-02-13 DIAGNOSIS — I10 HTN, GOAL BELOW 140/90: ICD-10-CM

## 2025-02-13 DIAGNOSIS — Z00.00 ANNUAL PHYSICAL EXAM: Primary | ICD-10-CM

## 2025-02-13 DIAGNOSIS — E66.811 CLASS 1 OBESITY DUE TO EXCESS CALORIES WITH SERIOUS COMORBIDITY AND BODY MASS INDEX (BMI) OF 31.0 TO 31.9 IN ADULT: ICD-10-CM

## 2025-02-13 DIAGNOSIS — E78.5 HYPERLIPIDEMIA WITH TARGET LDL LESS THAN 100: ICD-10-CM

## 2025-02-13 PROBLEM — R10.12 LUQ ABDOMINAL PAIN: Status: RESOLVED | Noted: 2018-03-27 | Resolved: 2025-02-13

## 2025-02-13 PROBLEM — B95.8 STAPH INFECTION: Status: RESOLVED | Noted: 2020-03-03 | Resolved: 2025-02-13

## 2025-02-13 RX ORDER — ASPIRIN 81 MG/1
81 TABLET ORAL DAILY
Qty: 30 TABLET | Refills: 3 | Status: SHIPPED | OUTPATIENT
Start: 2025-02-13

## 2025-02-13 ASSESSMENT — PAIN SCALES - GENERAL: PAINLEVEL_OUTOF10: NO PAIN (0)

## 2025-02-13 NOTE — NURSING NOTE
Prior to immunization administration, verified patients identity using patient s name and date of birth. Please see Immunization Activity for additional information.     Screening Questionnaire for Adult Immunization    Are you sick today?   No   Do you have allergies to medications, food, a vaccine component or latex?   No   Have you ever had a serious reaction after receiving a vaccination?   No   Do you have a long-term health problem with heart, lung, kidney, or metabolic disease (e.g., diabetes), asthma, a blood disorder, no spleen, complement component deficiency, a cochlear implant, or a spinal fluid leak?  Are you on long-term aspirin therapy?   Yes   Do you have cancer, leukemia, HIV/AIDS, or any other immune system problem?   No   Do you have a parent, brother, or sister with an immune system problem?   No   In the past 3 months, have you taken medications that affect  your immune system, such as prednisone, other steroids, or anticancer drugs; drugs for the treatment of rheumatoid arthritis, Crohn s disease, or psoriasis; or have you had radiation treatments?   No   Have you had a seizure, or a brain or other nervous system problem?   No   During the past year, have you received a transfusion of blood or blood    products, or been given immune (gamma) globulin or antiviral drug?   No   For women: Are you pregnant or is there a chance you could become       pregnant during the next month?   No   Have you received any vaccinations in the past 4 weeks?   No     Immunization questionnaire was positive for at least one answer.  Notified .      Patient instructed to remain in clinic for 15 minutes afterwards, and to report any adverse reactions.     Screening performed by Sirisha Cancino MA on 2/13/2025 at 1:30 PM.

## 2025-02-13 NOTE — PROGRESS NOTES
"Preventive Care Visit  [facility]  Valerio Shepard MD, Internal Medicine  Feb 13, 2025      Assessment & Plan     1.  Annual physical exam completed and overall is good.  2.  Coronary artery disease, currently asymptomatic.  Status post PCI+JOSE to proximal RCA and PCI+DESX2 to mid RCA on 8/27/2014.  Subsequent angiogram 2/6/2015 revealing long 60% mid LAD lesion not flow-limiting.  Currently on beta-blocker and statin therapy.  3.  Essential hypertension with blood pressure at goal.  4.  Hyperlipidemia adequately treated with combination of rosuvastatin 40 mg and Zetia 10 mg daily.  Cholesterol 125 HDL 44 LDL 57 triglyceride 120 measured on 5/22/2024.  The goal would be to get LDL below 55.  He plans to have it rechecked during his cardiology appointment in May.  5.  Class I obesity with a BMI of 31.  He is already exercising regularly.  Needs to work on his diet site to lose some weight.  6.  Encounter for immunization.  First dose of zoster vaccine provided today.  Advised to return in 2 to 3 months for second dose of Shingrix.  Pneumovax 20 vaccine provided today.  In the future he should consider RSV vaccine.  7.  Loud snoring with daytime hypersomnia.  Patient awaiting sleep medicine evaluation.      Patient has been advised of split billing requirements and indicates understanding: Yes    BMI  Estimated body mass index is 31.42 kg/m  as calculated from the following:    Height as of this encounter: 1.791 m (5' 10.5\").    Weight as of this encounter: 100.8 kg (222 lb 2 oz).   Weight management plan: Discussed healthy diet and exercise guidelines    Counseling  Appropriate preventive services were addressed with this patient via screening, questionnaire, or discussion as appropriate for fall prevention, nutrition, physical activity, Tobacco-use cessation, social engagement, weight loss and cognition.  Checklist reviewing preventive services available has been given to the " patient.  Reviewed patient's diet, addressing concerns and/or questions.         Isatu Casper is a 64 year old, presenting for the following:  Physical and Imm/Inj (Shingrix )        2/13/2025    12:47 PM   Additional Questions   Roomed by Tiara REYNOSO   Accompanied by self          Healthy Habits:     Getting at least 3 servings of Calcium per day:  Yes    Bi-annual eye exam:  Yes    Dental care twice a year:  Yes    Sleep apnea or symptoms of sleep apnea:  None    Diet:  Regular (no restrictions)    Frequency of exercise:  4-5 days/week    Duration of exercise:  45-60 minutes    Taking medications regularly:  Yes    Barriers to taking medications:  None    Medication side effects:  None    Additional concerns today:  No    64-year-old gentleman with history of coronary artery disease, hypertension, hyperlipidemia, class I obesity has come in for annual physical examination.  He is closely followed by cardiology.  He is currently doing well and has no symptoms.  He does indicate that he snores loudly and does have hypersomnia during the daytime.  He is already set up for sleep evaluation.  He takes his medication as recommended and his lab work performed in May 2024 was good.  Cholesterol was at target.  He plans to have his labs repeated again this May.    He works for [employer].  He does not smoke.  Drinks an alcoholic beverage once or twice a week at most.  Exercises at least 5 days a week.      Health Care Directive  Patient does not have a Health Care Directive: Discussed advance care planning with patient; information given to patient to review.      2/12/2025   General Health   How would you rate your overall physical health? Good   Feel stress (tense, anxious, or unable to sleep) Only a little   (!) STRESS CONCERN      2/12/2025   Nutrition   Three or more servings of calcium each day? Yes   Diet: Regular (no restrictions)   How many servings of fruit and vegetables per day? (!) 0-1   How  many sweetened beverages each day? 0-1         2/12/2025   Exercise   Days per week of moderate/strenous exercise 5 days   Average minutes spent exercising at this level 50 min         2/12/2025   Social Factors   Frequency of gathering with friends or relatives Once a week   Worry food won't last until get money to buy more No   Food not last or not have enough money for food? No   Do you have housing? (Housing is defined as stable permanent housing and does not include staying ouside in a car, in a tent, in an abandoned building, in an overnight shelter, or couch-surfing.) Yes   Are you worried about losing your housing? No   Lack of transportation? No   Unable to get utilities (heat,electricity)? No         2/12/2025   Fall Risk   Fallen 2 or more times in the past year? No   Trouble with walking or balance? No          2/12/2025   Dental   Dentist two times every year? Yes            Today's PHQ-2 Score:       2/12/2025     3:23 PM   PHQ-2 ( 1999 [employer])   Q1: Little interest or pleasure in doing things 0   Q2: Feeling down, depressed or hopeless 0   PHQ-2 Score 0    Q1: Little interest or pleasure in doing things Not at all   Q2: Feeling down, depressed or hopeless Not at all   PHQ-2 Score 0       Patient-reported           2/12/2025   Substance Use   Alcohol more than 3/day or more than 7/wk No   Do you use any other substances recreationally? (!) CANNABIS PRODUCTS     Social History     Tobacco Use    Smoking status: Never     Passive exposure: Never    Smokeless tobacco: Never   Substance Use Topics    Alcohol use: Yes     Comment: 2-3 drinks weekly    Drug use: No           2/12/2025   STI Screening   New sexual partner(s) since last STI/HIV test? No   Last PSA:   PSA   Date Value Ref Range Status   06/05/2013 0.5 ng/mL Final     ASCVD Risk   The ASCVD Risk score (Lor MCKENZIE, et al., 2019) failed to calculate for the following reasons:    The valid total cholesterol range is 130 to 320  mg/dL    Fracture Risk Assessment Tool  Link to Frax Calculator  Use the information below to complete the Frax calculator  : 1960  Sex: male  Weight (kg): 100.8 kg (actual weight)  Height (cm): 179.1 cm  Previous Fragility Fracture:  No  History of parent with fractured hip:  No  Current Smoking:  No  Patient has been on glucocorticoids for more than 3 months (5mg/day or more): No  Rheumatoid Arthritis on Problem List:  No  Secondary Osteoporosis on Problem List:  No  Consumes 3 or more units of alcohol per day: No  Femoral Neck BMD (g/cm2)           Reviewed and updated as needed this visit by Provider                    Past Medical History:   Diagnosis Date    Chronic ischemic heart disease 2014    Overview:  3 stents to RCA 2014     Essential hypertension 2005    Overview:  LW Onset:  [id number]     Fistula     Hyperlipidemia 2005    Overview:  LW Onset:       Perirectal abscess     Staph infection      Past Surgical History:   Procedure Laterality Date    BIOPSY      CARDIAC SURGERY      COLONOSCOPY N/A 2015    Procedure: COLONOSCOPY;  Surgeon: Duane, William Charles, MD;  Location: [facility] OR    COLONOSCOPY WITH CO2 INSUFFLATION N/A 2015    Procedure: COLONOSCOPY WITH CO2 INSUFFLATION;  Surgeon: Duane, William Charles, MD;  Location: [facility] OR    EXAM UNDER ANESTHESIA ANUS N/A 1/10/2020    Procedure: EXAM UNDER ANESTHESIA, ANUS, inscision and drainage of perirectal abscess, partial fistulotomy,  seton placement;  Surgeon: Camila Calderon MD;  Location: [facility] OR    EXAM UNDER ANESTHESIA ANUS N/A 2020    Procedure: Exam Under Anesthesia, Fistulotomy, Seton placement;  Surgeon: Camila Calderon MD;  [facility]    EXAM UNDER ANESTHESIA ANUS N/A 1/15/2021    Procedure: EXAM UNDER ANESTHESIA, ANUS, Completion fistulotomy;  Surgeon: Camila Calderon MD;  [facility]    FISTULOTOMY RECTUM N/A 1/10/2020    Procedure: partial FISTULOTOMY,  RECTUM;  Surgeon: Camila Calderon MD;  [facility]    PLACEMENT OF SETON RECTUM N/A 1/10/2020    Procedure: PLACEMENT, SETON STITCH;  Surgeon: Camila Calderon MD;  [facility]: [facility]     BP Readings from Last 3 Encounters:   02/13/25 134/82   12/18/24 (!) 165/87   12/23/23 (!) 153/89    Wt Readings from Last 3 Encounters:   02/13/25 100.8 kg (222 lb 2 oz)   12/18/24 101.7 kg (224 lb 3.2 oz)   12/23/23 105.2 kg (231 lb 14.4 oz)                  Patient Active Problem List   Diagnosis    Chronic ischemic heart disease    Benign neoplasm of colon    Hyperlipidemia with target LDL less than 100    HTN, goal below 140/90    Lesion of plantar nerve    Obesity    Allergic rhinitis    Nondependent alcohol abuse    CAD (coronary artery disease)    Verruca plantaris    Perianal abscess    Anal fistula    History of nonmelanoma skin cancer    History of dysplastic nevus     Past Surgical History:   Procedure Laterality Date    BIOPSY      CARDIAC SURGERY      COLONOSCOPY N/A 12/30/2015    Procedure: COLONOSCOPY;  Surgeon: Duane, William Charles, MD;  Location: [facility] OR    COLONOSCOPY WITH CO2 INSUFFLATION N/A 12/30/2015    Procedure: COLONOSCOPY WITH CO2 INSUFFLATION;  Surgeon: Duane, William Charles, MD;  Location: [facility]    EXAM UNDER ANESTHESIA ANUS N/A 1/10/2020    Procedure: EXAM UNDER ANESTHESIA, ANUS, inscision and drainage of perirectal abscess, partial fistulotomy,  seton placement;  Surgeon: Camila Calderon MD;  [facility]    EXAM UNDER ANESTHESIA ANUS N/A 6/19/2020    Procedure: Exam Under Anesthesia, Fistulotomy, Seton placement;  Surgeon: Camila Calderon MD;  [facility]    EXAM UNDER ANESTHESIA ANUS N/A 1/15/2021    Procedure: EXAM UNDER ANESTHESIA, ANUS, Completion fistulotomy;  Surgeon: Camila Calderon MD;  Location: [facility]    FISTULOTOMY RECTUM N/A 1/10/2020    Procedure: partial FISTULOTOMY, RECTUM;  Surgeon: Camila Calderon MD;   "[facility]    PLACEMENT OF SETON RECTUM N/A 1/10/2020    Procedure: PLACEMENT, SETON STITCH;  Surgeon: Camila Calderon MD;  [facility]       Social History     Tobacco Use    Smoking status: Never     Passive exposure: Never    Smokeless tobacco: Never   Substance Use Topics    Alcohol use: Yes     Alcohol/week: 1.0 standard drink of alcohol     Types: 1 Standard drinks or equivalent per week     Comment: 1     Family History   Problem Relation Age of Onset    Coronary Artery Disease Mother     Hypertension Mother     Cerebrovascular Disease Mother 38        stroke  at age 38    Lung Cancer Father 81         Current Outpatient Medications   Medication Sig Dispense Refill    aspirin (ASPIRIN LOW DOSE) 81 MG EC tablet Take 1 tablet (81 mg) by mouth daily. 30 tablet 3    atenolol (TENORMIN) 25 MG tablet Take 1 tablet (25 mg) by mouth daily. 90 tablet 0    benzonatate (TESSALON) 200 MG capsule Take 1 capsule (200 mg) by mouth 3 times daily as needed for cough 30 capsule 0    ezetimibe (ZETIA) 10 MG tablet Take 1 tablet (10 mg) by mouth daily 90 tablet 3    guaiFENesin (MUCINEX PO)       rosuvastatin (CRESTOR) 40 MG tablet Take 1 tablet (40 mg) by mouth daily 90 tablet 3    sildenafil (VIAGRA) 25 MG tablet Take 1 tablet (25 mg) by mouth as needed (for ED) 90 tablet 0     No Known Allergies      Review of Systems  Constitutional, HEENT, cardiovascular, pulmonary, GI, , musculoskeletal, neuro, skin, endocrine and psych systems are negative, except as otherwise noted.     Objective    Exam  /82 (BP Location: Right arm, Patient Position: Right side, Cuff Size: Adult Large)   Pulse 59   Temp 97.6  F (36.4  C) (Tympanic)   Resp 18   Ht 1.791 m (5' 10.5\")   Wt 100.8 kg (222 lb 2 oz)   SpO2 96%   BMI 31.42 kg/m     Estimated body mass index is 31.42 kg/m  as calculated from the following:    Height as of this encounter: 1.791 m (5' 10.5\").    Weight as of this encounter: 100.8 kg (222 lb 2 " oz).    Physical Exam  GENERAL: alert and no distress  EYES: Eyes grossly normal to inspection, PERRL and conjunctivae and sclerae normal  HENT: ear canals and TM's normal, nose and mouth without ulcers or lesions  NECK: no adenopathy, no asymmetry, masses, or scars  RESP: lungs clear to auscultation - no rales, rhonchi or wheezes  CV: regular rate and rhythm, normal S1 S2, no S3 or S4, no murmur, click or rub, no peripheral edema  ABDOMEN: soft, nontender, no hepatosplenomegaly, no masses and bowel sounds normal   (male): normal male genitalia without lesions or urethral discharge, no hernia  RECTAL: normal sphincter tone, no rectal masses, prostate normal size, smooth, nontender without nodules or masses  MS: no gross musculoskeletal defects noted, no edema  SKIN: no suspicious lesions or rashes  NEURO: Normal strength and tone, mentation intact and speech normal  PSYCH: mentation appears normal, affect normal/bright  LYMPH: no cervical, supraclavicular, axillary, or inguinal adenopathy        Signed Electronically by: Valerio Shepard MD

## 2025-03-31 ENCOUNTER — VIRTUAL VISIT (OUTPATIENT)
Dept: SLEEP MEDICINE | Facility: CLINIC | Age: 65
End: 2025-03-31
Attending: INTERNAL MEDICINE
Payer: COMMERCIAL

## 2025-03-31 VITALS — BODY MASS INDEX: 31.78 KG/M2 | HEIGHT: 70 IN | WEIGHT: 222 LBS

## 2025-03-31 DIAGNOSIS — I25.9 CHRONIC ISCHEMIC HEART DISEASE: ICD-10-CM

## 2025-03-31 DIAGNOSIS — E66.09 CLASS 1 OBESITY DUE TO EXCESS CALORIES WITH SERIOUS COMORBIDITY AND BODY MASS INDEX (BMI) OF 31.0 TO 31.9 IN ADULT: ICD-10-CM

## 2025-03-31 DIAGNOSIS — I10 HTN, GOAL BELOW 140/90: ICD-10-CM

## 2025-03-31 DIAGNOSIS — E66.811 CLASS 1 OBESITY DUE TO EXCESS CALORIES WITH SERIOUS COMORBIDITY AND BODY MASS INDEX (BMI) OF 31.0 TO 31.9 IN ADULT: ICD-10-CM

## 2025-03-31 DIAGNOSIS — I25.10 CORONARY ARTERY DISEASE INVOLVING NATIVE CORONARY ARTERY OF NATIVE HEART WITHOUT ANGINA PECTORIS: ICD-10-CM

## 2025-03-31 DIAGNOSIS — R06.83 SNORING: ICD-10-CM

## 2025-03-31 DIAGNOSIS — G47.10 HYPERSOMNOLENCE: Primary | ICD-10-CM

## 2025-03-31 DIAGNOSIS — R06.81 WITNESSED APNEIC SPELLS: ICD-10-CM

## 2025-03-31 PROCEDURE — 1126F AMNT PAIN NOTED NONE PRSNT: CPT | Mod: 95 | Performed by: PHYSICIAN ASSISTANT

## 2025-03-31 PROCEDURE — 98002 SYNCH AUDIO-VIDEO NEW MOD 45: CPT | Performed by: PHYSICIAN ASSISTANT

## 2025-03-31 ASSESSMENT — SLEEP AND FATIGUE QUESTIONNAIRES
HOW LIKELY ARE YOU TO NOD OFF OR FALL ASLEEP WHILE SITTING AND TALKING TO SOMEONE: MODERATE CHANCE OF DOZING
HOW LIKELY ARE YOU TO NOD OFF OR FALL ASLEEP WHILE SITTING AND READING: HIGH CHANCE OF DOZING
HOW LIKELY ARE YOU TO NOD OFF OR FALL ASLEEP WHILE SITTING QUIETLY AFTER LUNCH WITHOUT ALCOHOL: MODERATE CHANCE OF DOZING
HOW LIKELY ARE YOU TO NOD OFF OR FALL ASLEEP WHILE LYING DOWN TO REST IN THE AFTERNOON WHEN CIRCUMSTANCES PERMIT: MODERATE CHANCE OF DOZING
HOW LIKELY ARE YOU TO NOD OFF OR FALL ASLEEP WHILE WATCHING TV: HIGH CHANCE OF DOZING
HOW LIKELY ARE YOU TO NOD OFF OR FALL ASLEEP WHEN YOU ARE A PASSENGER IN A CAR FOR AN HOUR WITHOUT A BREAK: HIGH CHANCE OF DOZING
HOW LIKELY ARE YOU TO NOD OFF OR FALL ASLEEP WHILE SITTING INACTIVE IN A PUBLIC PLACE: HIGH CHANCE OF DOZING
HOW LIKELY ARE YOU TO NOD OFF OR FALL ASLEEP IN A CAR, WHILE STOPPED FOR A FEW MINUTES IN TRAFFIC: MODERATE CHANCE OF DOZING

## 2025-03-31 ASSESSMENT — PAIN SCALES - GENERAL: PAINLEVEL_OUTOF10: NO PAIN (0)

## 2025-03-31 NOTE — NURSING NOTE
Current patient location: [address] [place] [id number]    Is the patient currently in the state of [place]? YES    Visit mode: VIDEO    If the visit is dropped, the patient can be reconnected by:VIDEO VISIT: Text to cell phone:   Telephone Information:   Mobile [phone]       Will anyone else be joining the visit? NO  (If patient encounters technical issues they should call [phone]    Are changes needed to the allergy or medication list? No    Are refills needed on medications prescribed by this physician? NO    Rooming Documentation:  Questionnaire(s) completed    Reason for visit: Consult    Alyssa FARIA

## 2025-03-31 NOTE — PATIENT INSTRUCTIONS
"          MY TREATMENT INFORMATION FOR SLEEP APNEA-  Tremayne Galarza    Am I having a home sleep study?  --->Watch the video for the device you are using:    -/drop off device-   https://www.[employer].com/watch?v=yGGFBdELGhk  Frequently asked questions:  1. What is Obstructive Sleep Apnea (DANIEL)? DANIEL is the most common type of sleep apnea. Apnea means, \"without breath.\"  Apnea is most often caused by narrowing or collapse of the upper airway as muscles relax during sleep.   Almost everyone has occasional apneas. Most people with sleep apnea have had brief interruptions at night frequently for many years.  The severity of sleep apnea is related to how frequent and severe the events are.   2. What are the consequences of DANIEL? Symptoms include: feeling sleepy during the day, snoring loudly, gasping or stopping of breathing, trouble sleeping, and occasionally morning headaches or heartburn at night.  Sleepiness can be serious and even increase the risk of falling asleep while driving. Other health consequences may include development of high blood pressure and other cardiovascular disease in persons who are susceptible. Untreated DANIEL  can contribute to heart disease, stroke and diabetes.   3. What are the treatment options? In most situations, sleep apnea is a lifelong disease that must be managed with daily therapy. Medications are not effective for sleep apnea and surgery is generally not considered until other therapies have been tried. Your treatment is your choice . Continuous Positive Airway (CPAP) works right away and is the therapy that is effective in nearly everyone. An oral device to hold your jaw forward is usually the next most reliable option. Other options include postioning devices (to keep you off your back), weight loss, and surgery including a tongue pacing device. There is more detail about some of these options below.  4. Are my sleep studies covered by insurance? Although we will request " verification of coverage, we advise you also check in advance of the study to ensure there is coverage.    Important tips for those choosing CPAP and similar devices  REMEMBER-IF YOU RECEIVE A CALL FROM  [phone]-->IT IS TO SETUP A DEVICE  For new devices, sign up for device BRIDGET to monitor your device for your followup visits  We encourage you to utilize the [employer] bridget or website ( [email or web address] ) to monitor your therapy progress and share the data with your healthcare team when you discuss your sleep apnea.                                                    Know your equipment:  CPAP is continuous positive airway pressure that prevents obstructive sleep apnea by keeping the throat from collapsing while you are sleeping. In most cases, the device is  smart  and can slowly self-adjusts if your throat collapses and keeps a record every day of how well you are treated-this information is available to you and your care team.  BPAP is bilevel positive airway pressure that keeps your throat open and also assists each breath with a pressure boost to maintain adequate breathing.  Special kinds of BPAP are used in patients who have inadequate breathing from lung or heart disease. In most cases, the device is  smart  and can slowly self-adjusts to assist breathing. Like CPAP, the device keeps a record of how well you are treated.  Your mask is your connection to the device. You get to choose what feels most comfortable and the staff will help to make sure if fits. Here: are some examples of the different masks that are available: Magnetic mask aids may assist with use but there are safety issues that should be addressed when considering with magnets* ( see end of discussion).       Key points to remember on your journey with sleep apnea:  Sleep study.  PAP devices often need to be adjusted during a sleep study to show that they are effective and adjusted right.  Good tips to remember: Try wearing just  the mask during a quiet time during the day so your body adapts to wearing it. A humidifier is recommended for comfort in most cases to prevent drying of your nose and throat. Allergy medication from your provider may help you if you are having nasal congestion.  Getting settled-in. It takes more than one night for most of us to get used to wearing a mask. Try wearing just the mask during a quiet time during the day so your body adapts to wearing it. A humidifier is recommended for comfort in most cases. Our team will work with you carefully on the first day and will be in contact within 4 days and again at 2 and 4 weeks for advice and remote device adjustments. Your therapy is evaluated by the device each day.   Use it every night. The more you are able to sleep naturally for 7-8 hours, the more likely you will have good sleep and to prevent health risks or symptoms from sleep apnea. Even if you use it 4 hours it helps. Occasionally all of us are unable to use a medical therapy, in sleep apnea, it is not dangerous to miss one night.   Communicate. Call our skilled team on the number provided on the first day if your visit for problems that make it difficult to wear the device. Over 2 out of 3 patients can learn to wear the device long-term with help from our team. Remember to call our team or your sleep providers if you are unable to wear the device as we may have other solutions for those who cannot adapt to mask CPAP therapy. It is recommended that you sleep your sleep provider within the first 3 months and yearly after that if you are not having problems.   Use it for your health. We encourage use of CPAP masks during daytime quiet periods to allow your face and brain to adapt to the sensation of CPAP so that it will be a more natural sensation to awaken to at night or during naps. This can be very useful during the first few weeks or months of adapting to CPAP though it does not help medically to wear CPAP  during wakefulness and  should not be used as a strategy just to meet guidelines.  Take care of your equipment. Make sure you clean your mask and tubing using directions every day and that your filter and mask are replaced as recommended or if they are not working.     *Masks with magnets:  Updated Contraindications  Masks with magnetic components are contraindicated for use by patients where they, or anyone in close physical contact while using the mask, have the following:   Active medical implants that interact with magnets (i.e., pacemakers, implantable cardioverter defibrillators (ICD), neurostimulators, cerebrospinal fluid (CSF) shunts, insulin/infusion pumps)   Metallic implants/objects containing ferromagnetic material (i.e., aneurysm clips/flow disruption devices, embolic coils, stents, valves, electrodes, implants to restore hearing or balance with implanted magnets, ocular implants, metallic splinters in the eye)  Updated Warning  Keep the mask magnets at a safe distance of at least 6 inches (150 mm) away from implants or medical devices that may be adversely affected by magnetic interference. This warning applies to you or anyone in close physical contact with your mask. The magnets are in the frame and lower headgear clips, with a magnetic field strength of up to 400mT. When worn, they connect to secure the mask but may inadvertently detach while asleep.  Implants/medical devices, including those listed within contraindications, may be adversely affected if they change function under external magnetic fields or contain ferromagnetic materials that attract/repel to magnetic fields (some metallic implants, e.g., contact lenses with metal, dental implants, metallic cranial plates, screws, oscar hole covers, and bone substitute devices). Consult your physician and  of your implant / other medical device for information on the potential adverse effects of magnetic fields.    BESIDES CPAP, WHAT  OTHER THERAPIES ARE THERE?    Positioning Device  Positioning devices are generally used when sleep apnea is mild and only occurs on your back.This example shows a pillow that straps around the waist. It may be appropriate for those whose sleep study shows milder sleep apnea that occurs primarily when lying flat on one's back. Preliminary studies have shown benefit but effectiveness at home may need to be verified by a home sleep test. These devices are generally not covered by medical insurance.  Examples of devices that maintain sleeping on the back to prevent snoring and mild sleep apnea.    Belt type body positioner  http://[employer]/    Electronic reminder  http://nightshifttherapy.com/            Oral Appliance  What is oral appliance therapy?  An oral appliance device fits on your teeth at night like a retainer used after having braces. The device is made by a specialized dentist and requires several visits over 1-2 months before a manufactured device is made to fit your teeth and is adjusted to prevent your sleep apnea. Once an oral device is working properly, snoring should be improved. A home sleep test may be recommended at that time if to determine whether the sleep apnea is adequately treated.       Some things to remember:  -Oral devices are often, but not always, covered by your medical insurance. Be sure to check with your insurance provider.   -If you are referred for oral therapy, you will be given a list of specialized dentists to consider or you may choose to visit the Web site of the American Academy of Dental Sleep Medicine  -Oral devices are less likely to work if you have severe sleep apnea or are extremely overweight.     More detailed information  An oral appliance is a small acrylic device that fits over the upper and lower teeth  (similar to a retainer or a mouth guard). This device slightly moves jaw forward, which moves the base of the tongue forward, opens the airway, improves  breathing for effective treat snoring and obstructive sleep apnea in perhaps 7 out of 10 people .  The best working devices are custom-made by a dental device  after a mold is made of the teeth 1, 2, 3.  When is an oral appliance indicated?  Oral appliance therapy is recommended as a first-line treatment for patients with primary snoring, mild sleep apnea, and for patients with moderate sleep apnea who prefer appliance therapy to use of CPAP4, 5. Severity of sleep apnea is determined by sleep testing and is based on the number of respiratory events per hour of sleep.   How successful is oral appliance therapy?  The success rate of oral appliance therapy in patients with mild sleep apnea is 75-80% while in patients with moderate sleep apnea it is 50-70%. The chance of success in patients with severe sleep apnea is 40-50%. The research also shows that oral appliances have a beneficial effect on the cardiovascular health of DANIEL patients at the same magnitude as CPAP therapy7.  Oral appliances should be a second-line treatment in cases of severe sleep apnea, but if not completely successful then a combination therapy utilizing CPAP plus oral appliance therapy may be effective. Oral appliances tend to be effective in a broad range of patients although studies show that the patients who have the highest success are females, younger patients, those with milder disease, and less severe obesity. 3, 6.   Finding a dentist that practices dental sleep medicine  Specific training is available through the American Academy of Dental Sleep Medicine for dentists interested in working in the field of sleep. To find a dentist who is educated in the field of sleep and the use of oral appliances, near you, visit the Web site of the American Academy of Dental Sleep Medicine.    References  1. Villa et al. Objectively measured vs self-reported compliance during oral appliance therapy for sleep-disordered breathing. Chest  2013; 144(5): 8658-7343.  2. Sonya, et al. Objective measurement of compliance during oral appliance therapy for sleep-disordered breathing. Thorax 2013; 68(1): 91-96.  3. Vandana et al. Mandibular advancement devices in 620 men and women with DANIEL and snoring: tolerability and predictors of treatment success. Chest 2004; 125: 9350-8341.  4. Kaity et al. Oral appliances for snoring and DANIEL: a review. Sleep 2006; 29: 244-262.  5. Karma et al. Oral appliance treatment for DANIEL: an update. J Clin Sleep Med 2014; 10(2): 215-227.  6. Argelia et al. Predictors of OSAH treatment outcome. J Dent Res 2007; 86: 3845-6265.      Weight Loss:   Your Body mass index is 31.85 kg/m .    Being overweight does not necessarily mean you will have health consequences.  Those who have BMI over 30 or over 27 with existing medical conditions carries greater risk.   Weight loss decreases severity of sleep apnea in most people with obesity. For those with mild obesity who have developed snoring with weight gain, even 15-30 pound weight loss can improve and occasionally milder eliminate sleep apnea.  Structured and life-long dietary and health habits are necessary to lose weight and keep healthier weight levels.     The Comprehensive Weight loss program offers all aspects of weight loss strategies including two Non-Surgical Weight Loss Programs: Medical Weight Management and our 24 Week Healthy Lifestyle Program:  Medical Weight Management: You will meet with a Medical Weight Management Provider, as well as a Registered Dietician. The program may include medication therapy, dietary education, recommended exercise and physical therapy programs, monthly support group meetings, and possible psychological counseling. Follow up visits with the provider or dietician are scheduled based on your progress and needs.  24 Week Healthy Lifestyle Program: This unique program is designed to give you the support of weekly appointments and  activities thru a 24-week period. It may include all of the components of the basic program (above), with the addition of 11 individual Health  Visits, 24-week access to the [employer] website for over 700 online classes, and monthly support group meetings. This program has an out-of-pocket expense of $499 to cover the items that can not be billed to insurance (health coaches and [employer] access), and is non-refundable/non-transferable (you may be able to use a Health Savings Account; ask your HSA provider). There may be an optional meal replacement plan prescribed as well.   Medication therapy has been approved for the treatment of sleep apnea: The FDA approved tirzepatide for moderate to severe sleep apnea (apnea-hypopnea index greater than or equal to 15) in patients with BMI of greater than or equal to 30, or BMI greater than or equal to 27 with at least 1 weight-related condition such as hypertension or dyslipidemia.  Surgical management achieves meaningful long-term weight loss and improvement in health risks in most patients with more severe obesity.      Sleep Apnea Surgery:    Surgery for obstructive sleep apnea is considered generally only when other therapies fail to work. Surgery may be discussed with you if you are having a difficult time tolerating CPAP and or when there is an abnormal structure that requires surgical correction.  Nose and throat surgeries often enlarge the airway to prevent collapse.  Most of these surgeries create pain for 1-2 weeks and up to half of the most common surgeries are not effective throughout life.  You should carefully discuss the benefits and drawbacks to surgery with your sleep provider and surgeon to determine if it is the best solution for you.   More information  Surgery for DANIEL is directed at areas that are responsible for narrowing or complete obstruction of the airway during sleep.  There are a wide range of procedures available to enlarge and/or stabilize  the airway to prevent blockage of breathing in the three major areas where it can occur: the palate, tongue, and nasal regions.  Successful surgical treatment depends on the accurate identification of the factors responsible for obstructive sleep apnea in each person.  A personalized approach is required because there is no single treatment that works well for everyone.  Because of anatomic variation, consultation with an examination by a sleep surgeon is a critical first step in determining what surgical options are best for each patient.  In some cases, examination during sedation may be recommended in order to guide the selection of procedures.  Patients will be counseled about risks and benefits as well as the typical recovery course after surgery. Surgery is typically not a cure for a person s DANIEL.  However, surgery will often significantly improve one s DANIEL severity (termed  success rate ).  Even in the absence of a cure, surgery will decrease the cardiovascular risk associated with OSA7; improve overall quality of life8 (sleepiness, functionality, sleep quality, etc).      Palate Procedures:  Patients with DANIEL often have narrowing of their airway in the region of their tonsils and uvula.  The goals of palate procedures are to widen the airway in this region as well as to help the tissues resist collapse.  Modern palate procedure techniques focus on tissue conservation and soft tissue rearrangement, rather than tissue removal.  Often the uvula is preserved in this procedure. Residual sleep apnea is common in patient after pharyngoplasty with an average reduction in sleep apnea events of 33%2.      Tongue Procedures:  ExamWhile patients are awake, the muscles that surround the throat are active and keep this region open for breathing. These muscles relax during sleep, allowing the tongue and other structures to collapse and block breathing.  There are several different tongue procedures available.  Selection of  a tongue base procedure depends on characteristics seen on physical exam.  Generally, procedures are aimed at removing bulky tissues in this area or preventing the back of the tongue from falling back during sleep.  Success rates for tongue surgery range from 50-62%3.    Hypoglossal Nerve Stimulation:  Hypoglossal nerve stimulation has recently received approval from the [place] Food and Drug Administration for the treatment of obstructive sleep apnea.  This is based on research showing that the system was safe and effective in treating sleep apnea6.  Results showed that the median AHI score decreased 68%, from 29.3 to 9.0. This therapy uses an implant system that senses breathing patterns and delivers mild stimulation to airway muscles, which keeps the airway open during sleep.  The system consists of three fully implanted components: a small generator (similar in size to a pacemaker), a breathing sensor, and a stimulation lead.  Using a small handheld remote, a patient turns the therapy on before bed and off upon awakening.    Candidates for this device must be greater than 18 years of age, have moderate to severe obstructive sleep apnea with less than 25% central events  (AHI between 15-65), BMI less than 35, have tried CPAP/oral appliance for at least 8 weeks without success, and have appropriate upper airway anatomy (determined by a sleep endoscopy performed by Dr. Ritchie Currie or Dr. Dallas Andres).     Nasal Procedures:  Nasal obstruction can interfere with nasal breathing during the day and night.  Studies have shown that relief of nasal obstruction can improve the ability of some patients to tolerate positive airway pressure therapy for obstructive sleep apnea1.  Treatment options include medications such as nasal saline, topical corticosteroid and antihistamine sprays, and oral medications such as antihistamines or decongestants. Non-surgical treatments can include external nasal dilators for  selected patients. If these are not successful by themselves, surgery can improve the nasal airway either alone or in combination with these other options.        Combination Procedures:  Combination of surgical procedures and other treatments may be recommended, particularly if patients have more than one area of narrowing or persistent positional disease.  The success rate of combination surgery ranges from 66-80%2,3.    References  Danielle CYR. The Role of the Nose in Snoring and Obstructive Sleep Apnoea: An Update.  Eur Arch Otorhinolaryngol. 2011; 268: 1365-73.   Mishel SM; Tiburcio JA; Morgan JR; Pallanch JF; Gwendolyn MB; Demetrice SG; Soham GERARD. Surgical modifications of the upper airway for obstructive sleep apnea in adults: a systematic review and meta-analysis. SLEEP 2010;33(10):5953-7201. Gertrude MANE. Hypopharyngeal surgery in obstructive sleep apnea: an evidence-based medicine review.  Arch Otolaryngol Head Neck Surg. 2006 Feb;132(2):206-13.  Pako YH1, Charlie Y, Andrews ANDREW. The efficacy of anatomically based multilevel surgery for obstructive sleep apnea. Otolaryngol Head Neck Surg. 2003 Oct;129(4):327-35.  Gertrude MANE, Goldberg A. Hypopharyngeal Surgery in Obstructive Sleep Apnea: An Evidence-Based Medicine Review. Arch Otolaryngol Head Neck Surg. 2006 Feb;132(2):206-13.  Lazaro PJ et al. Upper-Airway Stimulation for Obstructive Sleep Apnea.  N Engl J Med. 2014 Jan 9;370(2):139-49.  Anupam Y et al. Increased Incidence of Cardiovascular Disease in Middle-aged Men with Obstructive Sleep Apnea. Am J Respir Crit Care Med; 2002 166: 159-165  Guillermo EM et al. Studying Life Effects and Effectiveness of Palatopharyngoplasty (SLEEP) study: Subjective Outcomes of Isolated Uvulopalatopharyngoplasty. Otolaryngol Head Neck Surg. 2011; 144: 623-631.        WHAT IF I ONLY HAVE SNORING?    Mandibular advancement devices, lateral sleep positioning, long-term weight loss and treatment of nasal allergies have been shown to improve  snoring.  Exercising tongue muscles with a game (https://apps.[employer]/us/bridget/soundly-reduce-snoring/lu3273012052) or stimulating the tongue during the day with a device (https://doi.org/10.3390/wvj65820138) have improved snoring in some individuals.  https://www.[employer]/  https://www.sleepfoundation.org/best-anti-snoring-mouthpieces-and-mouthguards    Remember to Drive Safe... Drive Alive     Sleep health profoundly affects your health, mood, and your safety.  Thirty three percent of the population (one in three of us) is not getting enough sleep and many have a sleep disorder. Not getting enough sleep or having an untreated / undertreated sleep condition may make us sleepy without even knowing it. In fact, our driving could be dramatically impaired due to our sleep health. As your provider, here are some things I would like you to know about driving:     Here are some warning signs for impairment and dangerous drowsy driving:              -Having been awake more than 16 hours               -Looking tired               -Eyelid drooping              -Head nodding (it could be too late at this point)              -Driving for more than 30 minutes     Some things you could do to make the driving safer if you are experiencing some drowsiness:              -Stop driving and rest              -Call for transportation              -Make sure your sleep disorder is adequately treated     Some things that have been shown NOT to work when experiencing drowsiness while driving:              -Turning on the radio              -Opening windows              -Eating any  distracting  /  entertaining  foods (e.g., sunflower seeds, candy, or any other)              -Talking on the phone      Your decision may not only impact your life, but also the life of others. Please, remember to drive safe for yourself and all of us.

## 2025-03-31 NOTE — PROGRESS NOTES
Virtual Visit Details    Type of service:  Video Visit   Video start 2:55 PM  Video end 3:15 PM  Originating Location (pt. Location): Home    Distant Location (provider location):  Off-site  Platform used for Video Visit: [facility]    Outpatient Sleep Medicine Consultation:      Name: Tremayne Galarza MRN# [id number]   Age: 65 year old YOB: 1960     Date of Consultation: March 31, 2025  Consultation is requested by: Daniel Bridges MD  [address] [facility] [address] [facility] [address]  [place] [address] Daniel Bridges  Primary care provider: Valerio Shepard       Reason for Sleep Consult:     Tremayne Galarza is sent by Daniel Bridges for a sleep consultation regarding 1. Hypersomnolence    Patient s Reason for visit  Tremayne Galarza main reason for visit: (Patient-Rptd) Cardiologist recommended  Patient states problem(s) started: (Patient-Rptd) Long ago  Tremayne Galarza's goals for this visit: (Patient-Rptd) Validate if i have a problem           Assessment and Plan:     Summary Sleep Diagnoses:  1. Hypersomnolence (Primary)  2. Witnessed apneic spells  3. Snoring  Comorbid Diagnoses:  4. Class 1 obesity due to excess calories with serious comorbidity and body mass index (BMI) of 31.0 to 31.9 in adult  5. HTN, goal below 140/90  6. Chronic ischemic heart disease  7. Coronary artery disease involving native coronary artery of native heart without angina pectoris    Patient presents to clinic today for evaluation of suspected obstructive sleep apnea (DANIEL), referred by both PCP and cardiology.  He reports loud disruptive snoring, excessive daytime sleepiness (ESS 20), witnessed apneas by wife that all raise concern for DANIEL.  Additional risk factors include male gender, age over 50, comorbid HTN, obesity.  His excessive sleepiness is likely multifactorial as it appears some degree of insufficient sleep is playing a role as well, I also wonder if ESS may be over reported as when I asked him about any sleepiness with driving he  reports he has never had an issue staying awake and alert behind the wheel though he did report 2 on ESS questionnaire.  Regardless, he is more sleepy than he should be during the daytime.  Spent some time today discussing pathophysiology of DANIEL and complications of untreated disease.  He is very open to pursuing testing and starting on treatment if indicated.  Orders ultimately placed for home sleep apnea testing and discussed if test comes back positive as we suspect CPAP therapy will be recommended as gold standard for treatment and he is very open to starting on this.  He would be comfortable with me placing empiric auto CPAP orders to expedite treatment set up.  Also discussed if more mild sleep apnea seen something like mandibular advancement device can be considered.  Agreed I will send him a [employer] message with study results once finalized and place appropriate orders for therapy at that time.  Follow-up with me a couple months after testing to review results and discuss progress on likely CPAP.  Education materials provided in AVS.  - HST-Home Sleep Apnea Test - Noxturnal Returnable; Future         History of Present Illness:     Tremayne Galarza is a 65-year-old male with obesity, CAD, HTN, allergic rhinitis who presents to clinic today after referral from PCP and cardiology for evaluation of suspected sleep apnea given loud snoring and daytime sleepiness.    SLEEP-WAKE SCHEDULE:     Work/School Days: Patient goes to school/work: (Patient-Rptd) Yes   Usually gets into bed at (Patient-Rptd) 1030  Takes patient about (Patient-Rptd) 10 mins to fall asleep  Has trouble falling asleep (Patient-Rptd) Never nights per week  Wakes up in the middle of the night (Patient-Rptd) Periodacly, maybe once for restroom  Wakes up due to (Patient-Rptd) Snorting self awake;Use the bathroom  He has trouble falling back asleep (Patient-Rptd) Seldom times a week.   It usually takes (Patient-Rptd) Not long to get back to  sleep  Patient is usually up at (Patient-Rptd) 430  Uses alarm:  No    Weekends/Non-work Days/All Other Days:  Usually gets into bed at (Patient-Rptd) 1030   Takes patient about (Patient-Rptd) 5 minutes to fall asleep  Patient is usually up at (Patient-Rptd) 600  Uses alarm:  No    Sleep Need  Patient estimates he gets (Patient-Rptd) 5 hours sleep on average   Patient thinks he needs about (Patient-Rptd) 7 hours sleep    Tremayne Galarza prefers to sleep in this position(s): (Patient-Rptd) Side     Naps  Patient takes a purposeful nap (Patient-Rptd) Not often   He feels better after a nap: (Patient-Rptd) Yes  He dozes off unintentionally (Patient-Rptd) Often days per week - fall asleep watching TV nightly before bed, patient also reports he has always found it easy to fall asleep really under any circumstances if he relaxes  Patient has had a driving accident or near-miss due to sleepiness/drowsiness: (Patient-Rptd) No      SLEEP DISRUPTIONS:    Breathing/Snoring  Patient snores:(Patient-Rptd) Yes  Other people complain about his snoring: (Patient-Rptd) Yes  Patient has been told he stops breathing in his sleep:(Patient-Rptd) Yes  Denies morning headache, morning dry mouth, morning nasal congestion, frequent nocturia, nocturnal GERD    Movement:  Patient gets pain, discomfort, with an urge to move:  (Patient-Rptd) No  It happens when he is resting:  (Patient-Rptd) No  It happens more at night:     Patient has been told he kicks his legs at night:  (Patient-Rptd) No     Behaviors in Sleep:  Tremayne Galarza has experienced the following behaviors while sleeping: (Patient-Rptd) Teeth grinding per dentist   Denies sleepwalking, sleep talking, sleep eating, dream enactment, recurring nightmares, night terrors, sleep paralysis, hypnagogic/hypnopompic hallucinations, cataplexy      CAFFEINE AND OTHER SUBSTANCES:    Patient consumes caffeinated beverages per day:  (Patient-Rptd) 3  Last caffeine use is usually: (Patient-Rptd)  Morning  List of any prescribed or over the counter stimulants that patient takes: (Patient-Rptd) None  List of any prescribed or over the counter sleep medication patient takes:  None  List of previous sleep medications that patient has tried:    Patient drinks alcohol to help them sleep: (Patient-Rptd) No  Patient drinks alcohol near bedtime: (Patient-Rptd) Yes    Family History:  Patient has a family member been diagnosed with a sleep disorder: (Patient-Rptd) No      SCALES:    EPWORTH SLEEPINESS SCALE         3/31/2025     2:18 PM    [place] Sleepiness Scale ( STACY Ruiz  [id number]<br>ESS - [place]/English - Final version - 21 Nov 07 - [facility] [employer].)   Sitting and reading High chance of dozing   Watching TV High chance of dozing   Sitting, inactive in a public place (e.g. a theatre or a meeting) High chance of dozing   As a passenger in a car for an hour without a break High chance of dozing   Lying down to rest in the afternoon when circumstances permit Moderate chance of dozing   Sitting and talking to someone Moderate chance of dozing   Sitting quietly after a lunch without alcohol Moderate chance of dozing   In a car, while stopped for a few minutes in traffic Moderate chance of dozing   [place] Score (MC) 20   [place] Score (Sleep) 20        Patient-reported       INSOMNIA SEVERITY INDEX (HANSEL)          3/31/2025     2:08 PM   Insomnia Severity Index (HANSEL)   Difficulty falling asleep 0   Difficulty staying asleep 0   Problems waking up too early 1   How SATISFIED/DISSATISFIED are you with your CURRENT sleep pattern? 1   How NOTICEABLE to others do you think your sleep problem is in terms of impairing the quality of your life? 4   How WORRIED/DISTRESSED are you about your current sleep problem? 1   To what extent do you consider your sleep problem to INTERFERE with your daily functioning (e.g. daytime fatigue, mood, ability to function at work/daily chores, concentration, memory, mood, etc.) CURRENTLY?  1   HANSEL Total Score 8        Patient-reported         Guidelines for Scoring/Interpretation:  Total score categories:  0-7 = No clinically significant insomnia   8-14 = Subthreshold insomnia   15-21 = Clinical insomnia (moderate severity)  22-28 = Clinical insomnia (severe)  Used via courtesy of www.myhealth.va.gov with permission from Melvin Foley PhD., [facility]    Allergies:    No Known Allergies    Medications:    Current Outpatient Medications   Medication Sig Dispense Refill    aspirin (ASPIRIN LOW DOSE) 81 MG EC tablet Take 1 tablet (81 mg) by mouth daily. 30 tablet 3    atenolol (TENORMIN) 25 MG tablet Take 1 tablet (25 mg) by mouth daily. 90 tablet 0    ezetimibe (ZETIA) 10 MG tablet Take 1 tablet (10 mg) by mouth daily 90 tablet 3    guaiFENesin (MUCINEX PO)       rosuvastatin (CRESTOR) 40 MG tablet Take 1 tablet (40 mg) by mouth daily 90 tablet 3    sildenafil (VIAGRA) 25 MG tablet Take 1 tablet (25 mg) by mouth as needed (for ED) 90 tablet 0       Problem List:  Patient Active Problem List    Diagnosis Date Noted    History of nonmelanoma skin cancer 03/03/2020     Priority: Medium    History of dysplastic nevus 03/03/2020     Priority: Medium    Perianal abscess 01/09/2020     Priority: Medium     Added automatically from request for surgery [id number]      Anal fistula 01/09/2020     Priority: Medium     Added automatically from request for surgery [id number]      Verruca plantaris 04/11/2018     Priority: Medium    CAD (coronary artery disease) 08/05/2015     Priority: Medium    Nondependent alcohol abuse 02/03/2015     Priority: Medium    Chronic ischemic heart disease 09/04/2014     Priority: Medium     Overview:   3 stents to RCA 8/2014      Benign neoplasm of colon 06/05/2013     Priority: Medium    Lesion of plantar nerve 05/31/2012     Priority: Medium    Hyperlipidemia with target LDL less than 100 11/14/2005     Priority: Medium     Overview:   LW Onset:  88Mvp41  Diagnosis updated by  automated process. Provider to review and confirm.      HTN, goal below 140/90 02/11/2005     Priority: Medium     Overview:   LW Onset:  11Feb05      Obesity 02/11/2005     Priority: Medium     Overview:   LW Onset:  11Feb05      Allergic rhinitis 02/11/2005     Priority: Medium     Overview:   LW Onset:  11Feb05          Past Medical/Surgical History:  Past Medical History:   Diagnosis Date    Chronic ischemic heart disease 09/04/2014    Overview:  3 stents to RCA 8/2014     Essential hypertension 02/11/2005    Overview:  LW Onset:  11Feb05     Fistula     History of basal cell cancer 2015    Hyperlipidemia 11/14/2005    Overview:  LW Onset:  14Nov05     Perirectal abscess     Staph infection      Past Surgical History:   Procedure Laterality Date    BIOPSY      CARDIAC SURGERY      COLONOSCOPY N/A 12/30/2015    Procedure: COLONOSCOPY;  Surgeon: Duane, William Charles, MD;  Location: [facility] OR    COLONOSCOPY WITH CO2 INSUFFLATION N/A 12/30/2015    Procedure: COLONOSCOPY WITH CO2 INSUFFLATION;  Surgeon: Duane, William Charles, MD;  Location: [facility] OR    EXAM UNDER ANESTHESIA ANUS N/A 1/10/2020    Procedure: EXAM UNDER ANESTHESIA, ANUS, inscision and drainage of perirectal abscess, partial fistulotomy,  seton placement;  Surgeon: Camila Calderon MD;  Location: [facility] OR    EXAM UNDER ANESTHESIA ANUS N/A 6/19/2020    Procedure: Exam Under Anesthesia, Fistulotomy, Seton placement;  Surgeon: Camila Calderon MD;  Location: [facility]    EXAM UNDER ANESTHESIA ANUS N/A 1/15/2021    Procedure: EXAM UNDER ANESTHESIA, ANUS, Completion fistulotomy;  Surgeon: Camila Calderon MD;  [facility]    FISTULOTOMY RECTUM N/A 1/10/2020    Procedure: partial FISTULOTOMY, RECTUM;  Surgeon: Camila Calderon MD;  [facility]    PLACEMENT OF SETON RECTUM N/A 1/10/2020    Procedure: PLACEMENT, SETON STITCH;  Surgeon: Camila Calderon MD;  Location: [facility] OR       Social History:  Social History      Socioeconomic History    Marital status:      Spouse name: Not on file    Number of children: Not on file    Years of education: Not on file    Highest education level: Not on file   Occupational History    Not on file   Tobacco Use    Smoking status: Never     Passive exposure: Never    Smokeless tobacco: Never   Vaping Use    Vaping status: Never Used   Substance and Sexual Activity    Alcohol use: Yes     Alcohol/week: 1.0 standard drink of alcohol     Types: 1 Standard drinks or equivalent per week     Comment: 1    Drug use: No    Sexual activity: Yes     Partners: Female   Other Topics Concern    Parent/sibling w/ CABG, MI or angioplasty before 65F 55M? Not Asked   Social History Narrative    Not on file     Social Drivers of Health     Financial Resource Strain: Low Risk  (2/12/2025)    Financial Resource Strain     Within the past 12 months, have you or your family members you live with been unable to get utilities (heat, electricity) when it was really needed?: No   Food Insecurity: Low Risk  (2/12/2025)    Food Insecurity     Within the past 12 months, did you worry that your food would run out before you got money to buy more?: No     Within the past 12 months, did the food you bought just not last and you didn t have money to get more?: No   Transportation Needs: Low Risk  (2/12/2025)    Transportation Needs     Within the past 12 months, has lack of transportation kept you from medical appointments, getting your medicines, non-medical meetings or appointments, work, or from getting things that you need?: No   Physical Activity: Sufficiently Active (2/12/2025)    Exercise Vital Sign     Days of Exercise per Week: 5 days     Minutes of Exercise per Session: 50 min   Stress: No Stress Concern Present (2/12/2025)    [employer] [place] [employer] - Occupational Stress Questionnaire     Feeling of Stress : Only a little   Social Connections: Unknown (2/12/2025)    Social Connection and  Isolation Panel [NHANES]     Frequency of Communication with Friends and Family: Not on file     Frequency of Social Gatherings with Friends and Family: Once a week     Attends Quaker Services: Not on file     Active Member of Clubs or Organizations: Not on file     Attends Club or Organization Meetings: Not on file     Marital Status: Not on file   Interpersonal Safety: Low Risk  (2025)    Interpersonal Safety     Do you feel physically and emotionally safe where you currently live?: Yes     Within the past 12 months, have you been hit, slapped, kicked or otherwise physically hurt by someone?: No     Within the past 12 months, have you been humiliated or emotionally abused in other ways by your partner or ex-partner?: No   Housing Stability: Low Risk  (2025)    Housing Stability     Do you have housing? : Yes     Are you worried about losing your housing?: No       Family History:  Family History   Problem Relation Age of Onset    Coronary Artery Disease Mother     Hypertension Mother     Cerebrovascular Disease Mother 38        stroke  at age 38    Lung Cancer Father 81       Review of Systems:  In the last TWO WEEKS have you experienced any of the following symptoms?  Fevers: (Patient-Rptd) No  Night Sweats: (Patient-Rptd) Yes  Weight Gain: (Patient-Rptd) No  Pain at Night: (Patient-Rptd) No  Double Vision: (Patient-Rptd) No  Changes in Vision: (Patient-Rptd) No  Difficulty Breathing through Nose: (Patient-Rptd) No  Sore Throat in Morning: (Patient-Rptd) No  Dry Mouth in the Morning: (Patient-Rptd) Yes  Shortness of Breath Lying Flat: (Patient-Rptd) No  Shortness of Breath With Activity: (Patient-Rptd) No  Awakening with Shortness of Breath: (Patient-Rptd) No  Increased Cough: (Patient-Rptd) No  Heart Racing at Night: (Patient-Rptd) No  Swelling in Feet or Legs: (Patient-Rptd) No  Diarrhea at Night: (Patient-Rptd) No  Heartburn at Night: (Patient-Rptd) No  Urinating More than Once at Night:  "(Patient-Rptd) No  Losing Control of Urine at Night: (Patient-Rptd) No  Joint Pains at Night: (Patient-Rptd) Yes  Headaches in Morning: (Patient-Rptd) No  Weakness in Arms or Legs: (Patient-Rptd) No  Depressed Mood: (Patient-Rptd) No  Anxiety: (Patient-Rptd) Yes     Physical Examination:  Vitals: Ht 1.778 m (5' 10\")   Wt 100.7 kg (222 lb)   BMI 31.85 kg/m    BMI= Body mass index is 31.85 kg/m .  General appearance: Awake, alert, cooperative. Well groomed. Sitting comfortably in chair. In no apparent distress.  HEENT: Head: Normocephalic, atraumatic. Eyes:Conjunctiva clear. Sclera normal. Nose: External appearance without deformity.   Pulmonary:  Able to speak easily in full sentences. No cough or wheeze.   Skin:  No rashes or significant lesions on visible skin.   Neurologic: Alert, oriented x3.   Psychiatric: Mood euthymic. Affect congruent with full range and intensity.           Data: All pertinent previous laboratory data reviewed     Recent Labs   Lab Test 05/22/24  0741 01/19/23  0724    142   POTASSIUM 4.8 4.2   CHLORIDE 105 108   CO2 24 28   ANIONGAP 10 6   * 96   BUN 24.2* 14   CR 1.01 0.89   ARMAND 9.9 9.6       Recent Labs   Lab Test 05/22/24  0741   WBC 6.1   RBC 4.54   HGB 14.7   HCT 42.8   MCV 94   MCH 32.4   MCHC 34.3   RDW 12.2          Recent Labs   Lab Test 12/23/19  1544   PROTTOTAL 7.8   ALBUMIN 4.0   BILITOTAL 0.6   ALKPHOS 64   AST 25   ALT 36       TSH   Date Value   05/22/2024 2.30 uIU/mL   01/19/2023 1.98 mU/L   02/06/2015 1.78 mU/L       No results found for: \"UAMP\", \"UBARB\", \"BENZODIAZEUR\", \"UCANN\", \"UCOC\", \"OPIT\", \"UPCP\"    No results found for: \"IRONSAT\", \"BX42490\", \"VERONICA\"    No results found for: \"PH\", \"PHARTERIAL\", \"PO2\", \"PV4OLPFGSJG\", \"SAT\", \"PCO2\", \"HCO3\", \"BASEEXCESS\", \"VERONICA\", \"BEB\"    @LABRCNTIPR(phv:4,pco2v:4,po2v:4,hco3v:4,jose luis:4,o2per:4)@    Echocardiology: No results found for this or any previous visit (from the past 4320 hours).    Chest x-ray: No results " "found for this or any previous visit from the past 365 days.      Chest CT: No results found for this or any previous visit from the past 365 days.      PFT: Most Recent [facility]ze Pulmonary Function Testing    No results found for: \"20001\"  No results found for: \"20002\"  No results found for: \"20003\"  No results found for: \"20015\"  No results found for: \"20016\"  No results found for: \"20027\"  No results found for: \"20028\"  No results found for: \"20029\"  No results found for: \"20079\"  No results found for: \"20080\"  No results found for: \"20081\"  No results found for: \"20335\"  No results found for: \"20105\"  No results found for: \"20053\"  No results found for: \"20054\"  No results found for: \"20055\"      Almaz Jacobsen PA-C 3/31/2025     [facility]  [address] [address]     [facility]  [address] [address]    Chart documentation was completed, in part, with [employer] voice-recognition software. Even though reviewed, some grammatical, spelling, and word errors may remain.    45 minutes spent on day of encounter reviewing medical records, history and physical examination, review of previous testing and interpretation, documentation and further activities as noted above        "

## 2025-04-15 ENCOUNTER — OFFICE VISIT (OUTPATIENT)
Dept: FAMILY MEDICINE | Facility: CLINIC | Age: 65
End: 2025-04-15
Payer: COMMERCIAL

## 2025-04-15 VITALS
HEIGHT: 71 IN | DIASTOLIC BLOOD PRESSURE: 76 MMHG | SYSTOLIC BLOOD PRESSURE: 119 MMHG | HEART RATE: 58 BPM | BODY MASS INDEX: 31.12 KG/M2 | OXYGEN SATURATION: 99 % | WEIGHT: 222.31 LBS | TEMPERATURE: 97.6 F | RESPIRATION RATE: 18 BRPM

## 2025-04-15 DIAGNOSIS — I25.10 CORONARY ARTERY DISEASE INVOLVING NATIVE CORONARY ARTERY OF NATIVE HEART WITHOUT ANGINA PECTORIS: ICD-10-CM

## 2025-04-15 DIAGNOSIS — I10 HTN, GOAL BELOW 140/90: ICD-10-CM

## 2025-04-15 DIAGNOSIS — Z23 ENCOUNTER FOR IMMUNIZATION: Primary | ICD-10-CM

## 2025-04-15 PROCEDURE — 90678 RSV VACC PREF BIVALENT IM: CPT | Performed by: INTERNAL MEDICINE

## 2025-04-15 PROCEDURE — 90750 HZV VACC RECOMBINANT IM: CPT | Performed by: INTERNAL MEDICINE

## 2025-04-15 PROCEDURE — 99213 OFFICE O/P EST LOW 20 MIN: CPT | Mod: 25 | Performed by: INTERNAL MEDICINE

## 2025-04-15 PROCEDURE — 3078F DIAST BP <80 MM HG: CPT | Performed by: INTERNAL MEDICINE

## 2025-04-15 PROCEDURE — 1126F AMNT PAIN NOTED NONE PRSNT: CPT | Performed by: INTERNAL MEDICINE

## 2025-04-15 PROCEDURE — 3074F SYST BP LT 130 MM HG: CPT | Performed by: INTERNAL MEDICINE

## 2025-04-15 PROCEDURE — 90471 IMMUNIZATION ADMIN: CPT | Performed by: INTERNAL MEDICINE

## 2025-04-15 PROCEDURE — 90472 IMMUNIZATION ADMIN EACH ADD: CPT | Performed by: INTERNAL MEDICINE

## 2025-04-15 ASSESSMENT — PAIN SCALES - GENERAL: PAINLEVEL_OUTOF10: NO PAIN (0)

## 2025-04-15 NOTE — PROGRESS NOTES
Prior to immunization administration, verified patients identity using patient s name and date of birth. Please see Immunization Activity for additional information.     Screening Questionnaire for Adult Immunization    Are you sick today?   No   Do you have allergies to medications, food, a vaccine component or latex?   No   Have you ever had a serious reaction after receiving a vaccination?   No   Do you have a long-term health problem with heart, lung, kidney, or metabolic disease (e.g., diabetes), asthma, a blood disorder, no spleen, complement component deficiency, a cochlear implant, or a spinal fluid leak?  Are you on long-term aspirin therapy?   No   Do you have cancer, leukemia, HIV/AIDS, or any other immune system problem?   No   Do you have a parent, brother, or sister with an immune system problem?   No   In the past 3 months, have you taken medications that affect  your immune system, such as prednisone, other steroids, or anticancer drugs; drugs for the treatment of rheumatoid arthritis, Crohn s disease, or psoriasis; or have you had radiation treatments?   No   Have you had a seizure, or a brain or other nervous system problem?   No   During the past year, have you received a transfusion of blood or blood    products, or been given immune (gamma) globulin or antiviral drug?   No   For women: Are you pregnant or is there a chance you could become       pregnant during the next month?   No   Have you received any vaccinations in the past 4 weeks?   No     Immunization questionnaire answers were all negative.      Patient instructed to remain in clinic for 15 minutes afterwards, and to report any adverse reactions.     Screening performed by Rosario Fonseca MA on 4/15/2025 at 7:24 AM.

## 2025-04-15 NOTE — PROGRESS NOTES
"  Assessment & Plan     1.  Encounter for immunization.  RSV.  Second dose of Shingrix vaccine provided today.  2.  Essential hypertension under excellent control.  Blood pressure normal today continue current medication.  3.  Coronary artery disease.  Stable.  No angina.  Patient exercising 5 days a week.    Return for annual checkup middle of February 2026.  Patient indicated he will set up the appointment himself.      Isatu Casper is a 65 year old, presenting for the following health issues:  Imm/Inj (Shingles & RVS )    History of Present Illness       Reason for visit:  Shingles shot He is missing 2 dose(s) of medications per week.      The patient made this appointment basically for his vaccination.  He is overall feeling good.  He has no chest pain or shortness of breath.  Takes his medication as prescribed.  Is also been taking baby aspirin every day.  Does kickboxing 45 minutes 5 days a week.  Is getting good cardiovascular workout.    Review of Systems  Constitutional, HEENT, cardiovascular, pulmonary, GI, , musculoskeletal, neuro, skin, endocrine and psych systems are negative, except as otherwise noted.      Objective    /76 (BP Location: Right arm, Patient Position: Sitting, Cuff Size: Adult Large)   Pulse 58   Temp 97.6  F (36.4  C) (Oral)   Resp 18   Ht 1.791 m (5' 10.5\")   Wt 100.8 kg (222 lb 5 oz)   SpO2 99%   BMI 31.45 kg/m    Body mass index is 31.45 kg/m .  Physical Exam   GENERAL: alert and no distress  RESP: lungs clear to auscultation - no rales, rhonchi or wheezes  CV: regular rate and rhythm, normal S1 S2, no S3 or S4, no murmur, click or rub, no peripheral edema            Signed Electronically by: Valerio Shepard MD    "

## 2025-05-19 DIAGNOSIS — E78.00 HYPERCHOLESTEREMIA: ICD-10-CM

## 2025-05-22 RX ORDER — ROSUVASTATIN CALCIUM 40 MG/1
40 TABLET, COATED ORAL DAILY
Qty: 90 TABLET | Refills: 3 | Status: SHIPPED | OUTPATIENT
Start: 2025-05-22

## 2025-05-22 NOTE — TELEPHONE ENCOUNTER
Last Written Prescription:  5/29/24  90 : 3  ----------------------  Last Visit Date: 12/18/24  Future Visit Date: 12/10/25    Refill decision: Medication unable to be refilled by RN due to: Medication not included in refill protocol policy     Request from pharmacy:  Requested Prescriptions   Pending Prescriptions Disp Refills    rosuvastatin (CRESTOR) 40 MG tablet [Pharmacy Med Name: ROSUVASTATIN 40MG TABS] 90 tablet 3     Sig: TAKE ONE TABLET BY MOUTH ONCE DAILY       Antihyperlipidemic agents Passed - 5/21/2025 10:00 PM        Passed - LDL on file in the past 12 months        Passed - Medication is active on med list and the sig matches. RN to manually verify dose and sig if red X/fail.             Passed - Recent (12 month) or future (90 days) visit with authorizing provider's specialty (provided they have been seen in the past 15 months)     The patient must have completed an in-person or virtual visit within the past 12 months or has a future visit scheduled within the next 90 days with the authorizing provider s specialty.  Urgent care and e-visits do not qualify as an office visit for this protocol.          Passed - Patient is age 18 years or older

## 2025-07-18 ENCOUNTER — TELEPHONE (OUTPATIENT)
Dept: SLEEP MEDICINE | Facility: CLINIC | Age: 65
End: 2025-07-18
Payer: COMMERCIAL

## 2025-07-18 NOTE — TELEPHONE ENCOUNTER
Order/Referral Request    Who is requesting: patient    Orders being requested: HST referral    Reason service is needed/diagnosis: sleep apnea    When are orders needed by: as soon as possible.  The clinic cancelled his original  stating it was a scheduling error.  Patient tried to reschedule but the referral expires in September and we are scheduling further out.  Please extend the date on the original order or put in a new order for HST.     Has this been discussed with Provider: Yes    Does patient have a preference on a Group/Provider/Facility? [facility]    Does patient have an appointment scheduled?: No    Where to send orders: Place orders within Epic    [facility] or would you prefer to receive a phone call?:   Patient would prefer a phone call   Okay to leave a detailed message?: Yes at Cell number on file:    Telephone Information:   Mobile [phone]

## 2025-08-01 ENCOUNTER — MYC MEDICAL ADVICE (OUTPATIENT)
Dept: SLEEP MEDICINE | Facility: CLINIC | Age: 65
End: 2025-08-01
Payer: COMMERCIAL

## 2025-08-06 ASSESSMENT — SLEEP AND FATIGUE QUESTIONNAIRES
HOW LIKELY ARE YOU TO NOD OFF OR FALL ASLEEP WHILE SITTING INACTIVE IN A PUBLIC PLACE: MODERATE CHANCE OF DOZING
HOW LIKELY ARE YOU TO NOD OFF OR FALL ASLEEP WHILE LYING DOWN TO REST IN THE AFTERNOON WHEN CIRCUMSTANCES PERMIT: MODERATE CHANCE OF DOZING
HOW LIKELY ARE YOU TO NOD OFF OR FALL ASLEEP WHILE SITTING AND TALKING TO SOMEONE: MODERATE CHANCE OF DOZING
HOW LIKELY ARE YOU TO NOD OFF OR FALL ASLEEP WHILE WATCHING TV: HIGH CHANCE OF DOZING
HOW LIKELY ARE YOU TO NOD OFF OR FALL ASLEEP WHILE SITTING AND READING: HIGH CHANCE OF DOZING
HOW LIKELY ARE YOU TO NOD OFF OR FALL ASLEEP WHILE SITTING QUIETLY AFTER LUNCH WITHOUT ALCOHOL: MODERATE CHANCE OF DOZING
HOW LIKELY ARE YOU TO NOD OFF OR FALL ASLEEP WHEN YOU ARE A PASSENGER IN A CAR FOR AN HOUR WITHOUT A BREAK: HIGH CHANCE OF DOZING
HOW LIKELY ARE YOU TO NOD OFF OR FALL ASLEEP IN A CAR, WHILE STOPPED FOR A FEW MINUTES IN TRAFFIC: SLIGHT CHANCE OF DOZING

## 2025-08-07 ENCOUNTER — OFFICE VISIT (OUTPATIENT)
Dept: SLEEP MEDICINE | Facility: CLINIC | Age: 65
End: 2025-08-07
Payer: COMMERCIAL

## 2025-08-07 DIAGNOSIS — I25.9 CHRONIC ISCHEMIC HEART DISEASE: ICD-10-CM

## 2025-08-07 DIAGNOSIS — I10 HTN, GOAL BELOW 140/90: ICD-10-CM

## 2025-08-07 DIAGNOSIS — R06.81 WITNESSED APNEIC SPELLS: ICD-10-CM

## 2025-08-07 DIAGNOSIS — I25.10 CORONARY ARTERY DISEASE INVOLVING NATIVE CORONARY ARTERY OF NATIVE HEART WITHOUT ANGINA PECTORIS: ICD-10-CM

## 2025-08-07 DIAGNOSIS — E66.09 CLASS 1 OBESITY DUE TO EXCESS CALORIES WITH SERIOUS COMORBIDITY AND BODY MASS INDEX (BMI) OF 31.0 TO 31.9 IN ADULT: ICD-10-CM

## 2025-08-07 DIAGNOSIS — G47.10 HYPERSOMNOLENCE: ICD-10-CM

## 2025-08-07 DIAGNOSIS — E66.811 CLASS 1 OBESITY DUE TO EXCESS CALORIES WITH SERIOUS COMORBIDITY AND BODY MASS INDEX (BMI) OF 31.0 TO 31.9 IN ADULT: ICD-10-CM

## 2025-08-07 DIAGNOSIS — R06.83 SNORING: ICD-10-CM

## 2025-08-08 ENCOUNTER — DOCUMENTATION ONLY (OUTPATIENT)
Dept: SLEEP MEDICINE | Facility: CLINIC | Age: 65
End: 2025-08-08
Payer: COMMERCIAL

## 2025-08-14 ENCOUNTER — MYC MEDICAL ADVICE (OUTPATIENT)
Dept: SLEEP MEDICINE | Facility: CLINIC | Age: 65
End: 2025-08-14
Payer: COMMERCIAL

## 2025-08-14 ENCOUNTER — TELEPHONE (OUTPATIENT)
Dept: SLEEP MEDICINE | Facility: CLINIC | Age: 65
End: 2025-08-14
Payer: COMMERCIAL

## 2025-08-14 DIAGNOSIS — G47.33 OSA (OBSTRUCTIVE SLEEP APNEA): Primary | ICD-10-CM

## 2025-08-26 ENCOUNTER — DOCUMENTATION ONLY (OUTPATIENT)
Dept: SLEEP MEDICINE | Facility: CLINIC | Age: 65
End: 2025-08-26
Payer: COMMERCIAL

## 2025-08-26 DIAGNOSIS — G47.33 OBSTRUCTIVE SLEEP APNEA (ADULT) (PEDIATRIC): Primary | ICD-10-CM

## (undated) DEVICE — STRAP KNEE/BODY 31143004

## (undated) DEVICE — PANTIES MESH LG/XLG 2PK 706M2

## (undated) DEVICE — LINEN TOWEL PACK X5 5464

## (undated) DEVICE — TAPE DURAPORE 3" SILK 1538-3

## (undated) DEVICE — SU SILK 2-0 TIE 12X30" A305H

## (undated) DEVICE — GLOVE PROTEXIS W/NEU-THERA 6.5  2D73TE65

## (undated) DEVICE — PACK RECTAL KIT CUSTOM ASC

## (undated) DEVICE — PAD CHUX UNDERPAD 30X30"

## (undated) DEVICE — ESU ELEC NDL 1" E1552

## (undated) DEVICE — NDL ANGIOCATH 14GA 1.25" 4048

## (undated) DEVICE — NDL BLUNT 18GA 1.5" FILTER 305211

## (undated) DEVICE — SOL WATER IRRIG 1000ML BOTTLE 2F7114

## (undated) DEVICE — DRSG GAUZE 4X4" TRAY 6939

## (undated) DEVICE — VESSEL LOOPS YELLOW MAXI 31145694

## (undated) RX ORDER — ACETIC ACID 5 %
LIQUID (ML) MISCELLANEOUS
Status: DISPENSED
Start: 2020-01-10

## (undated) RX ORDER — LIDOCAINE HYDROCHLORIDE AND EPINEPHRINE 10; 10 MG/ML; UG/ML
INJECTION, SOLUTION INFILTRATION; PERINEURAL
Status: DISPENSED
Start: 2020-04-13

## (undated) RX ORDER — PROPOFOL 10 MG/ML
INJECTION, EMULSION INTRAVENOUS
Status: DISPENSED
Start: 2021-01-15

## (undated) RX ORDER — KETOROLAC TROMETHAMINE 30 MG/ML
INJECTION, SOLUTION INTRAMUSCULAR; INTRAVENOUS
Status: DISPENSED
Start: 2021-01-15

## (undated) RX ORDER — ACETAMINOPHEN 325 MG/1
TABLET ORAL
Status: DISPENSED
Start: 2020-01-10

## (undated) RX ORDER — GLYCOPYRROLATE 0.2 MG/ML
INJECTION INTRAMUSCULAR; INTRAVENOUS
Status: DISPENSED
Start: 2021-01-15

## (undated) RX ORDER — BUPIVACAINE HYDROCHLORIDE 5 MG/ML
INJECTION, SOLUTION PERINEURAL
Status: DISPENSED
Start: 2020-06-19

## (undated) RX ORDER — BUPIVACAINE HYDROCHLORIDE 2.5 MG/ML
INJECTION, SOLUTION EPIDURAL; INFILTRATION; INTRACAUDAL
Status: DISPENSED
Start: 2021-01-15

## (undated) RX ORDER — LIDOCAINE HYDROCHLORIDE 20 MG/ML
INJECTION, SOLUTION EPIDURAL; INFILTRATION; INTRACAUDAL; PERINEURAL
Status: DISPENSED
Start: 2020-06-19

## (undated) RX ORDER — GABAPENTIN 300 MG/1
CAPSULE ORAL
Status: DISPENSED
Start: 2020-06-19

## (undated) RX ORDER — EPINEPHRINE 1 MG/ML
INJECTION, SOLUTION, CONCENTRATE INTRAVENOUS
Status: DISPENSED
Start: 2020-01-10

## (undated) RX ORDER — KETOROLAC TROMETHAMINE 30 MG/ML
INJECTION, SOLUTION INTRAMUSCULAR; INTRAVENOUS
Status: DISPENSED
Start: 2020-06-19

## (undated) RX ORDER — GLYCOPYRROLATE 0.2 MG/ML
INJECTION INTRAMUSCULAR; INTRAVENOUS
Status: DISPENSED
Start: 2020-06-19

## (undated) RX ORDER — BUPIVACAINE HYDROCHLORIDE 2.5 MG/ML
INJECTION, SOLUTION EPIDURAL; INFILTRATION; INTRACAUDAL
Status: DISPENSED
Start: 2020-01-10

## (undated) RX ORDER — ONDANSETRON 2 MG/ML
INJECTION INTRAMUSCULAR; INTRAVENOUS
Status: DISPENSED
Start: 2020-06-19

## (undated) RX ORDER — LIDOCAINE HYDROCHLORIDE 20 MG/ML
INJECTION, SOLUTION EPIDURAL; INFILTRATION; INTRACAUDAL; PERINEURAL
Status: DISPENSED
Start: 2021-01-15

## (undated) RX ORDER — LIDOCAINE HYDROCHLORIDE AND EPINEPHRINE 10; 10 MG/ML; UG/ML
INJECTION, SOLUTION INFILTRATION; PERINEURAL
Status: DISPENSED
Start: 2020-01-27

## (undated) RX ORDER — EPINEPHRINE 1 MG/ML
INJECTION, SOLUTION INTRAMUSCULAR; SUBCUTANEOUS
Status: DISPENSED
Start: 2021-01-15

## (undated) RX ORDER — ONDANSETRON 2 MG/ML
INJECTION INTRAMUSCULAR; INTRAVENOUS
Status: DISPENSED
Start: 2021-01-15

## (undated) RX ORDER — LIDOCAINE HYDROCHLORIDE AND EPINEPHRINE 10; 10 MG/ML; UG/ML
INJECTION, SOLUTION INFILTRATION; PERINEURAL
Status: DISPENSED
Start: 2020-06-19

## (undated) RX ORDER — PROPOFOL 10 MG/ML
INJECTION, EMULSION INTRAVENOUS
Status: DISPENSED
Start: 2020-06-19

## (undated) RX ORDER — LIDOCAINE HYDROCHLORIDE AND EPINEPHRINE 10; 10 MG/ML; UG/ML
INJECTION, SOLUTION INFILTRATION; PERINEURAL
Status: DISPENSED
Start: 2020-01-08

## (undated) RX ORDER — LIDOCAINE HYDROCHLORIDE 10 MG/ML
INJECTION, SOLUTION INFILTRATION; PERINEURAL
Status: DISPENSED
Start: 2019-12-26

## (undated) RX ORDER — KETAMINE HCL IN 0.9 % NACL 50 MG/5 ML
SYRINGE (ML) INTRAVENOUS
Status: DISPENSED
Start: 2020-01-10

## (undated) RX ORDER — LIDOCAINE HYDROCHLORIDE 10 MG/ML
INJECTION, SOLUTION EPIDURAL; INFILTRATION; INTRACAUDAL; PERINEURAL
Status: DISPENSED
Start: 2021-01-15

## (undated) RX ORDER — LIDOCAINE HYDROCHLORIDE 10 MG/ML
INJECTION, SOLUTION EPIDURAL; INFILTRATION; INTRACAUDAL; PERINEURAL
Status: DISPENSED
Start: 2020-01-10

## (undated) RX ORDER — ACETAMINOPHEN 325 MG/1
TABLET ORAL
Status: DISPENSED
Start: 2020-06-19

## (undated) RX ORDER — BUPIVACAINE HYDROCHLORIDE 5 MG/ML
INJECTION, SOLUTION EPIDURAL; INTRACAUDAL
Status: DISPENSED
Start: 2021-01-15

## (undated) RX ORDER — ACETAMINOPHEN 325 MG/1
TABLET ORAL
Status: DISPENSED
Start: 2021-01-15